# Patient Record
Sex: FEMALE | Race: WHITE | NOT HISPANIC OR LATINO | Employment: OTHER | URBAN - METROPOLITAN AREA
[De-identification: names, ages, dates, MRNs, and addresses within clinical notes are randomized per-mention and may not be internally consistent; named-entity substitution may affect disease eponyms.]

---

## 2017-02-17 ENCOUNTER — APPOINTMENT (EMERGENCY)
Dept: NUCLEAR MEDICINE | Facility: HOSPITAL | Age: 77
DRG: 280 | End: 2017-02-17
Payer: MEDICARE

## 2017-02-17 ENCOUNTER — HOSPITAL ENCOUNTER (INPATIENT)
Facility: HOSPITAL | Age: 77
LOS: 4 days | Discharge: HOME | DRG: 280 | End: 2017-02-21
Attending: EMERGENCY MEDICINE | Admitting: HOSPITALIST
Payer: MEDICARE

## 2017-02-17 ENCOUNTER — APPOINTMENT (EMERGENCY)
Dept: ULTRASOUND IMAGING | Facility: HOSPITAL | Age: 77
DRG: 280 | End: 2017-02-17
Payer: MEDICARE

## 2017-02-17 ENCOUNTER — GENERIC CONVERSION - ENCOUNTER (OUTPATIENT)
Dept: OTHER | Facility: OTHER | Age: 77
End: 2017-02-17

## 2017-02-17 ENCOUNTER — APPOINTMENT (EMERGENCY)
Dept: RADIOLOGY | Facility: HOSPITAL | Age: 77
DRG: 280 | End: 2017-02-17
Payer: MEDICARE

## 2017-02-17 DIAGNOSIS — I82.409 DVT (DEEP VENOUS THROMBOSIS) (HCC): ICD-10-CM

## 2017-02-17 DIAGNOSIS — I50.23 ACUTE ON CHRONIC SYSTOLIC CONGESTIVE HEART FAILURE (HCC): Chronic | ICD-10-CM

## 2017-02-17 DIAGNOSIS — I48.91 ATRIAL FIBRILLATION WITH RAPID VENTRICULAR RESPONSE (HCC): ICD-10-CM

## 2017-02-17 DIAGNOSIS — I26.99 ACUTE PULMONARY EMBOLISM (HCC): Primary | ICD-10-CM

## 2017-02-17 DIAGNOSIS — I82.409 ACUTE DVT (DEEP VENOUS THROMBOSIS) (HCC): ICD-10-CM

## 2017-02-17 DIAGNOSIS — I48.91 RAPID ATRIAL FIBRILLATION (HCC): ICD-10-CM

## 2017-02-17 PROBLEM — I45.10 INCOMPLETE RBBB: Status: ACTIVE | Noted: 2017-02-17

## 2017-02-17 PROBLEM — R53.83 FATIGUE: Status: ACTIVE | Noted: 2017-02-17

## 2017-02-17 PROBLEM — I21.4 NSTEMI (NON-ST ELEVATED MYOCARDIAL INFARCTION) (HCC): Status: ACTIVE | Noted: 2017-02-17

## 2017-02-17 PROBLEM — K21.9 GERD (GASTROESOPHAGEAL REFLUX DISEASE): Chronic | Status: ACTIVE | Noted: 2017-02-17

## 2017-02-17 PROBLEM — K21.9 GERD (GASTROESOPHAGEAL REFLUX DISEASE): Status: ACTIVE | Noted: 2017-02-17

## 2017-02-17 PROBLEM — R73.9 HYPERGLYCEMIA: Status: ACTIVE | Noted: 2017-02-17

## 2017-02-17 PROBLEM — N17.9 AKI (ACUTE KIDNEY INJURY) (HCC): Status: ACTIVE | Noted: 2017-02-17

## 2017-02-17 PROBLEM — R06.89 RESPIRATORY INSUFFICIENCY: Status: ACTIVE | Noted: 2017-02-17

## 2017-02-17 PROBLEM — D72.829 LEUKOCYTOSIS: Status: ACTIVE | Noted: 2017-02-17

## 2017-02-17 LAB
ALBUMIN SERPL BCP-MCNC: 2.7 G/DL (ref 3.5–5)
ALP SERPL-CCNC: 111 U/L (ref 46–116)
ALT SERPL W P-5'-P-CCNC: 41 U/L (ref 12–78)
ANION GAP SERPL CALCULATED.3IONS-SCNC: 10 MMOL/L (ref 4–13)
ANISOCYTOSIS BLD QL SMEAR: PRESENT
APTT PPP: 27 SECONDS (ref 24–36)
AST SERPL W P-5'-P-CCNC: 47 U/L (ref 5–45)
BACTERIA UR QL AUTO: ABNORMAL /HPF
BASOPHILS # BLD MANUAL: 0 THOUSAND/UL (ref 0–0.1)
BASOPHILS NFR MAR MANUAL: 0 % (ref 0–1)
BILIRUB SERPL-MCNC: 1.4 MG/DL (ref 0.2–1)
BILIRUB UR QL STRIP: ABNORMAL
BUN SERPL-MCNC: 20 MG/DL (ref 5–25)
CALCIUM SERPL-MCNC: 9.6 MG/DL (ref 8.3–10.1)
CHLORIDE SERPL-SCNC: 101 MMOL/L (ref 100–108)
CLARITY UR: ABNORMAL
CO2 SERPL-SCNC: 26 MMOL/L (ref 21–32)
COLOR UR: YELLOW
CREAT SERPL-MCNC: 1.47 MG/DL (ref 0.6–1.3)
DEPRECATED D DIMER PPP: ABNORMAL NG/ML (FEU) (ref 0–424)
EOSINOPHIL # BLD MANUAL: 0.38 THOUSAND/UL (ref 0–0.4)
EOSINOPHIL NFR BLD MANUAL: 0 % (ref 0–6)
ERYTHROCYTE [DISTWIDTH] IN BLOOD BY AUTOMATED COUNT: 15.6 % (ref 11.6–15.1)
EST. AVERAGE GLUCOSE BLD GHB EST-MCNC: 174 MG/DL
GFR SERPL CREATININE-BSD FRML MDRD: 34.7 ML/MIN/1.73SQ M
GLUCOSE SERPL-MCNC: 145 MG/DL (ref 65–140)
GLUCOSE SERPL-MCNC: 171 MG/DL (ref 65–140)
GLUCOSE SERPL-MCNC: 227 MG/DL (ref 65–140)
GLUCOSE UR STRIP-MCNC: NEGATIVE MG/DL
HBA1C MFR BLD: 7.7 % (ref 4.2–6.3)
HCT VFR BLD AUTO: 49.7 % (ref 34.8–46.1)
HGB BLD-MCNC: 16.3 G/DL (ref 11.5–15.4)
HGB UR QL STRIP.AUTO: ABNORMAL
HYALINE CASTS #/AREA URNS LPF: ABNORMAL /LPF
INR PPP: 1.18 (ref 0.86–1.16)
KETONES UR STRIP-MCNC: ABNORMAL MG/DL
LEUKOCYTE ESTERASE UR QL STRIP: ABNORMAL
LYMPHOCYTES # BLD AUTO: 1.5 THOUSAND/UL (ref 0.6–4.47)
LYMPHOCYTES # BLD AUTO: 10 % (ref 14–44)
MCH RBC QN AUTO: 30.3 PG (ref 26.8–34.3)
MCHC RBC AUTO-ENTMCNC: 32.8 G/DL (ref 31.4–37.4)
MCV RBC AUTO: 92 FL (ref 82–98)
MONOCYTES # BLD AUTO: 0.5 THOUSAND/UL (ref 0–1.22)
MONOCYTES NFR BLD: 15 % (ref 4–12)
NEUTROPHILS # BLD MANUAL: 10.14 THOUSAND/UL (ref 1.85–7.62)
NEUTS SEG NFR BLD AUTO: 75 % (ref 43–75)
NITRITE UR QL STRIP: NEGATIVE
NON-SQ EPI CELLS URNS QL MICRO: ABNORMAL /HPF
NT-PROBNP SERPL-MCNC: ABNORMAL PG/ML
NT-PROBNP SERPL-MCNC: ABNORMAL PG/ML
OTHER STN SPEC: ABNORMAL
PH UR STRIP.AUTO: 5.5 [PH] (ref 4.5–8)
PLATELET # BLD AUTO: 179 THOUSANDS/UL (ref 149–390)
PLATELET BLD QL SMEAR: ADEQUATE
PMV BLD AUTO: 11.4 FL (ref 8.9–12.7)
POTASSIUM SERPL-SCNC: 4.9 MMOL/L (ref 3.5–5.3)
PROT SERPL-MCNC: 7 G/DL (ref 6.4–8.2)
PROT UR STRIP-MCNC: ABNORMAL MG/DL
PROTHROMBIN TIME: 14.7 SECONDS (ref 12–14.3)
RBC # BLD AUTO: 5.38 MILLION/UL (ref 3.81–5.12)
RBC #/AREA URNS AUTO: ABNORMAL /HPF
SODIUM SERPL-SCNC: 137 MMOL/L (ref 136–145)
SP GR UR STRIP.AUTO: >=1.03 (ref 1–1.03)
TOTAL CELLS COUNTED SPEC: 100
TROPONIN I SERPL-MCNC: 0.5 NG/ML
TROPONIN I SERPL-MCNC: 0.57 NG/ML
TSH SERPL DL<=0.05 MIU/L-ACNC: 0.75 UIU/ML (ref 0.36–3.74)
UROBILINOGEN UR QL STRIP.AUTO: 1 E.U./DL
WBC # BLD AUTO: 12.52 THOUSAND/UL (ref 4.31–10.16)
WBC #/AREA URNS AUTO: ABNORMAL /HPF

## 2017-02-17 PROCEDURE — 93005 ELECTROCARDIOGRAM TRACING: CPT | Performed by: EMERGENCY MEDICINE

## 2017-02-17 PROCEDURE — 85007 BL SMEAR W/DIFF WBC COUNT: CPT | Performed by: EMERGENCY MEDICINE

## 2017-02-17 PROCEDURE — 96365 THER/PROPH/DIAG IV INF INIT: CPT

## 2017-02-17 PROCEDURE — 83880 ASSAY OF NATRIURETIC PEPTIDE: CPT | Performed by: NURSE PRACTITIONER

## 2017-02-17 PROCEDURE — A9558 XE133 XENON 10MCI: HCPCS

## 2017-02-17 PROCEDURE — 36415 COLL VENOUS BLD VENIPUNCTURE: CPT | Performed by: EMERGENCY MEDICINE

## 2017-02-17 PROCEDURE — 94664 DEMO&/EVAL PT USE INHALER: CPT

## 2017-02-17 PROCEDURE — A9540 TC99M MAA: HCPCS

## 2017-02-17 PROCEDURE — 71020 HB CHEST X-RAY 2VW FRONTAL&LATL: CPT

## 2017-02-17 PROCEDURE — 85379 FIBRIN DEGRADATION QUANT: CPT | Performed by: EMERGENCY MEDICINE

## 2017-02-17 PROCEDURE — 93005 ELECTROCARDIOGRAM TRACING: CPT

## 2017-02-17 PROCEDURE — 84443 ASSAY THYROID STIM HORMONE: CPT | Performed by: NURSE PRACTITIONER

## 2017-02-17 PROCEDURE — 83880 ASSAY OF NATRIURETIC PEPTIDE: CPT | Performed by: EMERGENCY MEDICINE

## 2017-02-17 PROCEDURE — 82948 REAGENT STRIP/BLOOD GLUCOSE: CPT

## 2017-02-17 PROCEDURE — 85730 THROMBOPLASTIN TIME PARTIAL: CPT | Performed by: EMERGENCY MEDICINE

## 2017-02-17 PROCEDURE — 84484 ASSAY OF TROPONIN QUANT: CPT | Performed by: EMERGENCY MEDICINE

## 2017-02-17 PROCEDURE — 87086 URINE CULTURE/COLONY COUNT: CPT | Performed by: EMERGENCY MEDICINE

## 2017-02-17 PROCEDURE — 85610 PROTHROMBIN TIME: CPT | Performed by: EMERGENCY MEDICINE

## 2017-02-17 PROCEDURE — 78582 LUNG VENTILAT&PERFUS IMAGING: CPT

## 2017-02-17 PROCEDURE — 85027 COMPLETE CBC AUTOMATED: CPT | Performed by: EMERGENCY MEDICINE

## 2017-02-17 PROCEDURE — 93970 EXTREMITY STUDY: CPT

## 2017-02-17 PROCEDURE — 84484 ASSAY OF TROPONIN QUANT: CPT | Performed by: NURSE PRACTITIONER

## 2017-02-17 PROCEDURE — 83036 HEMOGLOBIN GLYCOSYLATED A1C: CPT | Performed by: NURSE PRACTITIONER

## 2017-02-17 PROCEDURE — 80053 COMPREHEN METABOLIC PANEL: CPT | Performed by: EMERGENCY MEDICINE

## 2017-02-17 PROCEDURE — 81001 URINALYSIS AUTO W/SCOPE: CPT | Performed by: EMERGENCY MEDICINE

## 2017-02-17 PROCEDURE — 99285 EMERGENCY DEPT VISIT HI MDM: CPT

## 2017-02-17 PROCEDURE — 96361 HYDRATE IV INFUSION ADD-ON: CPT

## 2017-02-17 PROCEDURE — 96366 THER/PROPH/DIAG IV INF ADDON: CPT

## 2017-02-17 PROCEDURE — 87147 CULTURE TYPE IMMUNOLOGIC: CPT | Performed by: EMERGENCY MEDICINE

## 2017-02-17 RX ORDER — LIDOCAINE HYDROCHLORIDE 10 MG/ML
INJECTION, SOLUTION EPIDURAL; INFILTRATION; INTRACAUDAL; PERINEURAL
Status: DISPENSED
Start: 2017-02-17 | End: 2017-02-18

## 2017-02-17 RX ORDER — DILTIAZEM HYDROCHLORIDE 5 MG/ML
15 INJECTION INTRAVENOUS ONCE
Status: COMPLETED | OUTPATIENT
Start: 2017-02-17 | End: 2017-02-17

## 2017-02-17 RX ORDER — PANTOPRAZOLE SODIUM 40 MG/1
40 TABLET, DELAYED RELEASE ORAL
Status: DISCONTINUED | OUTPATIENT
Start: 2017-02-18 | End: 2017-02-21 | Stop reason: HOSPADM

## 2017-02-17 RX ORDER — ALBUTEROL SULFATE 90 UG/1
2 AEROSOL, METERED RESPIRATORY (INHALATION) EVERY 4 HOURS PRN
Status: DISCONTINUED | OUTPATIENT
Start: 2017-02-17 | End: 2017-02-21 | Stop reason: HOSPADM

## 2017-02-17 RX ORDER — HEPARIN SODIUM 1000 [USP'U]/ML
8800 INJECTION, SOLUTION INTRAVENOUS; SUBCUTANEOUS ONCE
Status: COMPLETED | OUTPATIENT
Start: 2017-02-17 | End: 2017-02-17

## 2017-02-17 RX ORDER — HEPARIN SODIUM 10000 [USP'U]/100ML
3-30 INJECTION, SOLUTION INTRAVENOUS
Status: DISCONTINUED | OUTPATIENT
Start: 2017-02-17 | End: 2017-02-19

## 2017-02-17 RX ADMIN — DILTIAZEM HYDROCHLORIDE 5 MG/HR: 5 INJECTION INTRAVENOUS at 12:04

## 2017-02-17 RX ADMIN — HEPARIN SODIUM 8800 UNITS: 1000 INJECTION, SOLUTION INTRAVENOUS; SUBCUTANEOUS at 15:11

## 2017-02-17 RX ADMIN — DILTIAZEM HYDROCHLORIDE 15 MG: 5 INJECTION INTRAVENOUS at 12:05

## 2017-02-17 RX ADMIN — HEPARIN SODIUM AND DEXTROSE 18 UNITS/KG/HR: 10000; 5 INJECTION INTRAVENOUS at 15:12

## 2017-02-17 RX ADMIN — SODIUM CHLORIDE 1000 ML: 0.9 INJECTION, SOLUTION INTRAVENOUS at 10:40

## 2017-02-18 ENCOUNTER — GENERIC CONVERSION - ENCOUNTER (OUTPATIENT)
Dept: OTHER | Facility: OTHER | Age: 77
End: 2017-02-18

## 2017-02-18 LAB
ALBUMIN SERPL BCP-MCNC: 2.3 G/DL (ref 3.5–5)
ALP SERPL-CCNC: 95 U/L (ref 46–116)
ALT SERPL W P-5'-P-CCNC: 33 U/L (ref 12–78)
ANION GAP SERPL CALCULATED.3IONS-SCNC: 8 MMOL/L (ref 4–13)
APTT PPP: 159 SECONDS (ref 24–36)
APTT PPP: 199 SECONDS (ref 24–36)
APTT PPP: 78 SECONDS (ref 24–36)
APTT PPP: 80 SECONDS (ref 24–36)
AST SERPL W P-5'-P-CCNC: 31 U/L (ref 5–45)
BILIRUB SERPL-MCNC: 1.2 MG/DL (ref 0.2–1)
BUN SERPL-MCNC: 19 MG/DL (ref 5–25)
CALCIUM SERPL-MCNC: 8.9 MG/DL (ref 8.3–10.1)
CHLORIDE SERPL-SCNC: 104 MMOL/L (ref 100–108)
CO2 SERPL-SCNC: 24 MMOL/L (ref 21–32)
CREAT SERPL-MCNC: 0.94 MG/DL (ref 0.6–1.3)
ERYTHROCYTE [DISTWIDTH] IN BLOOD BY AUTOMATED COUNT: 15.5 % (ref 11.6–15.1)
GFR SERPL CREATININE-BSD FRML MDRD: 58.2 ML/MIN/1.73SQ M
GLUCOSE SERPL-MCNC: 151 MG/DL (ref 65–140)
GLUCOSE SERPL-MCNC: 158 MG/DL (ref 65–140)
GLUCOSE SERPL-MCNC: 185 MG/DL (ref 65–140)
GLUCOSE SERPL-MCNC: 190 MG/DL (ref 65–140)
GLUCOSE SERPL-MCNC: 196 MG/DL (ref 65–140)
HCT VFR BLD AUTO: 44.5 % (ref 34.8–46.1)
HGB BLD-MCNC: 14.6 G/DL (ref 11.5–15.4)
MCH RBC QN AUTO: 29.8 PG (ref 26.8–34.3)
MCHC RBC AUTO-ENTMCNC: 32.8 G/DL (ref 31.4–37.4)
MCV RBC AUTO: 91 FL (ref 82–98)
PLATELET # BLD AUTO: 179 THOUSANDS/UL (ref 149–390)
PMV BLD AUTO: 11.2 FL (ref 8.9–12.7)
POTASSIUM SERPL-SCNC: 4.2 MMOL/L (ref 3.5–5.3)
PROT SERPL-MCNC: 5.9 G/DL (ref 6.4–8.2)
RBC # BLD AUTO: 4.9 MILLION/UL (ref 3.81–5.12)
SODIUM SERPL-SCNC: 136 MMOL/L (ref 136–145)
TROPONIN I SERPL-MCNC: 0.75 NG/ML
TROPONIN I SERPL-MCNC: 0.86 NG/ML
WBC # BLD AUTO: 13.06 THOUSAND/UL (ref 4.31–10.16)

## 2017-02-18 PROCEDURE — 80053 COMPREHEN METABOLIC PANEL: CPT | Performed by: NURSE PRACTITIONER

## 2017-02-18 PROCEDURE — 93005 ELECTROCARDIOGRAM TRACING: CPT | Performed by: NURSE PRACTITIONER

## 2017-02-18 PROCEDURE — 85730 THROMBOPLASTIN TIME PARTIAL: CPT | Performed by: HOSPITALIST

## 2017-02-18 PROCEDURE — 84484 ASSAY OF TROPONIN QUANT: CPT | Performed by: NURSE PRACTITIONER

## 2017-02-18 PROCEDURE — 03HB33Z INSERTION OF INFUSION DEVICE INTO RIGHT RADIAL ARTERY, PERCUTANEOUS APPROACH: ICD-10-PCS | Performed by: HOSPITALIST

## 2017-02-18 PROCEDURE — 82948 REAGENT STRIP/BLOOD GLUCOSE: CPT

## 2017-02-18 PROCEDURE — 84484 ASSAY OF TROPONIN QUANT: CPT | Performed by: PHYSICIAN ASSISTANT

## 2017-02-18 PROCEDURE — 85027 COMPLETE CBC AUTOMATED: CPT | Performed by: NURSE PRACTITIONER

## 2017-02-18 RX ORDER — NEBIVOLOL 5 MG/1
2.5 TABLET ORAL DAILY
Status: DISCONTINUED | OUTPATIENT
Start: 2017-02-18 | End: 2017-02-20

## 2017-02-18 RX ORDER — FUROSEMIDE 10 MG/ML
20 INJECTION INTRAMUSCULAR; INTRAVENOUS DAILY
Status: DISCONTINUED | OUTPATIENT
Start: 2017-02-18 | End: 2017-02-20

## 2017-02-18 RX ADMIN — INSULIN LISPRO 2 UNITS: 100 INJECTION, SOLUTION INTRAVENOUS; SUBCUTANEOUS at 08:44

## 2017-02-18 RX ADMIN — FUROSEMIDE 20 MG: 10 INJECTION, SOLUTION INTRAMUSCULAR; INTRAVENOUS at 10:05

## 2017-02-18 RX ADMIN — METOPROLOL TARTRATE 12.5 MG: 25 TABLET ORAL at 10:05

## 2017-02-18 RX ADMIN — HEPARIN SODIUM AND DEXTROSE 15 UNITS/KG/HR: 10000; 5 INJECTION INTRAVENOUS at 01:00

## 2017-02-18 RX ADMIN — INSULIN LISPRO 2 UNITS: 100 INJECTION, SOLUTION INTRAVENOUS; SUBCUTANEOUS at 16:53

## 2017-02-18 RX ADMIN — NEBIVOLOL HYDROCHLORIDE 2.5 MG: 5 TABLET ORAL at 11:15

## 2017-02-18 RX ADMIN — PANTOPRAZOLE SODIUM 40 MG: 40 TABLET, DELAYED RELEASE ORAL at 06:43

## 2017-02-18 RX ADMIN — INSULIN LISPRO 2 UNITS: 100 INJECTION, SOLUTION INTRAVENOUS; SUBCUTANEOUS at 21:21

## 2017-02-18 RX ADMIN — INSULIN LISPRO 2 UNITS: 100 INJECTION, SOLUTION INTRAVENOUS; SUBCUTANEOUS at 11:22

## 2017-02-19 ENCOUNTER — GENERIC CONVERSION - ENCOUNTER (OUTPATIENT)
Dept: OTHER | Facility: OTHER | Age: 77
End: 2017-02-19

## 2017-02-19 ENCOUNTER — APPOINTMENT (INPATIENT)
Dept: NON INVASIVE DIAGNOSTICS | Facility: HOSPITAL | Age: 77
DRG: 280 | End: 2017-02-19
Payer: MEDICARE

## 2017-02-19 LAB
ANION GAP SERPL CALCULATED.3IONS-SCNC: 9 MMOL/L (ref 4–13)
APTT PPP: 85 SECONDS (ref 24–36)
BACTERIA UR CULT: NORMAL
BACTERIA UR CULT: NORMAL
BASOPHILS # BLD AUTO: 0.06 THOUSANDS/ΜL (ref 0–0.1)
BASOPHILS NFR BLD AUTO: 1 % (ref 0–1)
BUN SERPL-MCNC: 19 MG/DL (ref 5–25)
CALCIUM SERPL-MCNC: 9.2 MG/DL (ref 8.3–10.1)
CHLORIDE SERPL-SCNC: 103 MMOL/L (ref 100–108)
CO2 SERPL-SCNC: 25 MMOL/L (ref 21–32)
CREAT SERPL-MCNC: 0.94 MG/DL (ref 0.6–1.3)
EOSINOPHIL # BLD AUTO: 0.25 THOUSAND/ΜL (ref 0–0.61)
EOSINOPHIL NFR BLD AUTO: 2 % (ref 0–6)
ERYTHROCYTE [DISTWIDTH] IN BLOOD BY AUTOMATED COUNT: 15.5 % (ref 11.6–15.1)
GFR SERPL CREATININE-BSD FRML MDRD: 58.2 ML/MIN/1.73SQ M
GLUCOSE SERPL-MCNC: 128 MG/DL (ref 65–140)
GLUCOSE SERPL-MCNC: 137 MG/DL (ref 65–140)
GLUCOSE SERPL-MCNC: 139 MG/DL (ref 65–140)
GLUCOSE SERPL-MCNC: 143 MG/DL (ref 65–140)
HCT VFR BLD AUTO: 45.3 % (ref 34.8–46.1)
HGB BLD-MCNC: 15 G/DL (ref 11.5–15.4)
LYMPHOCYTES # BLD AUTO: 3.36 THOUSANDS/ΜL (ref 0.6–4.47)
LYMPHOCYTES NFR BLD AUTO: 26 % (ref 14–44)
MAGNESIUM SERPL-MCNC: 1.5 MG/DL (ref 1.6–2.6)
MCH RBC QN AUTO: 30.2 PG (ref 26.8–34.3)
MCHC RBC AUTO-ENTMCNC: 33.1 G/DL (ref 31.4–37.4)
MCV RBC AUTO: 91 FL (ref 82–98)
MONOCYTES # BLD AUTO: 1.53 THOUSAND/ΜL (ref 0.17–1.22)
MONOCYTES NFR BLD AUTO: 12 % (ref 4–12)
NEUTROPHILS # BLD AUTO: 7.75 THOUSANDS/ΜL (ref 1.85–7.62)
NEUTS SEG NFR BLD AUTO: 59 % (ref 43–75)
PHOSPHATE SERPL-MCNC: 2.6 MG/DL (ref 2.3–4.1)
PLATELET # BLD AUTO: 198 THOUSANDS/UL (ref 149–390)
PMV BLD AUTO: 11 FL (ref 8.9–12.7)
POTASSIUM SERPL-SCNC: 4.1 MMOL/L (ref 3.5–5.3)
RBC # BLD AUTO: 4.97 MILLION/UL (ref 3.81–5.12)
SODIUM SERPL-SCNC: 137 MMOL/L (ref 136–145)
WBC # BLD AUTO: 12.95 THOUSAND/UL (ref 4.31–10.16)

## 2017-02-19 PROCEDURE — 82948 REAGENT STRIP/BLOOD GLUCOSE: CPT

## 2017-02-19 PROCEDURE — 85730 THROMBOPLASTIN TIME PARTIAL: CPT | Performed by: HOSPITALIST

## 2017-02-19 PROCEDURE — 85025 COMPLETE CBC W/AUTO DIFF WBC: CPT | Performed by: PHYSICIAN ASSISTANT

## 2017-02-19 PROCEDURE — 80048 BASIC METABOLIC PNL TOTAL CA: CPT | Performed by: PHYSICIAN ASSISTANT

## 2017-02-19 PROCEDURE — 93308 TTE F-UP OR LMTD: CPT

## 2017-02-19 PROCEDURE — 84100 ASSAY OF PHOSPHORUS: CPT | Performed by: PHYSICIAN ASSISTANT

## 2017-02-19 PROCEDURE — 83735 ASSAY OF MAGNESIUM: CPT | Performed by: PHYSICIAN ASSISTANT

## 2017-02-19 RX ORDER — MAGNESIUM SULFATE HEPTAHYDRATE 40 MG/ML
2 INJECTION, SOLUTION INTRAVENOUS ONCE
Status: COMPLETED | OUTPATIENT
Start: 2017-02-19 | End: 2017-02-20

## 2017-02-19 RX ADMIN — PANTOPRAZOLE SODIUM 40 MG: 40 TABLET, DELAYED RELEASE ORAL at 05:53

## 2017-02-19 RX ADMIN — FUROSEMIDE 20 MG: 10 INJECTION, SOLUTION INTRAMUSCULAR; INTRAVENOUS at 08:14

## 2017-02-19 RX ADMIN — APIXABAN 2.5 MG: 2.5 TABLET, FILM COATED ORAL at 18:29

## 2017-02-19 RX ADMIN — APIXABAN 2.5 MG: 2.5 TABLET, FILM COATED ORAL at 08:14

## 2017-02-19 RX ADMIN — HEPARIN SODIUM AND DEXTROSE 12 UNITS/KG/HR: 10000; 5 INJECTION INTRAVENOUS at 04:18

## 2017-02-19 RX ADMIN — MAGNESIUM SULFATE HEPTAHYDRATE 2 G: 40 INJECTION, SOLUTION INTRAVENOUS at 06:05

## 2017-02-19 RX ADMIN — NEBIVOLOL HYDROCHLORIDE 2.5 MG: 5 TABLET ORAL at 08:14

## 2017-02-20 LAB
ATRIAL RATE: 174 BPM
GLUCOSE SERPL-MCNC: 124 MG/DL (ref 65–140)
GLUCOSE SERPL-MCNC: 159 MG/DL (ref 65–140)
GLUCOSE SERPL-MCNC: 165 MG/DL (ref 65–140)
GLUCOSE SERPL-MCNC: 171 MG/DL (ref 65–140)
P AXIS: 0 DEGREES
QRS AXIS: 38 DEGREES
QRSD INTERVAL: 102 MS
QT INTERVAL: 302 MS
QTC INTERVAL: 438 MS
T WAVE AXIS: 27 DEGREES
VENTRICULAR RATE: 127 BPM

## 2017-02-20 PROCEDURE — 97162 PT EVAL MOD COMPLEX 30 MIN: CPT

## 2017-02-20 PROCEDURE — G8980 MOBILITY D/C STATUS: HCPCS

## 2017-02-20 PROCEDURE — 82948 REAGENT STRIP/BLOOD GLUCOSE: CPT

## 2017-02-20 PROCEDURE — G8979 MOBILITY GOAL STATUS: HCPCS

## 2017-02-20 PROCEDURE — G8978 MOBILITY CURRENT STATUS: HCPCS

## 2017-02-20 RX ORDER — ACETAMINOPHEN 325 MG/1
650 TABLET ORAL EVERY 6 HOURS PRN
Status: DISCONTINUED | OUTPATIENT
Start: 2017-02-20 | End: 2017-02-21 | Stop reason: HOSPADM

## 2017-02-20 RX ORDER — ACETAMINOPHEN 325 MG/1
325 TABLET ORAL ONCE
Status: COMPLETED | OUTPATIENT
Start: 2017-02-20 | End: 2017-02-20

## 2017-02-20 RX ORDER — FUROSEMIDE 20 MG/1
20 TABLET ORAL DAILY
Status: DISCONTINUED | OUTPATIENT
Start: 2017-02-21 | End: 2017-02-21 | Stop reason: HOSPADM

## 2017-02-20 RX ORDER — METOPROLOL SUCCINATE 25 MG/1
25 TABLET, EXTENDED RELEASE ORAL DAILY
Status: DISCONTINUED | OUTPATIENT
Start: 2017-02-21 | End: 2017-02-21 | Stop reason: HOSPADM

## 2017-02-20 RX ADMIN — INSULIN LISPRO 1 UNITS: 100 INJECTION, SOLUTION INTRAVENOUS; SUBCUTANEOUS at 22:08

## 2017-02-20 RX ADMIN — ACETAMINOPHEN 325 MG: 325 TABLET ORAL at 01:45

## 2017-02-20 RX ADMIN — PANTOPRAZOLE SODIUM 40 MG: 40 TABLET, DELAYED RELEASE ORAL at 06:44

## 2017-02-20 RX ADMIN — APIXABAN 2.5 MG: 2.5 TABLET, FILM COATED ORAL at 09:20

## 2017-02-20 RX ADMIN — INSULIN LISPRO 2 UNITS: 100 INJECTION, SOLUTION INTRAVENOUS; SUBCUTANEOUS at 12:57

## 2017-02-20 RX ADMIN — ACETAMINOPHEN 325 MG: 325 TABLET ORAL at 05:00

## 2017-02-20 RX ADMIN — NEBIVOLOL HYDROCHLORIDE 2.5 MG: 5 TABLET ORAL at 09:20

## 2017-02-20 RX ADMIN — FUROSEMIDE 20 MG: 10 INJECTION, SOLUTION INTRAMUSCULAR; INTRAVENOUS at 09:20

## 2017-02-20 RX ADMIN — INSULIN LISPRO 2 UNITS: 100 INJECTION, SOLUTION INTRAVENOUS; SUBCUTANEOUS at 18:11

## 2017-02-20 RX ADMIN — APIXABAN 2.5 MG: 2.5 TABLET, FILM COATED ORAL at 17:11

## 2017-02-21 VITALS
TEMPERATURE: 97.8 F | BODY MASS INDEX: 39.03 KG/M2 | SYSTOLIC BLOOD PRESSURE: 143 MMHG | DIASTOLIC BLOOD PRESSURE: 81 MMHG | HEART RATE: 75 BPM | WEIGHT: 248.68 LBS | OXYGEN SATURATION: 92 % | RESPIRATION RATE: 18 BRPM | HEIGHT: 67 IN

## 2017-02-21 PROBLEM — R06.89 RESPIRATORY INSUFFICIENCY: Status: RESOLVED | Noted: 2017-02-17 | Resolved: 2017-02-21

## 2017-02-21 PROBLEM — I21.4 NSTEMI (NON-ST ELEVATED MYOCARDIAL INFARCTION) (HCC): Status: RESOLVED | Noted: 2017-02-17 | Resolved: 2017-02-21

## 2017-02-21 PROBLEM — R53.83 FATIGUE: Status: RESOLVED | Noted: 2017-02-17 | Resolved: 2017-02-21

## 2017-02-21 PROBLEM — I48.91 RAPID ATRIAL FIBRILLATION (HCC): Status: RESOLVED | Noted: 2017-02-17 | Resolved: 2017-02-21

## 2017-02-21 PROBLEM — N17.9 AKI (ACUTE KIDNEY INJURY) (HCC): Status: RESOLVED | Noted: 2017-02-17 | Resolved: 2017-02-21

## 2017-02-21 PROBLEM — E11.9 TYPE 2 DIABETES MELLITUS (HCC): Status: ACTIVE | Noted: 2017-02-17

## 2017-02-21 PROBLEM — D72.829 LEUKOCYTOSIS: Status: RESOLVED | Noted: 2017-02-17 | Resolved: 2017-02-21

## 2017-02-21 LAB
ANION GAP SERPL CALCULATED.3IONS-SCNC: 8 MMOL/L (ref 4–13)
ATRIAL RATE: 108 BPM
BASOPHILS # BLD AUTO: 0.02 THOUSANDS/ΜL (ref 0–0.1)
BASOPHILS NFR BLD AUTO: 0 % (ref 0–1)
BUN SERPL-MCNC: 18 MG/DL (ref 5–25)
CALCIUM SERPL-MCNC: 9.4 MG/DL (ref 8.3–10.1)
CHLORIDE SERPL-SCNC: 102 MMOL/L (ref 100–108)
CO2 SERPL-SCNC: 27 MMOL/L (ref 21–32)
CREAT SERPL-MCNC: 0.91 MG/DL (ref 0.6–1.3)
EOSINOPHIL # BLD AUTO: 0.22 THOUSAND/ΜL (ref 0–0.61)
EOSINOPHIL NFR BLD AUTO: 2 % (ref 0–6)
ERYTHROCYTE [DISTWIDTH] IN BLOOD BY AUTOMATED COUNT: 15.3 % (ref 11.6–15.1)
GFR SERPL CREATININE-BSD FRML MDRD: >60 ML/MIN/1.73SQ M
GLUCOSE SERPL-MCNC: 127 MG/DL (ref 65–140)
GLUCOSE SERPL-MCNC: 132 MG/DL (ref 65–140)
GLUCOSE SERPL-MCNC: 166 MG/DL (ref 65–140)
HCT VFR BLD AUTO: 44.3 % (ref 34.8–46.1)
HGB BLD-MCNC: 14.4 G/DL (ref 11.5–15.4)
LYMPHOCYTES # BLD AUTO: 2.44 THOUSANDS/ΜL (ref 0.6–4.47)
LYMPHOCYTES NFR BLD AUTO: 27 % (ref 14–44)
MCH RBC QN AUTO: 29.8 PG (ref 26.8–34.3)
MCHC RBC AUTO-ENTMCNC: 32.5 G/DL (ref 31.4–37.4)
MCV RBC AUTO: 92 FL (ref 82–98)
MONOCYTES # BLD AUTO: 1 THOUSAND/ΜL (ref 0.17–1.22)
MONOCYTES NFR BLD AUTO: 11 % (ref 4–12)
NEUTROPHILS # BLD AUTO: 5.42 THOUSANDS/ΜL (ref 1.85–7.62)
NEUTS SEG NFR BLD AUTO: 60 % (ref 43–75)
PLATELET # BLD AUTO: 247 THOUSANDS/UL (ref 149–390)
PMV BLD AUTO: 11.5 FL (ref 8.9–12.7)
POTASSIUM SERPL-SCNC: 3.9 MMOL/L (ref 3.5–5.3)
QRS AXIS: -20 DEGREES
QRSD INTERVAL: 98 MS
QT INTERVAL: 370 MS
QTC INTERVAL: 447 MS
RBC # BLD AUTO: 4.84 MILLION/UL (ref 3.81–5.12)
SODIUM SERPL-SCNC: 137 MMOL/L (ref 136–145)
T WAVE AXIS: 10 DEGREES
VENTRICULAR RATE: 88 BPM
WBC # BLD AUTO: 9.1 THOUSAND/UL (ref 4.31–10.16)

## 2017-02-21 PROCEDURE — 80048 BASIC METABOLIC PNL TOTAL CA: CPT | Performed by: HOSPITALIST

## 2017-02-21 PROCEDURE — 85025 COMPLETE CBC W/AUTO DIFF WBC: CPT | Performed by: HOSPITALIST

## 2017-02-21 PROCEDURE — 82948 REAGENT STRIP/BLOOD GLUCOSE: CPT

## 2017-02-21 RX ORDER — METOPROLOL SUCCINATE 25 MG/1
25 TABLET, EXTENDED RELEASE ORAL DAILY
Qty: 30 TABLET | Refills: 0 | Status: SHIPPED | OUTPATIENT
Start: 2017-02-21 | End: 2017-02-21

## 2017-02-21 RX ORDER — METOPROLOL SUCCINATE 25 MG/1
25 TABLET, EXTENDED RELEASE ORAL DAILY
Qty: 30 TABLET | Refills: 0 | Status: SHIPPED | OUTPATIENT
Start: 2017-02-21 | End: 2018-03-28 | Stop reason: SDUPTHER

## 2017-02-21 RX ORDER — FUROSEMIDE 20 MG/1
20 TABLET ORAL DAILY
Qty: 30 TABLET | Refills: 0 | Status: SHIPPED | OUTPATIENT
Start: 2017-02-21 | End: 2019-02-13

## 2017-02-21 RX ORDER — FUROSEMIDE 20 MG/1
20 TABLET ORAL DAILY
Qty: 30 TABLET | Refills: 0 | Status: SHIPPED | OUTPATIENT
Start: 2017-02-21 | End: 2017-02-21

## 2017-02-21 RX ADMIN — PANTOPRAZOLE SODIUM 40 MG: 40 TABLET, DELAYED RELEASE ORAL at 06:14

## 2017-02-21 RX ADMIN — SACUBITRIL AND VALSARTAN 1 TABLET: 24; 26 TABLET, FILM COATED ORAL at 08:58

## 2017-02-21 RX ADMIN — METOPROLOL SUCCINATE 25 MG: 25 TABLET, FILM COATED, EXTENDED RELEASE ORAL at 08:58

## 2017-02-21 RX ADMIN — INSULIN LISPRO 2 UNITS: 100 INJECTION, SOLUTION INTRAVENOUS; SUBCUTANEOUS at 11:53

## 2017-02-21 RX ADMIN — FUROSEMIDE 20 MG: 20 TABLET ORAL at 08:58

## 2017-02-21 RX ADMIN — APIXABAN 2.5 MG: 2.5 TABLET, FILM COATED ORAL at 08:58

## 2017-03-13 ENCOUNTER — ALLSCRIPTS OFFICE VISIT (OUTPATIENT)
Dept: OTHER | Facility: OTHER | Age: 77
End: 2017-03-13

## 2017-03-30 ENCOUNTER — GENERIC CONVERSION - ENCOUNTER (OUTPATIENT)
Dept: OTHER | Facility: OTHER | Age: 77
End: 2017-03-30

## 2018-01-09 NOTE — PROCEDURES
Procedures by Yareli Darling PA-C at 2/18/2017 12:01 AM      Author:  Yareli Darling PA-C Service:  Internal Medicine Author Type:  Physician Assistant    Filed:  2/18/2017 12:03 AM Date of Service:  2/18/2017 12:01 AM Status:  Signed    :  Yareli Darling PA-C (Physician Assistant)  Cosigner:  Jessenia Grace MD at 2/18/2017 10:27 AM      Procedure Orders:       1  Insert arterial line [[id number]] ordered by Yareli Darling PA-C at 02/18/17 0001                 Post-procedure Diagnoses:       1  Acute pulmonary embolism [I26 99]       2  DVT (deep venous thrombosis) [I82 409]       3  Rapid atrial fibrillation [I48 91]                   Arterial Line Insertion  Performed by: Cameron Moran by: Abisai Naqvi     Procedure date/time:  2/18/2017 12:01 AM  Patient location:  Bedside  Other Assisting Provider:  No    Consent:     Consent obtained:  Verbal    Consent given by:  Patient    Risks discussed:  Bleeding, ischemia, repeat procedure, pain and infection  Universal protocol:     Procedure explained and questions answered to patient or proxy's satisfaction: yes      Relevant documents present and verified: yes      Test results available and properly labeled: yes       Imaging studies available: yes      Required blood products, implants, devices, and special equipment available: yes      Site/side marked: yes      Immediately prior to procedure a time out was called: yes      Patient identity confirmed:  Verbally with patient, hospital-assigned identification number and arm band  Indications:     Indications: frequent labs / infusion    Pre-procedure details:     Skin preparation:  Chlorhexidine    Preparation: Patient was prepped and draped in sterile fashion    Anesthesia (see MAR for exact dosages):      Anesthesia method:  Local infiltration    Local anesthetic:  Lidocaine 1% w/o epi  Procedure details:     Location / Tip of Catheter:  Radial    Laterality:  Right Marty's test performed: yes      Marty's test abnormal: no      Needle gauge:  20 G    Placement technique:  Percutaneous    Number of attempts:  1    Successful placement: yes      Transducer: waveform confirmed    Post-procedure details:     Post-procedure:  Biopatch applied, secured with tape, sterile dressing applied, sutured and wrist guard applied    CMS:  Normal    Patient tolerance of procedure:   Tolerated well, no immediate complications                     Received for:Provider  EPIC   Feb 18 2017 10:28AM [facility] Standard Time

## 2018-01-12 VITALS
HEART RATE: 84 BPM | BODY MASS INDEX: 36.89 KG/M2 | DIASTOLIC BLOOD PRESSURE: 80 MMHG | SYSTOLIC BLOOD PRESSURE: 118 MMHG | HEIGHT: 68 IN | WEIGHT: 243.44 LBS

## 2018-03-07 NOTE — PROGRESS NOTES
Dear Parul Gregorio,   My name is Stefany and I am a Registered  Nurse and Care Coordinator for [facility]  We recently spoke on the phone regarding your hospital stay and you opted out of continuing to receive follow-up phone calls from me  Because of the reason that you were  in the hospital, Medicare has placed you in a program called [address]  One of the benefits of the program is that you can have a nurse available to answer any questions or concerns you might have  Please feel free to call  me at the number listed below    Sincerely,      [address]  [phone]          Electronically signed by:Cynthia Green RN  Dec 13 2016  3:01PM EST

## 2018-03-28 DIAGNOSIS — R00.2 PALPITATIONS: Primary | ICD-10-CM

## 2018-03-28 RX ORDER — METOPROLOL SUCCINATE 25 MG/1
TABLET, EXTENDED RELEASE ORAL
Qty: 30 TABLET | Refills: 11 | Status: SHIPPED | OUTPATIENT
Start: 2018-03-28 | End: 2019-02-13

## 2019-02-13 ENCOUNTER — OFFICE VISIT (OUTPATIENT)
Dept: FAMILY MEDICINE CLINIC | Facility: OTHER | Age: 79
End: 2019-02-13
Payer: MEDICARE

## 2019-02-13 VITALS
HEIGHT: 66 IN | TEMPERATURE: 97.5 F | BODY MASS INDEX: 38.61 KG/M2 | OXYGEN SATURATION: 95 % | DIASTOLIC BLOOD PRESSURE: 80 MMHG | WEIGHT: 240.25 LBS | SYSTOLIC BLOOD PRESSURE: 132 MMHG | HEART RATE: 102 BPM

## 2019-02-13 DIAGNOSIS — I50.22 CHRONIC SYSTOLIC CONGESTIVE HEART FAILURE (HCC): ICD-10-CM

## 2019-02-13 DIAGNOSIS — Z23 ENCOUNTER FOR IMMUNIZATION: ICD-10-CM

## 2019-02-13 DIAGNOSIS — M89.9 DISORDER OF BONE: ICD-10-CM

## 2019-02-13 DIAGNOSIS — M17.12 PRIMARY OSTEOARTHRITIS OF LEFT KNEE: ICD-10-CM

## 2019-02-13 DIAGNOSIS — E11.8 TYPE 2 DIABETES MELLITUS WITH COMPLICATION, WITHOUT LONG-TERM CURRENT USE OF INSULIN (HCC): Primary | ICD-10-CM

## 2019-02-13 DIAGNOSIS — M51.37 DDD (DEGENERATIVE DISC DISEASE), LUMBOSACRAL: ICD-10-CM

## 2019-02-13 DIAGNOSIS — R00.2 PALPITATIONS: ICD-10-CM

## 2019-02-13 DIAGNOSIS — I48.0 PAROXYSMAL ATRIAL FIBRILLATION (HCC): ICD-10-CM

## 2019-02-13 DIAGNOSIS — I10 ESSENTIAL HYPERTENSION: ICD-10-CM

## 2019-02-13 PROBLEM — I26.99 PE (PULMONARY THROMBOEMBOLISM) (HCC): Status: ACTIVE | Noted: 2017-12-01

## 2019-02-13 PROBLEM — I82.409 DEEP VEIN THROMBOSIS (DVT) OF LOWER EXTREMITY (HCC): Status: ACTIVE | Noted: 2017-12-01

## 2019-02-13 LAB — SL AMB POCT HEMOGLOBIN AIC: 6.9 (ref ?–6.5)

## 2019-02-13 PROCEDURE — 99204 OFFICE O/P NEW MOD 45 MIN: CPT | Performed by: FAMILY MEDICINE

## 2019-02-13 PROCEDURE — 83036 HEMOGLOBIN GLYCOSYLATED A1C: CPT | Performed by: FAMILY MEDICINE

## 2019-02-13 RX ORDER — METOPROLOL SUCCINATE 25 MG/1
25 TABLET, EXTENDED RELEASE ORAL DAILY
Qty: 30 TABLET | Refills: 3 | Status: SHIPPED | OUTPATIENT
Start: 2019-02-13 | End: 2019-03-29

## 2019-02-13 RX ORDER — OMEPRAZOLE 20 MG/1
1 CAPSULE, DELAYED RELEASE ORAL DAILY
COMMUNITY
Start: 2007-11-19 | End: 2019-02-13

## 2019-02-13 RX ORDER — LISINOPRIL 10 MG/1
10 TABLET ORAL DAILY
Refills: 5 | COMMUNITY
Start: 2018-11-17

## 2019-02-13 NOTE — PROGRESS NOTES
Assessment/Plan:           Problem List Items Addressed This Visit        Endocrine    Type 2 diabetes mellitus ([facility] [address] ) - Primary    Relevant Orders    POCT hemoglobin A1c (Completed)    CBC and differential (Completed)    Comprehensive metabolic panel (Completed)    TSH, 3rd generation with Free T4 reflex (Completed)    Lipid panel (Completed)    HEMOGLOBIN A1C W/ EAG ESTIMATION (Completed)    Vitamin D 25 hydroxy (Completed)    UA w Reflex to Microscopic w Reflex to Culture -Lab Collect (Completed)    Microalbumin / creatinine urine ratio (Completed)    Urine Microscopic (Completed)       Cardiovascular and Mediastinum    Essential hypertension (Chronic)    Relevant Medications    lisinopril (ZESTRIL) 10 mg tablet    metoprolol succinate (TOPROL-XL) 25 mg 24 hr tablet    Other Relevant Orders    CBC and differential (Completed)    Comprehensive metabolic panel (Completed)    TSH, 3rd generation with Free T4 reflex (Completed)    Lipid panel (Completed)    HEMOGLOBIN A1C W/ EAG ESTIMATION (Completed)    Vitamin D 25 hydroxy (Completed)    UA w Reflex to Microscopic w Reflex to Culture -Lab Collect (Completed)    Microalbumin / creatinine urine ratio (Completed)    Urine Microscopic (Completed)    Chronic systolic congestive heart failure (HCC)    Relevant Medications    metoprolol succinate (TOPROL-XL) 25 mg 24 hr tablet    Other Relevant Orders    Ambulatory referral to Cardiology    CBC and differential (Completed)    Comprehensive metabolic panel (Completed)    TSH, 3rd generation with Free T4 reflex (Completed)    Lipid panel (Completed)    HEMOGLOBIN A1C W/ EAG ESTIMATION (Completed)    Vitamin D 25 hydroxy (Completed)    UA w Reflex to Microscopic w Reflex to Culture -Lab Collect (Completed)    Microalbumin / creatinine urine ratio (Completed)    Urine Microscopic (Completed)    Atrial fibrillation (HCC)    Relevant Medications    metoprolol succinate (TOPROL-XL) 25 mg 24 hr tablet    Other Relevant Orders Ambulatory referral to Cardiology    CBC and differential (Completed)    Comprehensive metabolic panel (Completed)    TSH, 3rd generation with Free T4 reflex (Completed)    Lipid panel (Completed)    HEMOGLOBIN A1C W/ EAG ESTIMATION (Completed)    Vitamin D 25 hydroxy (Completed)    UA w Reflex to Microscopic w Reflex to Culture -Lab Collect (Completed)    Microalbumin / creatinine urine ratio (Completed)    Urine Microscopic (Completed)       Musculoskeletal and Integument    DDD (degenerative disc disease), lumbosacral      Other Visit Diagnoses     Encounter for immunization        Relevant Orders    PNEUMOCOCCAL CONJUGATE VACCINE 13-VALENT GREATER THAN 6 MONTHS    PREFERRED: influenza vaccine, [id number], high-dose, PF 0 5 mL, for patients 65 yr+ (FLUZONE HIGH-DOSE)    Primary osteoarthritis of left knee        Relevant Orders    Ambulatory referral to Orthopedic Surgery    Palpitations        Relevant Medications    metoprolol succinate (TOPROL-XL) 25 mg 24 hr tablet    Other Relevant Orders    CBC and differential (Completed)    Comprehensive metabolic panel (Completed)    TSH, 3rd generation with Free T4 reflex (Completed)    Lipid panel (Completed)    HEMOGLOBIN A1C W/ EAG ESTIMATION (Completed)    Vitamin D 25 hydroxy (Completed)    UA w Reflex to Microscopic w Reflex to Culture -Lab Collect (Completed)    Microalbumin / creatinine urine ratio (Completed)    Urine Microscopic (Completed)    Disorder of bone         Relevant Orders    Vitamin D 25 hydroxy (Completed)            Subjective:      Patient ID: Rissa Pain is a 70 y o  female  Hypertension   Patient is a 70 y o  female who presents for follow-up of hypertension  Her high blood pressure was first noted many years ago  Home blood pressure readings: not doing  Salt intake and diet: salt not added to cooking  Usual weight: stable  Associated signs and symptoms: none  Patient denies: blurred vision, chest pain, dyspnea and headache   Use of agents associated with hypertension: none  Medication compliance: taking as prescribed  Apparently she has atrial fibrillation and takes blood thinner for this reason but appears her heart rate is in the low 100's  She used to be on metoprolol 25 mg daily but since she hasn't followed with her cardiologist she wasn't able to get refills on this medication and she has stopped taking it  She has no palpitations or CP but pulse is high today  She feels no dizziness and BP is stable  Will send the refill for this to her pharmacy  Her other issue is CHF with lower extremity edema that gets worse as the day goes  She states she doesn't take any water pill for this condition and has been doing well  She is on lisinopril daily and tolerates it well  She will need referral to cardiology as well  She has Osteoarthritis of the knees as well as the lower back  Her right knee is s/p arthroplasty and left knee is bothering her much  She used to see [facility] but her doctor  and she didn't follow up with them  She needs to get back with orthopedics and discuss her options  She states has had 2 MRI studies already very recently  She is asking to get referral to [address] [place] [address]'s orthopedic today  I don't have her full records today  Referral is made to orthopedics today  She takes Tylenol prn for pain and aches as needed which really is not helping much anymore  She is also diabetic who is diet controlled  Her A1C today at 6 9  She has no weight changes or polydipsia or polyphagia or polyuria symptoms  Denies any chest pain or HA Or dizziness or vision changes  We discussed that she needs full blood work with A1C and will discuss possibly adding metformin to her regimen to improve the blood sugars  Currently she tries to eat low carb diet        The following portions of the patient's history were reviewed and updated as appropriate: allergies, current medications, past family history, past medical history, past social history, past surgical history and problem list     Review of Systems   Constitutional: Negative for appetite change, chills, fatigue, fever and unexpected weight change  HENT: Negative for congestion, ear pain, rhinorrhea and sore throat  Eyes: Negative for visual disturbance  Respiratory: Negative for cough, chest tightness and shortness of breath  Cardiovascular: Negative for chest pain, palpitations and leg swelling  Gastrointestinal: Negative for abdominal pain, blood in stool, constipation and diarrhea  Endocrine: Negative for cold intolerance, heat intolerance, polydipsia, polyphagia and polyuria  Genitourinary: Negative for dysuria, flank pain, menstrual problem and vaginal discharge  Musculoskeletal: Negative for arthralgias and back pain  Skin: Negative for rash  Allergic/Immunologic: Negative for environmental allergies  Neurological: Negative for dizziness, weakness and headaches  Hematological: Negative for adenopathy  Psychiatric/Behavioral: Negative for behavioral problems, decreased concentration and sleep disturbance  The patient is not nervous/anxious and is not hyperactive  Objective:      /80 (BP Location: Right arm, Patient Position: Sitting, Cuff Size: Large)   Pulse 102   Temp 97 5 °F (36 4 °C) (Tympanic)   Ht 5' 5 5" (1 664 m)   Wt 109 kg (240 lb 4 oz)   SpO2 95%   BMI 39 37 kg/m²          Physical Exam   Constitutional: She is oriented to person, place, and time  She appears well-developed and well-nourished  No distress  HENT:   Head: Normocephalic and atraumatic  Nose: Nose normal    Mouth/Throat: Oropharynx is clear and moist    Eyes: Pupils are equal, round, and reactive to light  Conjunctivae are normal  Right eye exhibits no discharge  Left eye exhibits no discharge  Neck: Normal range of motion  Neck supple  No thyromegaly present  Cardiovascular: Normal rate, regular rhythm and normal heart sounds     No murmur heard  Pulmonary/Chest: Effort normal and breath sounds normal  No respiratory distress  She has no wheezes  Abdominal: Soft  Bowel sounds are normal  She exhibits no distension  There is no tenderness  Musculoskeletal: Normal range of motion  She exhibits no edema or tenderness  Lymphadenopathy:     She has no cervical adenopathy  Neurological: She is alert and oriented to person, place, and time  She has normal reflexes  No cranial nerve deficit  Skin: Skin is warm and dry  No rash noted  She is not diaphoretic  No pallor  Nursing note and vitals reviewed        Lab Results   Component Value Date    WBC 6 89 02/15/2019    HGB 15 7 (H) 02/15/2019    HCT 50 7 (H) 02/15/2019     02/15/2019    TRIG 122 02/15/2019    HDL 49 02/15/2019    ALT 21 02/15/2019    AST 22 02/15/2019    K 4 1 02/15/2019     02/15/2019    CREATININE 0 85 02/15/2019    BUN 11 02/15/2019    CO2 27 02/15/2019    INR 1 18 (H) 02/17/2017    GLUF 119 (H) 02/15/2019    HGBA1C 7 2 (H) 02/15/2019     Lab Results   Component Value Date    DUO4PZIXKDLY 2 090 02/15/2019

## 2019-02-15 ENCOUNTER — APPOINTMENT (OUTPATIENT)
Dept: LAB | Facility: CLINIC | Age: 79
End: 2019-02-15
Payer: MEDICARE

## 2019-02-15 DIAGNOSIS — E11.8 TYPE 2 DIABETES MELLITUS WITH COMPLICATION, WITHOUT LONG-TERM CURRENT USE OF INSULIN (HCC): ICD-10-CM

## 2019-02-15 DIAGNOSIS — R00.2 PALPITATIONS: ICD-10-CM

## 2019-02-15 DIAGNOSIS — I10 ESSENTIAL HYPERTENSION: Chronic | ICD-10-CM

## 2019-02-15 DIAGNOSIS — M89.9 DISORDER OF BONE: ICD-10-CM

## 2019-02-15 DIAGNOSIS — I50.22 CHRONIC SYSTOLIC CONGESTIVE HEART FAILURE (HCC): ICD-10-CM

## 2019-02-15 DIAGNOSIS — I48.0 PAROXYSMAL ATRIAL FIBRILLATION (HCC): ICD-10-CM

## 2019-02-15 LAB
25(OH)D3 SERPL-MCNC: 31.4 NG/ML (ref 30–100)
ALBUMIN SERPL BCP-MCNC: 3.3 G/DL (ref 3.5–5)
ALP SERPL-CCNC: 104 U/L (ref 46–116)
ALT SERPL W P-5'-P-CCNC: 21 U/L (ref 12–78)
AMORPH PHOS CRY URNS QL MICRO: ABNORMAL /HPF
ANION GAP SERPL CALCULATED.3IONS-SCNC: 8 MMOL/L (ref 4–13)
AST SERPL W P-5'-P-CCNC: 22 U/L (ref 5–45)
BACTERIA UR QL AUTO: ABNORMAL /HPF
BASOPHILS # BLD AUTO: 0.06 THOUSANDS/ΜL (ref 0–0.1)
BASOPHILS NFR BLD AUTO: 1 % (ref 0–1)
BILIRUB SERPL-MCNC: 1.48 MG/DL (ref 0.2–1)
BILIRUB UR QL STRIP: NEGATIVE
BUN SERPL-MCNC: 11 MG/DL (ref 5–25)
CALCIUM SERPL-MCNC: 10 MG/DL (ref 8.3–10.1)
CHLORIDE SERPL-SCNC: 103 MMOL/L (ref 100–108)
CHOLEST SERPL-MCNC: 243 MG/DL (ref 50–200)
CLARITY UR: CLEAR
CO2 SERPL-SCNC: 27 MMOL/L (ref 21–32)
COLOR UR: YELLOW
CREAT SERPL-MCNC: 0.85 MG/DL (ref 0.6–1.3)
CREAT UR-MCNC: 177 MG/DL
EOSINOPHIL # BLD AUTO: 0.17 THOUSAND/ΜL (ref 0–0.61)
EOSINOPHIL NFR BLD AUTO: 3 % (ref 0–6)
ERYTHROCYTE [DISTWIDTH] IN BLOOD BY AUTOMATED COUNT: 14 % (ref 11.6–15.1)
EST. AVERAGE GLUCOSE BLD GHB EST-MCNC: 160 MG/DL
GFR SERPL CREATININE-BSD FRML MDRD: 67 ML/MIN/1.73SQ M
GLUCOSE P FAST SERPL-MCNC: 119 MG/DL (ref 65–99)
GLUCOSE UR STRIP-MCNC: NEGATIVE MG/DL
HBA1C MFR BLD: 7.2 % (ref 4.2–6.3)
HCT VFR BLD AUTO: 50.7 % (ref 34.8–46.1)
HDLC SERPL-MCNC: 49 MG/DL (ref 40–60)
HGB BLD-MCNC: 15.7 G/DL (ref 11.5–15.4)
HGB UR QL STRIP.AUTO: NEGATIVE
IMM GRANULOCYTES # BLD AUTO: 0.01 THOUSAND/UL (ref 0–0.2)
IMM GRANULOCYTES NFR BLD AUTO: 0 % (ref 0–2)
KETONES UR STRIP-MCNC: ABNORMAL MG/DL
LDLC SERPL CALC-MCNC: 170 MG/DL (ref 0–100)
LEUKOCYTE ESTERASE UR QL STRIP: ABNORMAL
LYMPHOCYTES # BLD AUTO: 2.45 THOUSANDS/ΜL (ref 0.6–4.47)
LYMPHOCYTES NFR BLD AUTO: 36 % (ref 14–44)
MCH RBC QN AUTO: 29.8 PG (ref 26.8–34.3)
MCHC RBC AUTO-ENTMCNC: 31 G/DL (ref 31.4–37.4)
MCV RBC AUTO: 96 FL (ref 82–98)
MICROALBUMIN UR-MCNC: 101 MG/L (ref 0–20)
MICROALBUMIN/CREAT 24H UR: 57 MG/G CREATININE (ref 0–30)
MONOCYTES # BLD AUTO: 0.76 THOUSAND/ΜL (ref 0.17–1.22)
MONOCYTES NFR BLD AUTO: 11 % (ref 4–12)
NEUTROPHILS # BLD AUTO: 3.44 THOUSANDS/ΜL (ref 1.85–7.62)
NEUTS SEG NFR BLD AUTO: 49 % (ref 43–75)
NITRITE UR QL STRIP: NEGATIVE
NON-SQ EPI CELLS URNS QL MICRO: ABNORMAL /HPF
NONHDLC SERPL-MCNC: 194 MG/DL
NRBC BLD AUTO-RTO: 0 /100 WBCS
PH UR STRIP.AUTO: 8 [PH] (ref 4.5–8)
PLATELET # BLD AUTO: 339 THOUSANDS/UL (ref 149–390)
PMV BLD AUTO: 11.3 FL (ref 8.9–12.7)
POTASSIUM SERPL-SCNC: 4.1 MMOL/L (ref 3.5–5.3)
PROT SERPL-MCNC: 7.9 G/DL (ref 6.4–8.2)
PROT UR STRIP-MCNC: ABNORMAL MG/DL
RBC # BLD AUTO: 5.27 MILLION/UL (ref 3.81–5.12)
RBC #/AREA URNS AUTO: ABNORMAL /HPF
SODIUM SERPL-SCNC: 138 MMOL/L (ref 136–145)
SP GR UR STRIP.AUTO: 1.01 (ref 1–1.03)
TRI-PHOS CRY URNS QL MICRO: ABNORMAL /HPF
TRIGL SERPL-MCNC: 122 MG/DL
TSH SERPL DL<=0.05 MIU/L-ACNC: 2.09 UIU/ML (ref 0.36–3.74)
UROBILINOGEN UR QL STRIP.AUTO: 0.2 E.U./DL
WBC # BLD AUTO: 6.89 THOUSAND/UL (ref 4.31–10.16)
WBC #/AREA URNS AUTO: ABNORMAL /HPF

## 2019-02-15 PROCEDURE — 82043 UR ALBUMIN QUANTITATIVE: CPT | Performed by: FAMILY MEDICINE

## 2019-02-15 PROCEDURE — 83036 HEMOGLOBIN GLYCOSYLATED A1C: CPT

## 2019-02-15 PROCEDURE — 84443 ASSAY THYROID STIM HORMONE: CPT

## 2019-02-15 PROCEDURE — 81001 URINALYSIS AUTO W/SCOPE: CPT | Performed by: FAMILY MEDICINE

## 2019-02-15 PROCEDURE — 80061 LIPID PANEL: CPT

## 2019-02-15 PROCEDURE — 82570 ASSAY OF URINE CREATININE: CPT | Performed by: FAMILY MEDICINE

## 2019-02-15 PROCEDURE — 85025 COMPLETE CBC W/AUTO DIFF WBC: CPT

## 2019-02-15 PROCEDURE — 80053 COMPREHEN METABOLIC PANEL: CPT

## 2019-02-15 PROCEDURE — 36415 COLL VENOUS BLD VENIPUNCTURE: CPT

## 2019-02-15 PROCEDURE — 82306 VITAMIN D 25 HYDROXY: CPT

## 2019-02-19 ENCOUNTER — TELEPHONE (OUTPATIENT)
Dept: FAMILY MEDICINE CLINIC | Facility: OTHER | Age: 79
End: 2019-02-19

## 2019-02-19 NOTE — TELEPHONE ENCOUNTER
Left message for patient to return call    ----- Message from Dorothea Castillo MD sent at 2/19/2019 10:52 AM EST -----  Please have patient return to office to discuss lab results  Shows high cholesterol at 243 and the liver enzyme is slightly high  Will discuss in detail next visit  Thanks

## 2019-03-20 DIAGNOSIS — M25.562 LEFT KNEE PAIN, UNSPECIFIED CHRONICITY: Primary | ICD-10-CM

## 2019-03-26 ENCOUNTER — HOSPITAL ENCOUNTER (OUTPATIENT)
Dept: RADIOLOGY | Facility: HOSPITAL | Age: 79
Discharge: HOME | End: 2019-03-26
Attending: ORTHOPAEDIC SURGERY
Payer: MEDICARE

## 2019-03-26 ENCOUNTER — PREP FOR PROCEDURE (OUTPATIENT)
Dept: OBGYN CLINIC | Facility: HOSPITAL | Age: 79
End: 2019-03-26

## 2019-03-26 ENCOUNTER — OFFICE VISIT (OUTPATIENT)
Dept: OBGYN CLINIC | Facility: HOSPITAL | Age: 79
End: 2019-03-26
Attending: FAMILY MEDICINE
Payer: MEDICARE

## 2019-03-26 ENCOUNTER — TELEPHONE (OUTPATIENT)
Dept: PREADMISSION TESTING | Facility: HOSPITAL | Age: 79
End: 2019-03-26

## 2019-03-26 VITALS
SYSTOLIC BLOOD PRESSURE: 146 MMHG | WEIGHT: 240 LBS | DIASTOLIC BLOOD PRESSURE: 98 MMHG | BODY MASS INDEX: 38.57 KG/M2 | HEIGHT: 66 IN | HEART RATE: 110 BPM

## 2019-03-26 DIAGNOSIS — M25.561 PAIN IN BOTH KNEES, UNSPECIFIED CHRONICITY: ICD-10-CM

## 2019-03-26 DIAGNOSIS — M17.12 PRIMARY OSTEOARTHRITIS OF LEFT KNEE: ICD-10-CM

## 2019-03-26 DIAGNOSIS — M25.562 PAIN IN BOTH KNEES, UNSPECIFIED CHRONICITY: ICD-10-CM

## 2019-03-26 DIAGNOSIS — M25.561 PAIN IN BOTH KNEES, UNSPECIFIED CHRONICITY: Primary | ICD-10-CM

## 2019-03-26 DIAGNOSIS — M25.562 PAIN IN BOTH KNEES, UNSPECIFIED CHRONICITY: Primary | ICD-10-CM

## 2019-03-26 DIAGNOSIS — M25.562 LEFT KNEE PAIN, UNSPECIFIED CHRONICITY: ICD-10-CM

## 2019-03-26 PROCEDURE — 73562 X-RAY EXAM OF KNEE 3: CPT

## 2019-03-26 PROCEDURE — 99203 OFFICE O/P NEW LOW 30 MIN: CPT | Performed by: ORTHOPAEDIC SURGERY

## 2019-03-26 RX ORDER — ACETAMINOPHEN 325 MG/1
975 TABLET ORAL ONCE
Status: CANCELLED | OUTPATIENT
Start: 2019-03-26 | End: 2019-03-26

## 2019-03-26 RX ORDER — ASCORBIC ACID 500 MG
500 TABLET ORAL 2 TIMES DAILY
Qty: 60 TABLET | Refills: 0 | Status: SHIPPED | OUTPATIENT
Start: 2019-03-26 | End: 2019-04-01

## 2019-03-26 RX ORDER — FERROUS SULFATE TAB EC 324 MG (65 MG FE EQUIVALENT) 324 (65 FE) MG
324 TABLET DELAYED RESPONSE ORAL
Qty: 60 TABLET | Refills: 0 | Status: SHIPPED | OUTPATIENT
Start: 2019-03-26 | End: 2019-04-01

## 2019-03-26 RX ORDER — FOLIC ACID 1 MG/1
1 TABLET ORAL DAILY
Qty: 30 TABLET | Refills: 0 | Status: SHIPPED | OUTPATIENT
Start: 2019-03-26 | End: 2019-04-01

## 2019-03-26 RX ORDER — CEFAZOLIN SODIUM 2 G/50ML
2000 SOLUTION INTRAVENOUS ONCE
Status: CANCELLED | OUTPATIENT
Start: 2019-03-26 | End: 2019-03-26

## 2019-03-26 RX ORDER — SODIUM CHLORIDE, SODIUM LACTATE, POTASSIUM CHLORIDE, CALCIUM CHLORIDE 600; 310; 30; 20 MG/100ML; MG/100ML; MG/100ML; MG/100ML
125 INJECTION, SOLUTION INTRAVENOUS CONTINUOUS
Status: CANCELLED | OUTPATIENT
Start: 2019-03-26

## 2019-03-26 RX ORDER — CHLORHEXIDINE GLUCONATE 4 G/100ML
SOLUTION TOPICAL DAILY PRN
Status: CANCELLED | OUTPATIENT
Start: 2019-03-26

## 2019-03-26 RX ORDER — CHLORHEXIDINE GLUCONATE 0.12 MG/ML
15 RINSE ORAL ONCE
Status: CANCELLED | OUTPATIENT
Start: 2019-03-26 | End: 2019-03-26

## 2019-03-26 RX ORDER — GABAPENTIN 300 MG/1
300 CAPSULE ORAL ONCE
Status: CANCELLED | OUTPATIENT
Start: 2019-03-26 | End: 2019-03-26

## 2019-03-26 NOTE — PROGRESS NOTES
Patient Name:  Brody Mulligan  MRN:  [id number]    Assessment & Plan     Left knee osteoarthritis   1  Due to failed CSI's and visco supplement injections in the past, it is not recommend today  2  Discussed left total knee replacement at length today  Risks, benefits and complications discussed, due to previous left knee surgery tracy is at a greater risk of an infection  3  Tracy's previous left knee incision will be used for her left total knee surgery   4  Total knee pamphlet was provided today   5  Cardiology clearance will be required prior to surgical intervention   6  Left total knee surgical consent signed in the office today   7  Follow up 10-14 days after surgery       Chief Complaint     Left knee pain       History of the Present Illness     Brody Mulligan is a 70 y o  female who presents to the office today for right knee pain  Ancelmo Thomas states that she began experiencing left knee pain last spring after a fall  Ancelmo Thomas states that she has tried the 3 series visco injections as well as CSI's aprox  1 year ago, without relief of her symptoms  Ancelmo Thomas states that her left knee pain is constant  She notes at its best her pain is 8/10 and at its worst her pain is 10/10  She notes her pain is to the whole left knee  Ancelmo Thomas notes that walking up or down steps causes increased knee pain  She states rest makes her knee pain better  Ancelmo Thomas also states that she has to walk with her cane, which she normally does not use for ambulation  She is taking Tylenol as needed for pain control  The patient states her left knee pain wakes her up from sleep at night  Ancelmo Thomas has a history of meniscus surgery to her left knee may years ago  Ancelmo Thomas has a right total knee replacement that was done aprox  3 years ago, with a revision to a long stem, due to loosening  This was done at [address] [place] [address]  is on Eliquis 5mg  Ancelmo Thomas has 1 floor living          Physical Exam     /98   Pulse (!) 110   Ht 5' 5 5" (1 664 m)   Wt 109 kg (240 lb)   BMI 39 33 kg/m²     Left knee:  Ambulates with the use of a cane  Well healed vertical scar  No effusion   ROM 0-110 degree's   Stable medial and lateral collateral ligaments to testing   Skin intact        Eyes:  Anicteric sclerae  Neck:  Supple  Lungs:  Unlabored breathing  Cardiovascular:  Capillary refill is less than 2 seconds  Skin:  Intact without erythema  Neurologic:  Sensation intact to light touch  Psychiatric:  Mood and affect are appropriate  Data Review     I have personally reviewed pertinent films in PACS, and my interpretation follows:    Left knee x-ray: Severe osteoarthritis       Past Medical History:   Diagnosis Date    Atrial fibrillation ([facility] )     Breast cancer ([facility] )     CHF (congestive heart failure) ([facility] )     Diabetes mellitus ([facility] )        Past Surgical History:   Procedure Laterality Date    CHOLECYSTECTOMY      HYSTERECTOMY      JOINT REPLACEMENT      right knee    SPLENECTOMY         Allergies   Allergen Reactions    Celecoxib     Demerol [Meperidine]     Influenza Vaccines     Levaquin [Levofloxacin]     Morphine     Penicillins     Percocet [Oxycodone-Acetaminophen]     Pneumococcal Vaccines        Current Outpatient Medications on File Prior to Visit   Medication Sig Dispense Refill    Acetaminophen 500 MG Take 325 mg by mouth every 6 (six) hours as needed      apixaban (ELIQUIS) 5 mg Take 5 mg by mouth      lisinopril (ZESTRIL) 10 mg tablet Take 10 mg by mouth daily Blood presuure  5    metoprolol succinate (TOPROL-XL) 25 mg 24 hr tablet Take 1 tablet (25 mg total) by mouth daily 30 tablet 3    omeprazole (PriLOSEC) 20 mg delayed release capsule Take 20 mg by mouth daily       No current facility-administered medications on file prior to visit          Social History     Tobacco Use    Smoking status: Never Smoker    Smokeless tobacco: Never Used   Substance Use Topics    Alcohol use: No    Drug use: No       Family History   Problem Relation Age of Onset    Kidney failure Brother     Heart disease Brother        Review of Systems     As stated in the HPI  All other systems were reviewed and are negative        Scribe Attestation    I,:   Rosas Hoang am acting as a scribe while in the presence of the attending physician :        I,:   Abilio Barfield MD personally performed the services described in this documentation    as scribed in my presence :

## 2019-03-29 ENCOUNTER — TELEPHONE (OUTPATIENT)
Dept: OBGYN CLINIC | Facility: HOSPITAL | Age: 79
End: 2019-03-29

## 2019-03-29 ENCOUNTER — HOSPITAL ENCOUNTER (OUTPATIENT)
Dept: RADIOLOGY | Facility: HOSPITAL | Age: 79
Discharge: HOME | End: 2019-03-29
Payer: MEDICARE

## 2019-03-29 ENCOUNTER — APPOINTMENT (OUTPATIENT)
Dept: LAB | Facility: HOSPITAL | Age: 79
End: 2019-03-29
Payer: MEDICARE

## 2019-03-29 DIAGNOSIS — M17.12 PRIMARY OSTEOARTHRITIS OF LEFT KNEE: ICD-10-CM

## 2019-03-29 LAB
ABO GROUP BLD: NORMAL
ALBUMIN SERPL BCP-MCNC: 3.1 G/DL (ref 3.5–5)
ALP SERPL-CCNC: 101 U/L (ref 46–116)
ALT SERPL W P-5'-P-CCNC: 15 U/L (ref 12–78)
ANION GAP SERPL CALCULATED.3IONS-SCNC: 3 MMOL/L (ref 4–13)
AST SERPL W P-5'-P-CCNC: 18 U/L (ref 5–45)
ATRIAL RATE: 100 BPM
BACTERIA UR QL AUTO: ABNORMAL /HPF
BASOPHILS # BLD AUTO: 0.07 THOUSANDS/ΜL (ref 0–0.1)
BASOPHILS NFR BLD AUTO: 1 % (ref 0–1)
BILIRUB SERPL-MCNC: 1.2 MG/DL (ref 0.2–1)
BILIRUB UR QL STRIP: NEGATIVE
BLD GP AB SCN SERPL QL: NEGATIVE
BUN SERPL-MCNC: 11 MG/DL (ref 5–25)
CALCIUM SERPL-MCNC: 9.9 MG/DL (ref 8.3–10.1)
CHLORIDE SERPL-SCNC: 106 MMOL/L (ref 100–108)
CLARITY UR: CLEAR
CO2 SERPL-SCNC: 28 MMOL/L (ref 21–32)
COLOR UR: YELLOW
CREAT SERPL-MCNC: 0.81 MG/DL (ref 0.6–1.3)
CRP SERPL QL: 3.8 MG/L
EOSINOPHIL # BLD AUTO: 0.24 THOUSAND/ΜL (ref 0–0.61)
EOSINOPHIL NFR BLD AUTO: 4 % (ref 0–6)
ERYTHROCYTE [DISTWIDTH] IN BLOOD BY AUTOMATED COUNT: 14.2 % (ref 11.6–15.1)
EST. AVERAGE GLUCOSE BLD GHB EST-MCNC: 154 MG/DL
FERRITIN SERPL-MCNC: 14 NG/ML (ref 8–388)
GFR SERPL CREATININE-BSD FRML MDRD: 71 ML/MIN/1.73SQ M
GLUCOSE P FAST SERPL-MCNC: 124 MG/DL (ref 65–99)
GLUCOSE UR STRIP-MCNC: NEGATIVE MG/DL
HBA1C MFR BLD: 7 % (ref 4.2–6.3)
HCT VFR BLD AUTO: 50.2 % (ref 34.8–46.1)
HGB BLD-MCNC: 15.9 G/DL (ref 11.5–15.4)
HGB UR QL STRIP.AUTO: NEGATIVE
HYALINE CASTS #/AREA URNS LPF: ABNORMAL /LPF
IMM GRANULOCYTES # BLD AUTO: 0.02 THOUSAND/UL (ref 0–0.2)
IMM GRANULOCYTES NFR BLD AUTO: 0 % (ref 0–2)
INR PPP: 1.11 (ref 0.86–1.17)
IRON SATN MFR SERPL: 19 %
IRON SERPL-MCNC: 85 UG/DL (ref 50–170)
KETONES UR STRIP-MCNC: NEGATIVE MG/DL
LEUKOCYTE ESTERASE UR QL STRIP: ABNORMAL
LYMPHOCYTES # BLD AUTO: 2.22 THOUSANDS/ΜL (ref 0.6–4.47)
LYMPHOCYTES NFR BLD AUTO: 32 % (ref 14–44)
MCH RBC QN AUTO: 29 PG (ref 26.8–34.3)
MCHC RBC AUTO-ENTMCNC: 31.7 G/DL (ref 31.4–37.4)
MCV RBC AUTO: 91 FL (ref 82–98)
MONOCYTES # BLD AUTO: 0.77 THOUSAND/ΜL (ref 0.17–1.22)
MONOCYTES NFR BLD AUTO: 11 % (ref 4–12)
NEUTROPHILS # BLD AUTO: 3.57 THOUSANDS/ΜL (ref 1.85–7.62)
NEUTS SEG NFR BLD AUTO: 52 % (ref 43–75)
NITRITE UR QL STRIP: NEGATIVE
NON-SQ EPI CELLS URNS QL MICRO: ABNORMAL /HPF
NRBC BLD AUTO-RTO: 0 /100 WBCS
PH UR STRIP.AUTO: 6 [PH]
PLATELET # BLD AUTO: 285 THOUSANDS/UL (ref 149–390)
PMV BLD AUTO: 10.9 FL (ref 8.9–12.7)
POTASSIUM SERPL-SCNC: 4 MMOL/L (ref 3.5–5.3)
PROT SERPL-MCNC: 7.7 G/DL (ref 6.4–8.2)
PROT UR STRIP-MCNC: NEGATIVE MG/DL
PROTHROMBIN TIME: 14.4 SECONDS (ref 11.8–14.2)
QRS AXIS: 20 DEGREES
QRSD INTERVAL: 98 MS
QT INTERVAL: 382 MS
QTC INTERVAL: 457 MS
RBC # BLD AUTO: 5.49 MILLION/UL (ref 3.81–5.12)
RBC #/AREA URNS AUTO: ABNORMAL /HPF
RH BLD: POSITIVE
SODIUM SERPL-SCNC: 137 MMOL/L (ref 136–145)
SP GR UR STRIP.AUTO: 1.02 (ref 1–1.03)
SPECIMEN EXPIRATION DATE: NORMAL
T WAVE AXIS: 52 DEGREES
TIBC SERPL-MCNC: 446 UG/DL (ref 250–450)
UROBILINOGEN UR QL STRIP.AUTO: 0.2 E.U./DL
VENTRICULAR RATE: 86 BPM
WBC # BLD AUTO: 6.89 THOUSAND/UL (ref 4.31–10.16)
WBC #/AREA URNS AUTO: ABNORMAL /HPF

## 2019-03-29 PROCEDURE — 86901 BLOOD TYPING SEROLOGIC RH(D): CPT

## 2019-03-29 PROCEDURE — 83036 HEMOGLOBIN GLYCOSYLATED A1C: CPT

## 2019-03-29 PROCEDURE — 85025 COMPLETE CBC W/AUTO DIFF WBC: CPT

## 2019-03-29 PROCEDURE — 83550 IRON BINDING TEST: CPT

## 2019-03-29 PROCEDURE — 86850 RBC ANTIBODY SCREEN: CPT

## 2019-03-29 PROCEDURE — 85610 PROTHROMBIN TIME: CPT

## 2019-03-29 PROCEDURE — 86900 BLOOD TYPING SEROLOGIC ABO: CPT

## 2019-03-29 PROCEDURE — 80053 COMPREHEN METABOLIC PANEL: CPT

## 2019-03-29 PROCEDURE — 93010 ELECTROCARDIOGRAM REPORT: CPT | Performed by: INTERNAL MEDICINE

## 2019-03-29 PROCEDURE — 82728 ASSAY OF FERRITIN: CPT

## 2019-03-29 PROCEDURE — 71046 X-RAY EXAM CHEST 2 VIEWS: CPT

## 2019-03-29 PROCEDURE — 83540 ASSAY OF IRON: CPT

## 2019-03-29 PROCEDURE — 36415 COLL VENOUS BLD VENIPUNCTURE: CPT

## 2019-03-29 PROCEDURE — 81001 URINALYSIS AUTO W/SCOPE: CPT | Performed by: ORTHOPAEDIC SURGERY

## 2019-03-29 PROCEDURE — 86140 C-REACTIVE PROTEIN: CPT

## 2019-03-29 PROCEDURE — 93005 ELECTROCARDIOGRAM TRACING: CPT

## 2019-03-30 ENCOUNTER — TRANSCRIBE ORDERS (OUTPATIENT)
Dept: LAB | Facility: HOSPITAL | Age: 79
End: 2019-03-30

## 2019-03-30 DIAGNOSIS — M17.12 PRIMARY OSTEOARTHRITIS OF LEFT KNEE: Primary | ICD-10-CM

## 2019-04-01 ENCOUNTER — OFFICE VISIT (OUTPATIENT)
Dept: FAMILY MEDICINE CLINIC | Facility: OTHER | Age: 79
End: 2019-04-01
Payer: MEDICARE

## 2019-04-01 VITALS
SYSTOLIC BLOOD PRESSURE: 124 MMHG | OXYGEN SATURATION: 96 % | HEART RATE: 100 BPM | DIASTOLIC BLOOD PRESSURE: 86 MMHG | BODY MASS INDEX: 37.93 KG/M2 | TEMPERATURE: 97.4 F | WEIGHT: 236 LBS | HEIGHT: 66 IN

## 2019-04-01 DIAGNOSIS — R17 ELEVATED BILIRUBIN: ICD-10-CM

## 2019-04-01 DIAGNOSIS — Z28.21 VACCINATION REFUSED BY PATIENT: ICD-10-CM

## 2019-04-01 DIAGNOSIS — I10 ESSENTIAL HYPERTENSION: Chronic | ICD-10-CM

## 2019-04-01 DIAGNOSIS — Z11.59 ENCOUNTER FOR SCREENING FOR OTHER VIRAL DISEASES: ICD-10-CM

## 2019-04-01 DIAGNOSIS — E11.8 TYPE 2 DIABETES MELLITUS WITH COMPLICATION, WITHOUT LONG-TERM CURRENT USE OF INSULIN (HCC): Primary | ICD-10-CM

## 2019-04-01 DIAGNOSIS — K21.9 GASTROESOPHAGEAL REFLUX DISEASE WITHOUT ESOPHAGITIS: ICD-10-CM

## 2019-04-01 DIAGNOSIS — E66.9 OBESITY (BMI 35.0-39.9 WITHOUT COMORBIDITY): ICD-10-CM

## 2019-04-01 PROCEDURE — 99214 OFFICE O/P EST MOD 30 MIN: CPT | Performed by: FAMILY MEDICINE

## 2019-04-17 ENCOUNTER — TELEPHONE (OUTPATIENT)
Dept: CARDIOLOGY CLINIC | Facility: CLINIC | Age: 79
End: 2019-04-17

## 2019-04-24 ENCOUNTER — OFFICE VISIT (OUTPATIENT)
Dept: CARDIOLOGY CLINIC | Facility: CLINIC | Age: 79
End: 2019-04-24
Payer: MEDICARE

## 2019-04-24 VITALS
HEIGHT: 66 IN | HEART RATE: 96 BPM | WEIGHT: 237.8 LBS | BODY MASS INDEX: 38.22 KG/M2 | DIASTOLIC BLOOD PRESSURE: 82 MMHG | SYSTOLIC BLOOD PRESSURE: 132 MMHG

## 2019-04-24 DIAGNOSIS — I10 ESSENTIAL HYPERTENSION: Chronic | ICD-10-CM

## 2019-04-24 DIAGNOSIS — I48.21 PERMANENT ATRIAL FIBRILLATION (HCC): Primary | ICD-10-CM

## 2019-04-24 DIAGNOSIS — I42.8 NICM (NONISCHEMIC CARDIOMYOPATHY) (HCC): Chronic | ICD-10-CM

## 2019-04-24 PROCEDURE — 1124F ACP DISCUSS-NO DSCNMKR DOCD: CPT | Performed by: PHYSICIAN ASSISTANT

## 2019-04-24 PROCEDURE — 99214 OFFICE O/P EST MOD 30 MIN: CPT | Performed by: PHYSICIAN ASSISTANT

## 2019-04-24 PROCEDURE — 93000 ELECTROCARDIOGRAM COMPLETE: CPT | Performed by: PHYSICIAN ASSISTANT

## 2019-04-24 RX ORDER — METOPROLOL SUCCINATE 50 MG/1
50 TABLET, EXTENDED RELEASE ORAL DAILY
Qty: 30 TABLET | Refills: 3 | Status: SHIPPED | OUTPATIENT
Start: 2019-04-24

## 2019-04-29 ENCOUNTER — TELEPHONE (OUTPATIENT)
Dept: CARDIOLOGY CLINIC | Facility: CLINIC | Age: 79
End: 2019-04-29

## 2021-11-04 ENCOUNTER — OFFICE VISIT (OUTPATIENT)
Dept: GASTROENTEROLOGY | Facility: CLINIC | Age: 81
End: 2021-11-04
Payer: MEDICARE

## 2021-11-04 ENCOUNTER — TELEPHONE (OUTPATIENT)
Dept: GASTROENTEROLOGY | Facility: CLINIC | Age: 81
End: 2021-11-04

## 2021-11-04 VITALS
HEART RATE: 94 BPM | SYSTOLIC BLOOD PRESSURE: 155 MMHG | WEIGHT: 223 LBS | DIASTOLIC BLOOD PRESSURE: 86 MMHG | BODY MASS INDEX: 35.84 KG/M2 | HEIGHT: 66 IN

## 2021-11-04 DIAGNOSIS — R10.13 EPIGASTRIC PAIN: ICD-10-CM

## 2021-11-04 DIAGNOSIS — E66.01 OBESITY, MORBID (HCC): ICD-10-CM

## 2021-11-04 DIAGNOSIS — K21.9 GASTROESOPHAGEAL REFLUX DISEASE WITHOUT ESOPHAGITIS: Primary | Chronic | ICD-10-CM

## 2021-11-04 DIAGNOSIS — K44.9 HIATAL HERNIA: ICD-10-CM

## 2021-11-04 DIAGNOSIS — K29.70 GASTRITIS WITHOUT BLEEDING, UNSPECIFIED CHRONICITY, UNSPECIFIED GASTRITIS TYPE: ICD-10-CM

## 2021-11-04 PROCEDURE — 99214 OFFICE O/P EST MOD 30 MIN: CPT | Performed by: INTERNAL MEDICINE

## 2021-11-04 RX ORDER — OMEPRAZOLE 20 MG/1
20 CAPSULE, DELAYED RELEASE ORAL DAILY
Qty: 30 CAPSULE | Refills: 5 | Status: SHIPPED | OUTPATIENT
Start: 2021-11-04

## 2021-11-15 ENCOUNTER — TELEPHONE (OUTPATIENT)
Dept: GASTROENTEROLOGY | Facility: HOSPITAL | Age: 81
End: 2021-11-15

## 2021-11-19 ENCOUNTER — ANESTHESIA EVENT (OUTPATIENT)
Dept: GASTROENTEROLOGY | Facility: HOSPITAL | Age: 81
End: 2021-11-19

## 2021-11-19 ENCOUNTER — ANESTHESIA (OUTPATIENT)
Dept: GASTROENTEROLOGY | Facility: HOSPITAL | Age: 81
End: 2021-11-19

## 2021-11-19 ENCOUNTER — HOSPITAL ENCOUNTER (OUTPATIENT)
Dept: GASTROENTEROLOGY | Facility: HOSPITAL | Age: 81
Setting detail: OUTPATIENT SURGERY
Discharge: HOME | End: 2021-11-19
Attending: INTERNAL MEDICINE
Payer: MEDICARE

## 2021-11-19 VITALS
TEMPERATURE: 98.5 F | BODY MASS INDEX: 35.16 KG/M2 | HEIGHT: 67 IN | DIASTOLIC BLOOD PRESSURE: 70 MMHG | OXYGEN SATURATION: 94 % | RESPIRATION RATE: 18 BRPM | HEART RATE: 79 BPM | SYSTOLIC BLOOD PRESSURE: 126 MMHG | WEIGHT: 224 LBS

## 2021-11-19 DIAGNOSIS — K44.9 HIATAL HERNIA: ICD-10-CM

## 2021-11-19 DIAGNOSIS — R10.13 EPIGASTRIC PAIN: ICD-10-CM

## 2021-11-19 DIAGNOSIS — K21.9 GASTROESOPHAGEAL REFLUX DISEASE WITHOUT ESOPHAGITIS: ICD-10-CM

## 2021-11-19 LAB — GLUCOSE SERPL-MCNC: 117 MG/DL (ref 65–140)

## 2021-11-19 PROCEDURE — 43251 EGD REMOVE LESION SNARE: CPT | Performed by: INTERNAL MEDICINE

## 2021-11-19 PROCEDURE — 82948 REAGENT STRIP/BLOOD GLUCOSE: CPT

## 2021-11-19 PROCEDURE — 43239 EGD BIOPSY SINGLE/MULTIPLE: CPT | Performed by: INTERNAL MEDICINE

## 2021-11-19 PROCEDURE — 88305 TISSUE EXAM BY PATHOLOGIST: CPT | Performed by: PATHOLOGY

## 2021-11-19 RX ORDER — PROPOFOL 10 MG/ML
INJECTION, EMULSION INTRAVENOUS AS NEEDED
Status: DISCONTINUED | OUTPATIENT
Start: 2021-11-19 | End: 2021-11-19

## 2021-11-19 RX ORDER — SODIUM CHLORIDE, SODIUM LACTATE, POTASSIUM CHLORIDE, CALCIUM CHLORIDE 600; 310; 30; 20 MG/100ML; MG/100ML; MG/100ML; MG/100ML
INJECTION, SOLUTION INTRAVENOUS CONTINUOUS PRN
Status: DISCONTINUED | OUTPATIENT
Start: 2021-11-19 | End: 2021-11-19

## 2021-11-19 RX ADMIN — PROPOFOL 100 MG: 10 INJECTION, EMULSION INTRAVENOUS at 09:37

## 2021-11-19 RX ADMIN — PROPOFOL 20 MG: 10 INJECTION, EMULSION INTRAVENOUS at 09:41

## 2021-11-19 RX ADMIN — SODIUM CHLORIDE, SODIUM LACTATE, POTASSIUM CHLORIDE, AND CALCIUM CHLORIDE: .6; .31; .03; .02 INJECTION, SOLUTION INTRAVENOUS at 09:30

## 2022-03-31 ENCOUNTER — HOSPITAL ENCOUNTER (EMERGENCY)
Facility: HOSPITAL | Age: 82
Discharge: NURSING, REHAB OR LONG-TERM CARE | End: 2022-03-31
Attending: EMERGENCY MEDICINE | Admitting: EMERGENCY MEDICINE
Payer: MEDICARE

## 2022-03-31 VITALS
DIASTOLIC BLOOD PRESSURE: 89 MMHG | TEMPERATURE: 98 F | OXYGEN SATURATION: 91 % | HEART RATE: 80 BPM | RESPIRATION RATE: 17 BRPM | SYSTOLIC BLOOD PRESSURE: 142 MMHG

## 2022-03-31 DIAGNOSIS — T69.9XXA COLD EXPOSURE: Primary | ICD-10-CM

## 2022-03-31 PROCEDURE — 99285 EMERGENCY DEPT VISIT HI MDM: CPT

## 2022-03-31 PROCEDURE — 99284 EMERGENCY DEPT VISIT MOD MDM: CPT | Performed by: EMERGENCY MEDICINE

## 2022-03-31 NOTE — CASE MANAGEMENT
Case Management ED Assessment    Patient name Mackenzie Portillo  Location ED 07/ED  MRN [id number]  : 1940 Date 3/31/2022        OBJECTIVE:  Predictive Model Details         14% Factor Value    Risk of Hospital Admission or ED Visit Model Is in Relationship Yes     Number of ED Visits 1     Has Atrial Fibrillation Yes     Has Medicare Yes     Has CVD Yes     Has Diabetes Yes     Has CHF Yes     Has PCP Yes            Chief Complaint: Tendency to wander, Cold exposure   Patient Class: Emergency  Preferred Pharmacy:   [address] [place], [place] [address] [address]  Phone: [phone] Fax: [phone]    Primary Care Provider: Yin Mejía MD    Primary Insurance: MEDICARE  Secondary Insurance: [address] [place] [address]    ED ASSESSMENT:  Readmission Root Cause  30 Day Readmission: No    Outpatient Care Information  Have you seen a doctor in the last year?: Yes  Specify which physician group(s) you have seen in the past year : Other (PCP is Dr Salvatore Collazo with [facility])  Specialist(s) seen outside of [facility] or [facility]: PCP,Other, please specify in comment box  (Follows with Psychiatry @ [address] ([facility]))    Prescribed Medications Prior to Admission/ED Visit  Has patient been prescribed medications (prior to this ED visit/admission)?: No    Patient Information  Admitted from[de-identified] Facility ([facility])  Mental Status: Confused (DEMENTED AT [facility] )  During Assessment patient was accompanied by: Not accompanied during assessment  Assessment information provided by[de-identified] [facility]  Support Systems: Son  [place] of Residence: [address] [place] [address] do you live in?: [employer] entry access options   Select all that apply : No steps to enter home  Type of Current Residence: Facility  Upon entering residence, is there a bedroom on the main floor (no further steps)?: Yes  Upon entering residence, is there a bathroom on the main floor (no further steps)?: Yes  In the last 12 months, was there a time when you were not able to pay the mortgage or rent on time?: No  In the last 12 months, how many places have you lived?: 1  In the last 12 months, was there a time when you did not have a steady place to sleep or slept in a shelter (including now)?: No  Homeless/housing insecurity resource given?: No  Living Arrangements: Other (Comment) ([address] )  Is patient a ?: No    Patient Information Continued  Income Source: SSI/SSD  Does patient have prescription coverage?: Yes  Within the past 12 months, you worried that your food would run out before you got the money to buy more : Never true  Within the past 12 months, the food you bought just didnt last and you didnt have money to get more : Never true  Food insecurity resource given?: No  Does patient receive dialysis treatments?: No  Does patient have a history of substance abuse?: No  Does patient have a history of Mental Health Diagnosis?: No    Food and Transportation  What is patient's usual means of transportation?: Family Transport

## 2022-03-31 NOTE — ED NOTES
Beth Almaraz from [facility] called to give brief information on situation for patient  Per Beth Almaraz patient was found wondering outside in the cold, patient has been incontinent of BM and has been having increased altered mental status  Rn informed patient that this RN is not primary nurse but patient has been seen and is up for discharge and awaiting transport home  Per Beth Almaraz, patient cannot go back to same location as before d/t patient elopement  Per av she will find another location for patient and call us back  RN advised av scheduled  is for 10 am  Beth Almaraz states will call back before then  Charge RN made aware        Gregor Jernigan RN  03/31/22 1011
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
## 2022-03-31 NOTE — ED NOTES
This RN spoke with Hospital  Andres Jones in regards to [facility] refusal to take patient back until they can find another place for her  Andres Doctor will call back with an update, he was made aware that  time is 1000       April Joo Dumont RN  03/31/22 0532

## 2022-03-31 NOTE — ED NOTES
Upon case management calling to advise that patient can return to unit, [facility] called to let us know they will not accept this patient as there are no more rooms for her there  Made case management aware       April Joo Dumont RN  03/31/22 2096

## 2022-03-31 NOTE — CASE MANAGEMENT
Case Management ED Discharge Planning Note    Patient name Dru Cruz  Location ED 07/ED 07 MRN [id number]  : 1940 Date 3/31/2022        OBJECTIVE:  Predictive Model Details         14% Factor Value    Risk of Hospital Admission or ED Visit Model Is in Relationship Yes     Number of ED Visits 1     Has Atrial Fibrillation Yes     Has Medicare Yes     Has CVD Yes     Has Diabetes Yes     Has CHF Yes     Has PCP Yes            Chief Complaint: Tendency to wander, Cold exposure     Patient Class: Emergency  Preferred Pharmacy:   [facility] [address] [place] [address]  [id number] [place] [address]  Phone: [phone] Fax: [phone]    Primary Care Provider: Valerie Richardson MD    Primary Insurance: MEDICARE  Secondary Insurance: [address] [place] [address]    ED Discharge Details:    Discharge planning discussed with[de-identified] PATIENT'S SON  Freedom of Choice: Yes  Comments - Freedom of Choice: CHOICE IS TO RETURN TO [facility]  CM contacted family/caregiver?: Yes  Were Treatment Team discharge recommendations reviewed with patient/caregiver?: Yes  Did patient/caregiver verbalize understanding of patient care needs?: N/A- going to facility  Were patient/caregiver advised of the risks associated with not following Treatment Team discharge recommendations?: Yes    Contacts  Patient Contacts: Diane Bliss  Relationship to Patient[de-identified] Family  Contact Method: Phone  Phone Number: [phone]  Reason/Outcome: Continuity of [address]          Is the patient interested in [facility] at discharge?: No    DME Referral Provided  Referral made for DME?: No    Other Referral/Resources/Interventions Provided:  Interventions: Facility Return,SNF    Would you like to participate in our [address] [place] [address] program?  : No - Declined    Treatment Team Recommendation: Facility Return  Discharge Destination Plan[de-identified] Facility Return     Transport at Discharge : hospitals Ambulance  Dispatcher Contacted: Yes ([facility])  Number/Name of Dispatcher: ASHUTOSH  ETA of Transport (Date): 03/31/22  ETA of Transport (Time): 1000     Additional Comments: CM made aware by Charge RN that [facility] is refusing to take patient back from ED with a 1000 AM pick-up  CM aware that patient is medically stable for D/C back to facility this AM  CM called patient's son by phone to confirm that patient is LTC at facility  Son informed this CM that psychiatry was following patient at [facility] for potential change in psych medication however son complained that he was never given any update on if patient's psych meds were changed to help manage patient's dementia  Son has requested that psych @ [facility] reach out to patient's PCP (Dr Joie Hernandez) to discuss change in medications prior to implementing change in medications - son reports that he was never given any updates and not aware of any change in meds at this time  Son stating that he plans to visit patient back at facility today to discuss his concerns with lack of follow-up with him regarding patient and any change in medications  CM attempted to call [facility] back at [phone] - CM was directed to contact Shereen Lana @ [phone]  CM s/w Renetta to review that patient is not on OBS or INPT stay and does not meet criteria to be admitted to hospital at this time and will be returning as previously arranged with a [address]86 pick-up  Shereen Schwartz explained that she was attempting to have patient return to a more secured section at facility, however no bed is available at this time  CM re-educated as per ED work-up patient does not meet criteria for admission and cannot be held in ED while facility attempts to work on a different bed at facility for patient  CM informed Renetta of patient's son's concerns and stating that she will follow-up with patient's son regarding his concerns       [facility] ED Charge RN made aware that facility is aware that patient is returning to 1000 pick-up today and that bed change will happen pending bed availability at patient's return

## 2022-03-31 NOTE — ED NOTES
ASHUTOSH called for transport      [facility], [address] [facility]  03/31/22 0096

## 2022-03-31 NOTE — ED NOTES
Patient denies complaints at this time  Comfort and safety measures provided  Aware awaiting transport        Kiana Kapoor RN  03/31/22 3110

## 2022-03-31 NOTE — ED NOTES
Ralph Schmidt from [facility] called back stating she is still unable to find placement for patient  She asked if we are able to delay  time until she is able to  RN states that unfortunately we are unable to delay transport due to placement  Ralph Schmidt states that she is going to keep trying to contact admissions there and will call us back before 10  Call back number for Ralph Schmidt is   Charge RN made aware        Toshia Phillips RN  03/31/22 9922

## 2022-06-21 ENCOUNTER — TELEPHONE (OUTPATIENT)
Dept: OTHER | Facility: OTHER | Age: 82
End: 2022-06-21

## 2022-06-22 NOTE — TELEPHONE ENCOUNTER
Beverly Zarate from [facility] [employer] [facility] would like a call back from the On Call Provider  Juventino Levy today a couple of times and is now complaining her left upper leg and hip are bothering her  She's crying that it hurts  The nurse wants to know what you want to do

## 2022-07-28 ENCOUNTER — APPOINTMENT (EMERGENCY)
Dept: CT IMAGING | Facility: HOSPITAL | Age: 82
End: 2022-07-28
Payer: MEDICARE

## 2022-07-28 ENCOUNTER — HOSPITAL ENCOUNTER (EMERGENCY)
Facility: HOSPITAL | Age: 82
Discharge: NURSING, REHAB OR LONG-TERM CARE | End: 2022-07-28
Attending: SURGERY
Payer: MEDICARE

## 2022-07-28 ENCOUNTER — APPOINTMENT (EMERGENCY)
Dept: RADIOLOGY | Facility: HOSPITAL | Age: 82
End: 2022-07-28
Payer: MEDICARE

## 2022-07-28 VITALS
TEMPERATURE: 97.8 F | DIASTOLIC BLOOD PRESSURE: 88 MMHG | HEART RATE: 72 BPM | RESPIRATION RATE: 18 BRPM | WEIGHT: 257.94 LBS | SYSTOLIC BLOOD PRESSURE: 192 MMHG | OXYGEN SATURATION: 94 %
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
## 2022-07-28 LAB
ALBUMIN SERPL BCP-MCNC: 3.1 G/DL (ref 3.5–5)
ALP SERPL-CCNC: 110 U/L (ref 34–104)
ALT SERPL W P-5'-P-CCNC: 12 U/L (ref 7–52)
ANION GAP SERPL CALCULATED.3IONS-SCNC: 5 MMOL/L (ref 4–13)
APTT PPP: 30 SECONDS (ref 23–37)
AST SERPL W P-5'-P-CCNC: 27 U/L (ref 13–39)
BASE EXCESS BLDA CALC-SCNC: 7 MMOL/L (ref -2–3)
BASOPHILS # BLD AUTO: 0.09 THOUSANDS/ΜL (ref 0–0.1)
BASOPHILS NFR BLD AUTO: 1 % (ref 0–1)
BILIRUB SERPL-MCNC: 1.06 MG/DL (ref 0.2–1)
BUN SERPL-MCNC: 17 MG/DL (ref 5–25)
CA-I BLD-SCNC: 1.18 MMOL/L (ref 1.12–1.32)
CALCIUM ALBUM COR SERPL-MCNC: 10 MG/DL (ref 8.3–10.1)
CALCIUM SERPL-MCNC: 9.3 MG/DL (ref 8.4–10.2)
CFFMA (FUNCTIONAL FIBRINOGEN MAX AMPLITUDE): 25.8 MM (ref 15–32)
CHLORIDE SERPL-SCNC: 106 MMOL/L (ref 96–108)
CKR(REACTION TIME): 6.2 MIN (ref 4.6–9.1)
CO2 SERPL-SCNC: 29 MMOL/L (ref 21–32)
CREAT SERPL-MCNC: 0.73 MG/DL (ref 0.6–1.3)
CRTMA(RAPIDTEG MAX AMPLITUDE): 65.9 MM (ref 52–70)
EOSINOPHIL # BLD AUTO: 0.3 THOUSAND/ΜL (ref 0–0.61)
EOSINOPHIL NFR BLD AUTO: 4 % (ref 0–6)
ERYTHROCYTE [DISTWIDTH] IN BLOOD BY AUTOMATED COUNT: 16.2 % (ref 11.6–15.1)
GFR SERPL CREATININE-BSD FRML MDRD: 78 ML/MIN/1.73SQ M
GLUCOSE SERPL-MCNC: 159 MG/DL (ref 65–140)
GLUCOSE SERPL-MCNC: 161 MG/DL (ref 65–140)
HCO3 BLDA-SCNC: 31.1 MMOL/L (ref 24–30)
HCT VFR BLD AUTO: 45.6 % (ref 34.8–46.1)
HCT VFR BLD CALC: 44 % (ref 34.8–46.1)
HGB BLD-MCNC: 14.3 G/DL (ref 11.5–15.4)
HGB BLDA-MCNC: 15 G/DL (ref 11.5–15.4)
IMM GRANULOCYTES # BLD AUTO: 0.09 THOUSAND/UL (ref 0–0.2)
IMM GRANULOCYTES NFR BLD AUTO: 1 % (ref 0–2)
INR PPP: 1.11 (ref 0.84–1.19)
LYMPHOCYTES # BLD AUTO: 2.51 THOUSANDS/ΜL (ref 0.6–4.47)
LYMPHOCYTES NFR BLD AUTO: 34 % (ref 14–44)
MCH RBC QN AUTO: 30 PG (ref 26.8–34.3)
MCHC RBC AUTO-ENTMCNC: 31.4 G/DL (ref 31.4–37.4)
MCV RBC AUTO: 96 FL (ref 82–98)
MONOCYTES # BLD AUTO: 0.75 THOUSAND/ΜL (ref 0.17–1.22)
MONOCYTES NFR BLD AUTO: 10 % (ref 4–12)
NEUTROPHILS # BLD AUTO: 3.6 THOUSANDS/ΜL (ref 1.85–7.62)
NEUTS SEG NFR BLD AUTO: 50 % (ref 43–75)
NRBC BLD AUTO-RTO: 0 /100 WBCS
PCO2 BLD: 32 MMOL/L (ref 21–32)
PCO2 BLD: 42.9 MM HG (ref 42–50)
PH BLD: 7.47 [PH] (ref 7.3–7.4)
PLATELET # BLD AUTO: 247 THOUSANDS/UL (ref 149–390)
PMV BLD AUTO: 10.9 FL (ref 8.9–12.7)
PO2 BLD: 30 MM HG (ref 35–45)
POTASSIUM BLD-SCNC: 4.6 MMOL/L (ref 3.5–5.3)
POTASSIUM SERPL-SCNC: 4.8 MMOL/L (ref 3.5–5.3)
PROT SERPL-MCNC: 6.5 G/DL (ref 6.4–8.4)
PROTHROMBIN TIME: 14.3 SECONDS (ref 11.6–14.5)
RBC # BLD AUTO: 4.76 MILLION/UL (ref 3.81–5.12)
SAO2 % BLD FROM PO2: 61 % (ref 60–85)
SODIUM BLD-SCNC: 141 MMOL/L (ref 136–145)
SODIUM SERPL-SCNC: 140 MMOL/L (ref 135–147)
SPECIMEN SOURCE: ABNORMAL
WBC # BLD AUTO: 7.34 THOUSAND/UL (ref 4.31–10.16)

## 2022-07-28 PROCEDURE — 82330 ASSAY OF CALCIUM: CPT

## 2022-07-28 PROCEDURE — 85730 THROMBOPLASTIN TIME PARTIAL: CPT | Performed by: SURGERY

## 2022-07-28 PROCEDURE — 85576 BLOOD PLATELET AGGREGATION: CPT | Performed by: SURGERY

## 2022-07-28 PROCEDURE — 84132 ASSAY OF SERUM POTASSIUM: CPT

## 2022-07-28 PROCEDURE — NC001 PR NO CHARGE: Performed by: EMERGENCY MEDICINE

## 2022-07-28 PROCEDURE — 71045 X-RAY EXAM CHEST 1 VIEW: CPT

## 2022-07-28 PROCEDURE — 85397 CLOTTING FUNCT ACTIVITY: CPT | Performed by: SURGERY

## 2022-07-28 PROCEDURE — 82803 BLOOD GASES ANY COMBINATION: CPT

## 2022-07-28 PROCEDURE — 72170 X-RAY EXAM OF PELVIS: CPT

## 2022-07-28 PROCEDURE — 84295 ASSAY OF SERUM SODIUM: CPT

## 2022-07-28 PROCEDURE — 70450 CT HEAD/BRAIN W/O DYE: CPT

## 2022-07-28 PROCEDURE — 85384 FIBRINOGEN ACTIVITY: CPT | Performed by: SURGERY

## 2022-07-28 PROCEDURE — 99285 EMERGENCY DEPT VISIT HI MDM: CPT

## 2022-07-28 PROCEDURE — 85025 COMPLETE CBC W/AUTO DIFF WBC: CPT | Performed by: SURGERY

## 2022-07-28 PROCEDURE — 85014 HEMATOCRIT: CPT

## 2022-07-28 PROCEDURE — 85347 COAGULATION TIME ACTIVATED: CPT | Performed by: SURGERY

## 2022-07-28 PROCEDURE — 71260 CT THORAX DX C+: CPT

## 2022-07-28 PROCEDURE — 72125 CT NECK SPINE W/O DYE: CPT

## 2022-07-28 PROCEDURE — 99284 EMERGENCY DEPT VISIT MOD MDM: CPT | Performed by: SURGERY

## 2022-07-28 PROCEDURE — 73564 X-RAY EXAM KNEE 4 OR MORE: CPT

## 2022-07-28 PROCEDURE — 96372 THER/PROPH/DIAG INJ SC/IM: CPT

## 2022-07-28 PROCEDURE — 80053 COMPREHEN METABOLIC PANEL: CPT | Performed by: SURGERY

## 2022-07-28 PROCEDURE — 36415 COLL VENOUS BLD VENIPUNCTURE: CPT | Performed by: SURGERY

## 2022-07-28 PROCEDURE — 85610 PROTHROMBIN TIME: CPT | Performed by: SURGERY

## 2022-07-28 PROCEDURE — 82947 ASSAY GLUCOSE BLOOD QUANT: CPT

## 2022-07-28 PROCEDURE — 74177 CT ABD & PELVIS W/CONTRAST: CPT

## 2022-07-28 PROCEDURE — 73610 X-RAY EXAM OF ANKLE: CPT

## 2022-07-28 RX ORDER — OLANZAPINE 10 MG/1
5 INJECTION, POWDER, LYOPHILIZED, FOR SOLUTION INTRAMUSCULAR ONCE
Status: COMPLETED | OUTPATIENT
Start: 2022-07-28 | End: 2022-07-28

## 2022-07-28 RX ORDER — LISINOPRIL 10 MG/1
10 TABLET ORAL DAILY
COMMUNITY

## 2022-07-28 RX ORDER — METOPROLOL TARTRATE 50 MG/1
50 TABLET, FILM COATED ORAL DAILY
COMMUNITY

## 2022-07-28 RX ORDER — MELATONIN
1000 DAILY
COMMUNITY

## 2022-07-28 RX ORDER — OMEPRAZOLE 20 MG/1
20 CAPSULE, DELAYED RELEASE ORAL DAILY
COMMUNITY

## 2022-07-28 RX ORDER — WATER 1000 ML/1000ML
INJECTION, SOLUTION INTRAVENOUS
Status: COMPLETED
Start: 2022-07-28 | End: 2022-07-28

## 2022-07-28 RX ADMIN — IOHEXOL 70 ML: 350 INJECTION, SOLUTION INTRAVENOUS at 15:45

## 2022-07-28 RX ADMIN — OLANZAPINE 5 MG: 10 INJECTION, POWDER, FOR SOLUTION INTRAMUSCULAR at 17:54

## 2022-07-28 RX ADMIN — WATER 2.1 ML: 1 INJECTION INTRAMUSCULAR; INTRAVENOUS; SUBCUTANEOUS at 17:54

## 2022-07-28 NOTE — H&P
H&P - Trauma   Nadya Martinez 80 y o  female MRN: [id number]  Unit/Bed#: [facility] Encounter: [id number]    Trauma Alert: Level B   Model of Arrival: Ambulance    Trauma Team: Attending To and  [address]  Consultants:     None     Assessment/Plan   Active Problems / Assessment:   Fall- CT scans negative  Awaiting L knee/ankle XR     Plan:   If Leg films negative then assess ambulation and discharge if pain is adequately controlled and gait is stable  History of Present Illness     Chief Complaint: Fall   Mechanism:Fall     HPI:    Nadya Martinez is a 80 y o  female who presents with mechanical Fall while ambulating at nursing home after tripping on a sofa while walking  +Headstrike, -LOC  Takes DOAC  Review of Systems   Constitutional: Negative for chills and fever  Respiratory: Negative for chest tightness and shortness of breath  Cardiovascular: Negative for chest pain  Gastrointestinal: Negative for abdominal distention and abdominal pain  Genitourinary: Negative for difficulty urinating  Musculoskeletal: Positive for arthralgias, joint swelling and myalgias  Neurological: Negative for dizziness, weakness and headaches  Psychiatric/Behavioral: Positive for confusion  12-point, complete review of systems was reviewed and negative except as stated above  Historical Information     No past medical history on file  No past surgical history on file  There is no immunization history on file for this patient  Last Tetanus: Unknown  Family History: Non-contributory         Meds/Allergies   current meds:   No current facility-administered medications for this encounter  and PTA meds:   Prior to Admission Medications   Prescriptions Last Dose Informant Patient Reported? Taking?    apixaban (ELIQUIS) 5 mg 7/28/2022 at Unknown time  Yes Yes   Sig: Take 5 mg by mouth 2 (two) times a day   cholecalciferol (VITAMIN D3) 1,000 units tablet 7/28/2022 at Unknown time  Yes Yes   Sig: Take 1,000 Units by mouth daily   lisinopril (ZESTRIL) 10 mg tablet 2022 at Unknown time  Yes Yes   Sig: Take 10 mg by mouth daily   metoprolol tartrate (LOPRESSOR) 50 mg tablet 2022 at Unknown time  Yes Yes   Sig: Take 50 mg by mouth in the morning   omeprazole (PriLOSEC) 20 mg delayed release capsule 2022 at Unknown time  Yes Yes   Sig: Take 20 mg by mouth daily      Facility-Administered Medications: None     Allergies have not been reviewed; Not on File    Objective   Initial Vitals:   Temperature: (!) 97 2 °F (36 2 °C) (22 1520)  Pulse: 65 (22 1520)  Respirations: 18 (22 1520)  Blood Pressure: (!) 177/70 (22 1520)    Primary Survey:   Airway:        Status: patent;        Pre-hospital Interventions: none        Hospital Interventions: none  Breathing:        Pre-hospital Interventions: none       Effort: normal       Right breath sounds: normal       Left breath sounds: normal  Circulation:        Rhythm: regular       Rate: regular   Right Pulses Left Pulses    R radial: 2+    R pedal: 2+     L radial: 2+    L pedal: 2+       Disability:        GCS:        Right Pupil:       Left Pupil:     R Motor Strength L Motor Strength               Exposure:           Secondary Survey:  Physical Exam  Constitutional:       Appearance: She is not ill-appearing  HENT:      Head: Normocephalic and atraumatic  Cardiovascular:      Rate and Rhythm: Normal rate and regular rhythm  Pulses:           Radial pulses are 2+ on the right side and 2+ on the left side  Dorsalis pedis pulses are 2+ on the right side and 2+ on the left side  Heart sounds: Normal heart sounds  Pulmonary:      Effort: Pulmonary effort is normal       Breath sounds: Normal breath sounds  Abdominal:      General: Abdomen is flat  There is no distension  Palpations: Abdomen is soft  Musculoskeletal:      Cervical back: Full passive range of motion without pain        Right lower le+ Edema present  Left lower le+ Edema present  Skin:     General: Skin is warm and dry  Neurological:      Mental Status: She is alert  Psychiatric:         Behavior: Behavior is cooperative  Invasive Devices  Report    None               Lab Results:   BMP/CMP:   Lab Results   Component Value Date    SODIUM 140 2022    K 4 8 2022     2022    CO2 29 2022    CO2 32 2022    BUN 17 2022    CREATININE 0 73 2022    GLUCOSE 159 (H) 2022    CALCIUM 9 3 2022    AST 27 2022    ALT 12 2022    ALKPHOS 110 (H) 2022    EGFR 78 2022   , CBC:   Lab Results   Component Value Date    WBC 7 34 2022    HGB 14 3 2022    HCT 45 6 2022    MCV 96 2022     2022    MCH 30 0 2022    MCHC 31 4 2022    RDW 16 2 (H) 2022    MPV 10 9 2022    NRBC 0 2022   , Coagulation:   Lab Results   Component Value Date    INR 1 11 2022    and ISTAT: No components found for: VBG    Imaging Results: I have personally reviewed pertinent reports  Chest Xray(s): negative for acute findings   FAST exam(s): negative for acute findings   CT Scan(s): negative for acute findings   Additional Xray(s): negative for acute findings     Other Studies: n/a    Code Status: No Order  Advance Directive and Living Will:      Power of :    POLST:    I have spent 30 minutes with Patient  today in which greater than 50% of this time was spent in counseling/coordination of care regarding Diagnostic results, Risks and benefits of tx options and Impressions

## 2022-07-28 NOTE — ED PROVIDER NOTES
Emergency Department Airway Evaluation and Management Form    History  Obtained from: patient, EMS  Patient has no allergy information on record  No chief complaint on file  HPI     Patient with suspected mechanical fall, head strike on Eliquis, rice as pre-hospital trauma level B activation due to fall, head strike, Eliquis use  Reportedly at her baseline per EMS  Patient describes pain in her left arm and lower extremity  No past medical history on file  No past surgical history on file  No family history on file  I have reviewed and agree with the history as documented  Review of Systems     Per trauma    Physical Exam  /76   Pulse 61   Temp (!) 97 2 °F (36 2 °C) (Oral)   Resp 18   Wt 117 kg (257 lb 15 oz)   SpO2 96%     Physical Exam    Per trauma    ED Medications  Medications - No data to display    Intubation  Procedures    Notes  Airway intact, equal, bilateral breath sounds  Trauma team at bedside for trauma level B activation    Additional workup, documentation, disposition per trauma team     Final Diagnosis  Final diagnoses:   None       ED Provider  Electronically Signed by     Lucille Alfred MD  07/28/22 5405

## 2022-07-28 NOTE — CASE MANAGEMENT
CM responded to trauma alert  Patient transported to trauma bay by [facility]  Patient reportedly from [facility] and has dementia  Patient responsive to medical team  Cm attempted to contact patient's son Jonathan Platt, [phone], [phone] and [phone]  Cm was able to leave voice message for Jonathan Lc, but spoke with Gilles Schroeder to inform him  He stated that [facility] told him but that they were going to try and call Jonathanmichael Platt  Cm informed Gilles Schroeder that she left Jonathan Lc a message also  Cm informed Trauma Ap about contact information  No current identified CM needs  CM will follow and update screening, assessment, and discharge planning as appropriate

## 2022-07-29 NOTE — ED NOTES
Transport information:  [facility]  at 2300      Primary nurse updated     Alirio Clifford RN  07/28/22 9821

## 2022-08-09 ENCOUNTER — IN HOME VISIT (OUTPATIENT)
Dept: GERIATRICS | Facility: OTHER | Age: 82
End: 2022-08-09
Payer: MEDICARE

## 2022-08-09 VITALS
SYSTOLIC BLOOD PRESSURE: 110 MMHG | DIASTOLIC BLOOD PRESSURE: 68 MMHG | TEMPERATURE: 97.2 F | HEART RATE: 88 BPM | OXYGEN SATURATION: 94 % | RESPIRATION RATE: 18 BRPM

## 2022-08-09 DIAGNOSIS — G30.1 LATE ONSET ALZHEIMER'S DEMENTIA WITH BEHAVIORAL DISTURBANCE (HCC): ICD-10-CM

## 2022-08-09 DIAGNOSIS — I48.19 PERSISTENT ATRIAL FIBRILLATION (HCC): ICD-10-CM

## 2022-08-09 DIAGNOSIS — I10 ESSENTIAL HYPERTENSION: Chronic | ICD-10-CM

## 2022-08-09 DIAGNOSIS — K21.9 GASTROESOPHAGEAL REFLUX DISEASE WITHOUT ESOPHAGITIS: Chronic | ICD-10-CM

## 2022-08-09 DIAGNOSIS — I50.22 CHRONIC SYSTOLIC CONGESTIVE HEART FAILURE (HCC): ICD-10-CM

## 2022-08-09 DIAGNOSIS — E11.69 TYPE 2 DIABETES MELLITUS WITH OTHER SPECIFIED COMPLICATION, WITHOUT LONG-TERM CURRENT USE OF INSULIN (HCC): ICD-10-CM

## 2022-08-09 DIAGNOSIS — M54.50 ACUTE BILATERAL LOW BACK PAIN WITHOUT SCIATICA: Primary | ICD-10-CM

## 2022-08-09 DIAGNOSIS — F02.818 LATE ONSET ALZHEIMER'S DEMENTIA WITH BEHAVIORAL DISTURBANCE (HCC): ICD-10-CM

## 2022-08-09 PROCEDURE — 99326 PR DOMICIL/REST HOME NEW PT HI-MOD SEVER 45 MINUTES: CPT | Performed by: NURSE PRACTITIONER
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
## 2022-08-16 PROBLEM — F02.818 LATE ONSET ALZHEIMER'S DEMENTIA WITH BEHAVIORAL DISTURBANCE (HCC): Status: ACTIVE | Noted: 2022-08-09

## 2022-08-16 PROBLEM — F02.81 LATE ONSET ALZHEIMER'S DEMENTIA WITH BEHAVIORAL DISTURBANCE (HCC): Status: ACTIVE | Noted: 2022-08-09

## 2022-08-16 PROBLEM — G30.1 LATE ONSET ALZHEIMER'S DEMENTIA WITH BEHAVIORAL DISTURBANCE (HCC): Status: ACTIVE | Noted: 2022-08-09

## 2022-08-16 PROBLEM — M54.50 LOW BACK PAIN: Status: ACTIVE | Noted: 2022-08-09

## 2022-08-16 RX ORDER — LORAZEPAM 0.5 MG/1
0.5 TABLET ORAL 2 TIMES DAILY
COMMUNITY

## 2022-08-16 RX ORDER — FAMOTIDINE 20 MG/1
20 TABLET, FILM COATED ORAL DAILY
COMMUNITY

## 2022-08-16 RX ORDER — NYSTATIN 100000 [USP'U]/G
POWDER TOPICAL 2 TIMES DAILY
COMMUNITY

## 2022-08-16 RX ORDER — ESCITALOPRAM OXALATE 10 MG/1
10 TABLET ORAL DAILY
COMMUNITY

## 2022-08-16 RX ORDER — ATORVASTATIN CALCIUM 10 MG/1
10 TABLET, FILM COATED ORAL DAILY
COMMUNITY

## 2022-08-16 RX ORDER — LIDOCAINE 4 G/G
1 PATCH TOPICAL DAILY
COMMUNITY

## 2022-08-16 RX ORDER — OLANZAPINE 2.5 MG/1
3.75 TABLET ORAL
COMMUNITY

## 2022-08-16 RX ORDER — FUROSEMIDE 20 MG/1
20 TABLET ORAL DAILY
COMMUNITY

## 2022-08-17 NOTE — ASSESSMENT & PLAN NOTE
· Rate controlled, goal HR < 100  · Continue Metoprolol 50 mg daily  · Continue Eliquis 5 mg 2 times daily

## 2022-08-17 NOTE — ASSESSMENT & PLAN NOTE
· Patient euvolemic on examination  · Weight loss noted over the past 2 months  · No sob or increased edema present on examination  · Continue Furosemide 20 mg daily  · Continue metoprolol 50 mg daily  · Will continue to monitor

## 2022-08-17 NOTE — ASSESSMENT & PLAN NOTE
Lab Results   Component Value Date    HGBA1C 7 7 (H) 02/19/2022   ·  on 08/04/2022  · Patient managed off medications  · Continue lifestyle management  · Will monitor A1C

## 2022-08-17 NOTE — ASSESSMENT & PLAN NOTE
· Will order scheduled Tylenol 975 mg TID for pain  · Aspercreme patch ordered for lower back  · PT ordered to evaluate and treat  · Continue NPI as tolerated

## 2022-08-17 NOTE — ASSESSMENT & PLAN NOTE
· Currently stable, occasional behaviors reported  · Continue Lorazepam and Zyprexa at current doses  · Redirect, reorient and reassure  · Continue current level of care on the [facility]  memory unit  · Follow-up with geriatric psychiatry, Dr Ajith Metcalf

## 2022-08-17 NOTE — ASSESSMENT & PLAN NOTE
· BP stable and controlled  · Continue Lisinopril and Metoprolol  · Will monitor electrolytes and renal function

## 2022-08-21 ENCOUNTER — HOSPITAL ENCOUNTER (EMERGENCY)
Facility: HOSPITAL | Age: 82
Discharge: HOME | End: 2022-08-21
Attending: EMERGENCY MEDICINE
Payer: MEDICARE

## 2022-08-21 ENCOUNTER — APPOINTMENT (OUTPATIENT)
Dept: RADIOLOGY | Facility: HOSPITAL | Age: 82
End: 2022-08-21
Payer: MEDICARE

## 2022-08-21 ENCOUNTER — APPOINTMENT (EMERGENCY)
Dept: CT IMAGING | Facility: HOSPITAL | Age: 82
End: 2022-08-21
Payer: MEDICARE

## 2022-08-21 ENCOUNTER — TELEPHONE (OUTPATIENT)
Dept: OTHER | Facility: OTHER | Age: 82
End: 2022-08-21

## 2022-08-21 VITALS
TEMPERATURE: 98.2 F | BODY MASS INDEX: 35.6 KG/M2 | SYSTOLIC BLOOD PRESSURE: 173 MMHG | DIASTOLIC BLOOD PRESSURE: 98 MMHG | RESPIRATION RATE: 18 BRPM | HEART RATE: 65 BPM | WEIGHT: 227.29 LBS | OXYGEN SATURATION: 95 %
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
## 2022-08-21 LAB
ANION GAP SERPL CALCULATED.3IONS-SCNC: 4 MMOL/L (ref 4–13)
BASE EXCESS BLDA CALC-SCNC: 6 MMOL/L (ref -2–3)
BASOPHILS # BLD AUTO: 0.06 THOUSANDS/ΜL (ref 0–0.1)
BASOPHILS NFR BLD AUTO: 1 % (ref 0–1)
BUN SERPL-MCNC: 15 MG/DL (ref 5–25)
CA-I BLD-SCNC: 1.22 MMOL/L (ref 1.12–1.32)
CALCIUM SERPL-MCNC: 9.6 MG/DL (ref 8.4–10.2)
CHLORIDE SERPL-SCNC: 106 MMOL/L (ref 96–108)
CO2 SERPL-SCNC: 29 MMOL/L (ref 21–32)
CREAT SERPL-MCNC: 0.72 MG/DL (ref 0.6–1.3)
EOSINOPHIL # BLD AUTO: 0.41 THOUSAND/ΜL (ref 0–0.61)
EOSINOPHIL NFR BLD AUTO: 6 % (ref 0–6)
ERYTHROCYTE [DISTWIDTH] IN BLOOD BY AUTOMATED COUNT: 16.7 % (ref 11.6–15.1)
GFR SERPL CREATININE-BSD FRML MDRD: 79 ML/MIN/1.73SQ M
GLUCOSE SERPL-MCNC: 113 MG/DL (ref 65–140)
GLUCOSE SERPL-MCNC: 130 MG/DL (ref 65–140)
HCO3 BLDA-SCNC: 32 MMOL/L (ref 24–30)
HCT VFR BLD AUTO: 46.5 % (ref 34.8–46.1)
HCT VFR BLD CALC: 46 % (ref 34.8–46.1)
HGB BLD-MCNC: 14.5 G/DL (ref 11.5–15.4)
HGB BLDA-MCNC: 15.6 G/DL (ref 11.5–15.4)
IMM GRANULOCYTES # BLD AUTO: 0.02 THOUSAND/UL (ref 0–0.2)
IMM GRANULOCYTES NFR BLD AUTO: 0 % (ref 0–2)
LYMPHOCYTES # BLD AUTO: 2.13 THOUSANDS/ΜL (ref 0.6–4.47)
LYMPHOCYTES NFR BLD AUTO: 33 % (ref 14–44)
MCH RBC QN AUTO: 30.1 PG (ref 26.8–34.3)
MCHC RBC AUTO-ENTMCNC: 31.2 G/DL (ref 31.4–37.4)
MCV RBC AUTO: 97 FL (ref 82–98)
MONOCYTES # BLD AUTO: 0.82 THOUSAND/ΜL (ref 0.17–1.22)
MONOCYTES NFR BLD AUTO: 13 % (ref 4–12)
NEUTROPHILS # BLD AUTO: 3.02 THOUSANDS/ΜL (ref 1.85–7.62)
NEUTS SEG NFR BLD AUTO: 47 % (ref 43–75)
NRBC BLD AUTO-RTO: 0 /100 WBCS
PCO2 BLD: 33 MMOL/L (ref 21–32)
PCO2 BLD: 49.3 MM HG (ref 42–50)
PH BLD: 7.42 [PH] (ref 7.3–7.4)
PLATELET # BLD AUTO: 300 THOUSANDS/UL (ref 149–390)
PMV BLD AUTO: 11.4 FL (ref 8.9–12.7)
PO2 BLD: 29 MM HG (ref 35–45)
POTASSIUM BLD-SCNC: 6.6 MMOL/L (ref 3.5–5.3)
POTASSIUM SERPL-SCNC: 4 MMOL/L (ref 3.5–5.3)
RBC # BLD AUTO: 4.81 MILLION/UL (ref 3.81–5.12)
SAO2 % BLD FROM PO2: 54 % (ref 60–85)
SODIUM BLD-SCNC: 139 MMOL/L (ref 136–145)
SODIUM SERPL-SCNC: 139 MMOL/L (ref 135–147)
SPECIMEN SOURCE: ABNORMAL
WBC # BLD AUTO: 6.46 THOUSAND/UL (ref 4.31–10.16)

## 2022-08-21 PROCEDURE — 71250 CT THORAX DX C-: CPT

## 2022-08-21 PROCEDURE — 82947 ASSAY GLUCOSE BLOOD QUANT: CPT

## 2022-08-21 PROCEDURE — NC001 PR NO CHARGE: Performed by: SURGERY

## 2022-08-21 PROCEDURE — 99285 EMERGENCY DEPT VISIT HI MDM: CPT

## 2022-08-21 PROCEDURE — 74176 CT ABD & PELVIS W/O CONTRAST: CPT

## 2022-08-21 PROCEDURE — 84132 ASSAY OF SERUM POTASSIUM: CPT

## 2022-08-21 PROCEDURE — 99284 EMERGENCY DEPT VISIT MOD MDM: CPT | Performed by: SURGERY

## 2022-08-21 PROCEDURE — 84295 ASSAY OF SERUM SODIUM: CPT

## 2022-08-21 PROCEDURE — 96372 THER/PROPH/DIAG INJ SC/IM: CPT

## 2022-08-21 PROCEDURE — 36415 COLL VENOUS BLD VENIPUNCTURE: CPT | Performed by: NURSE PRACTITIONER

## 2022-08-21 PROCEDURE — 80048 BASIC METABOLIC PNL TOTAL CA: CPT | Performed by: NURSE PRACTITIONER

## 2022-08-21 PROCEDURE — 72125 CT NECK SPINE W/O DYE: CPT

## 2022-08-21 PROCEDURE — 96374 THER/PROPH/DIAG INJ IV PUSH: CPT

## 2022-08-21 PROCEDURE — 85025 COMPLETE CBC W/AUTO DIFF WBC: CPT | Performed by: NURSE PRACTITIONER

## 2022-08-21 PROCEDURE — 82330 ASSAY OF CALCIUM: CPT

## 2022-08-21 PROCEDURE — 71045 X-RAY EXAM CHEST 1 VIEW: CPT

## 2022-08-21 PROCEDURE — 70450 CT HEAD/BRAIN W/O DYE: CPT

## 2022-08-21 PROCEDURE — 73560 X-RAY EXAM OF KNEE 1 OR 2: CPT

## 2022-08-21 PROCEDURE — 82803 BLOOD GASES ANY COMBINATION: CPT

## 2022-08-21 PROCEDURE — 85014 HEMATOCRIT: CPT

## 2022-08-21 RX ORDER — LORAZEPAM 2 MG/ML
0.5 INJECTION INTRAMUSCULAR ONCE
Status: COMPLETED | OUTPATIENT
Start: 2022-08-21 | End: 2022-08-21

## 2022-08-21 RX ORDER — WATER 1000 ML/1000ML
INJECTION, SOLUTION INTRAVENOUS
Status: COMPLETED
Start: 2022-08-21 | End: 2022-08-21

## 2022-08-21 RX ORDER — OLANZAPINE 10 MG/1
5 INJECTION, POWDER, LYOPHILIZED, FOR SOLUTION INTRAMUSCULAR ONCE
Status: COMPLETED | OUTPATIENT
Start: 2022-08-21 | End: 2022-08-21

## 2022-08-21 RX ADMIN — WATER 1 ML: 1 INJECTION INTRAMUSCULAR; INTRAVENOUS; SUBCUTANEOUS at 11:57

## 2022-08-21 RX ADMIN — OLANZAPINE 5 MG: 10 INJECTION, POWDER, FOR SOLUTION INTRAMUSCULAR at 11:57

## 2022-08-21 RX ADMIN — LORAZEPAM 0.5 MG: 2 INJECTION INTRAMUSCULAR; INTRAVENOUS at 15:00

## 2022-08-21 NOTE — ED NOTES
Patient assisted to bedpan on arrival  Provider at bedside for evaluation  Patient upgraded to trauma B  Transported to [place]       Merna Manning RN  08/21/22 [id number]

## 2022-08-21 NOTE — PROCEDURES
POC FAST US    Date/Time: 8/21/2022 9:06 AM  Performed by: MOISES Tyler  Authorized by: Ed Tyler     Patient location:  Trauma  Procedure details:     Exam Type:  Diagnostic    Indications: blunt abdominal trauma and blunt chest trauma      Assess for:  Intra-abdominal fluid and pericardial effusion    Technique: FAST      Views obtained:  Heart - Pericardial sac, LUQ - Splenorenal space, Suprapubic - Pouch of Neptali and RUQ - Pak's Pouch    Image quality: diagnostic      Image availability:  Images available in PACS and video obtained  FAST Findings:     RUQ (Hepatorenal) free fluid: absent      LUQ (Splenorenal) free fluid: absent      Suprapubic free fluid: absent      Cardiac wall motion: identified      Pericardial effusion: absent    Interpretation:     Impressions: negative
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
## 2022-08-21 NOTE — ASSESSMENT & PLAN NOTE
· Injuries listed below  · CT imaging was negative for acute traumatic injury   · Labs unremarkable  · Passed PO and ambulatory trial    · DC back to facility
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
## 2022-08-21 NOTE — TELEPHONE ENCOUNTER
Laura First from [facility] called to report a resident fell and she has severe back pain they can't get her off the floor  Called the On call provider

## 2022-08-21 NOTE — ED NOTES
Patient remains agitated even after medication  Provider notified of same       Marcin Benavides RN  08/21/22 9601

## 2022-08-21 NOTE — ED NOTES
[facility] called for a pt update, if pt gets discharged please make sure to call them with information regarding her test findings, etc         [facility]  08/21/22 9622

## 2022-08-21 NOTE — ED NOTES
Pt ambulated into [address] with walker, steady gait, pt has no complaints while ambulating     [facility]  08/21/22 4709
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
## 2022-08-22 NOTE — ED NOTES
bloodwork requested by the facility was sent via fax to [phone]       Chelsea Quick RN  08/21/22 2126

## 2022-08-28 ENCOUNTER — TELEPHONE (OUTPATIENT)
Dept: OTHER | Facility: OTHER | Age: 82
End: 2022-08-28

## 2022-08-28 NOTE — TELEPHONE ENCOUNTER
[address]  Family of patient is requesting mobile Xray   Please call with orders    Paged to on call provider via [facility]

## 2022-09-05 ENCOUNTER — TELEPHONE (OUTPATIENT)
Dept: OTHER | Facility: OTHER | Age: 82
End: 2022-09-05

## 2022-09-13 ENCOUNTER — TELEPHONE (OUTPATIENT)
Dept: OTHER | Facility: OTHER | Age: 82
End: 2022-09-13

## 2022-09-13 PROCEDURE — 99205 OFFICE O/P NEW HI 60 MIN: CPT | Performed by: SURGERY

## 2022-09-13 PROCEDURE — 99215 OFFICE O/P EST HI 40 MIN: CPT | Performed by: SURGERY

## 2022-09-14 ENCOUNTER — APPOINTMENT (OUTPATIENT)
Dept: RADIOLOGY | Facility: HOSPITAL | Age: 82
DRG: 552 | End: 2022-09-14
Payer: MEDICARE

## 2022-09-14 ENCOUNTER — HOSPITAL ENCOUNTER (INPATIENT)
Facility: HOSPITAL | Age: 82
LOS: 2 days | Discharge: HOME | DRG: 552 | End: 2022-09-16
Attending: SURGERY | Admitting: SURGERY
Payer: MEDICARE

## 2022-09-14 ENCOUNTER — APPOINTMENT (EMERGENCY)
Dept: CT IMAGING | Facility: HOSPITAL | Age: 82
DRG: 552 | End: 2022-09-14
Payer: MEDICARE

## 2022-09-14 DIAGNOSIS — S32.020A CLOSED COMPRESSION FRACTURE OF L2 LUMBAR VERTEBRA, INITIAL ENCOUNTER (HCC): ICD-10-CM
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
## 2022-09-14 DIAGNOSIS — S32.028A OTHER CLOSED FRACTURE OF SECOND LUMBAR VERTEBRA, INITIAL ENCOUNTER (HCC): ICD-10-CM
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
## 2022-09-14 LAB
ANION GAP SERPL CALCULATED.3IONS-SCNC: 5 MMOL/L (ref 4–13)
ANION GAP SERPL CALCULATED.3IONS-SCNC: 5 MMOL/L (ref 4–13)
ANION GAP SERPL CALCULATED.3IONS-SCNC: 6 MMOL/L (ref 4–13)
ANION GAP SERPL CALCULATED.3IONS-SCNC: 6 MMOL/L (ref 4–13)
ATRIAL RATE: 166 BPM
ATRIAL RATE: 166 BPM
BASE EXCESS BLDA CALC-SCNC: 4 MMOL/L (ref -2–3)
BASE EXCESS BLDA CALC-SCNC: 4 MMOL/L (ref -2–3)
BASOPHILS # BLD AUTO: 0.05 THOUSANDS/ΜL (ref 0–0.1)
BASOPHILS # BLD AUTO: 0.05 THOUSANDS/ΜL (ref 0–0.1)
BASOPHILS # BLD AUTO: 0.06 THOUSANDS/ΜL (ref 0–0.1)
BASOPHILS # BLD AUTO: 0.06 THOUSANDS/ΜL (ref 0–0.1)
BASOPHILS NFR BLD AUTO: 1 % (ref 0–1)
BUN SERPL-MCNC: 12 MG/DL (ref 5–25)
BUN SERPL-MCNC: 12 MG/DL (ref 5–25)
BUN SERPL-MCNC: 17 MG/DL (ref 5–25)
BUN SERPL-MCNC: 17 MG/DL (ref 5–25)
CA-I BLD-SCNC: 1.1 MMOL/L (ref 1.12–1.32)
CA-I BLD-SCNC: 1.1 MMOL/L (ref 1.12–1.32)
CALCIUM SERPL-MCNC: 10 MG/DL (ref 8.4–10.2)
CALCIUM SERPL-MCNC: 10 MG/DL (ref 8.4–10.2)
CALCIUM SERPL-MCNC: 9.7 MG/DL (ref 8.4–10.2)
CALCIUM SERPL-MCNC: 9.7 MG/DL (ref 8.4–10.2)
CHLORIDE SERPL-SCNC: 105 MMOL/L (ref 96–108)
CO2 SERPL-SCNC: 26 MMOL/L (ref 21–32)
CO2 SERPL-SCNC: 26 MMOL/L (ref 21–32)
CO2 SERPL-SCNC: 27 MMOL/L (ref 21–32)
CO2 SERPL-SCNC: 27 MMOL/L (ref 21–32)
CREAT SERPL-MCNC: 0.61 MG/DL (ref 0.6–1.3)
CREAT SERPL-MCNC: 0.61 MG/DL (ref 0.6–1.3)
CREAT SERPL-MCNC: 0.67 MG/DL (ref 0.6–1.3)
CREAT SERPL-MCNC: 0.67 MG/DL (ref 0.6–1.3)
EOSINOPHIL # BLD AUTO: 0.18 THOUSAND/ΜL (ref 0–0.61)
EOSINOPHIL # BLD AUTO: 0.18 THOUSAND/ΜL (ref 0–0.61)
EOSINOPHIL # BLD AUTO: 0.22 THOUSAND/ΜL (ref 0–0.61)
EOSINOPHIL # BLD AUTO: 0.22 THOUSAND/ΜL (ref 0–0.61)
EOSINOPHIL NFR BLD AUTO: 3 % (ref 0–6)
ERYTHROCYTE [DISTWIDTH] IN BLOOD BY AUTOMATED COUNT: 15.9 % (ref 11.6–15.1)
ERYTHROCYTE [DISTWIDTH] IN BLOOD BY AUTOMATED COUNT: 15.9 % (ref 11.6–15.1)
ERYTHROCYTE [DISTWIDTH] IN BLOOD BY AUTOMATED COUNT: 16.1 % (ref 11.6–15.1)
ERYTHROCYTE [DISTWIDTH] IN BLOOD BY AUTOMATED COUNT: 16.1 % (ref 11.6–15.1)
GFR SERPL CREATININE-BSD FRML MDRD: 83 ML/MIN/1.73SQ M
GFR SERPL CREATININE-BSD FRML MDRD: 83 ML/MIN/1.73SQ M
GFR SERPL CREATININE-BSD FRML MDRD: 85 ML/MIN/1.73SQ M
GFR SERPL CREATININE-BSD FRML MDRD: 85 ML/MIN/1.73SQ M
GLUCOSE SERPL-MCNC: 131 MG/DL (ref 65–140)
GLUCOSE SERPL-MCNC: 131 MG/DL (ref 65–140)
GLUCOSE SERPL-MCNC: 140 MG/DL (ref 65–140)
GLUCOSE SERPL-MCNC: 140 MG/DL (ref 65–140)
GLUCOSE SERPL-MCNC: 145 MG/DL (ref 65–140)
GLUCOSE SERPL-MCNC: 145 MG/DL (ref 65–140)
HCO3 BLDA-SCNC: 24.1 MMOL/L (ref 24–30)
HCO3 BLDA-SCNC: 24.1 MMOL/L (ref 24–30)
HCT VFR BLD AUTO: 44.7 % (ref 34.8–46.1)
HCT VFR BLD AUTO: 44.7 % (ref 34.8–46.1)
HCT VFR BLD AUTO: 45 % (ref 34.8–46.1)
HCT VFR BLD AUTO: 45 % (ref 34.8–46.1)
HCT VFR BLD CALC: 44 % (ref 34.8–46.1)
HCT VFR BLD CALC: 44 % (ref 34.8–46.1)
HGB BLD-MCNC: 14.3 G/DL (ref 11.5–15.4)
HGB BLDA-MCNC: 15 G/DL (ref 11.5–15.4)
HGB BLDA-MCNC: 15 G/DL (ref 11.5–15.4)
IMM GRANULOCYTES # BLD AUTO: 0.03 THOUSAND/UL (ref 0–0.2)
IMM GRANULOCYTES # BLD AUTO: 0.03 THOUSAND/UL (ref 0–0.2)
IMM GRANULOCYTES # BLD AUTO: 0.04 THOUSAND/UL (ref 0–0.2)
IMM GRANULOCYTES # BLD AUTO: 0.04 THOUSAND/UL (ref 0–0.2)
IMM GRANULOCYTES NFR BLD AUTO: 0 % (ref 0–2)
IMM GRANULOCYTES NFR BLD AUTO: 0 % (ref 0–2)
IMM GRANULOCYTES NFR BLD AUTO: 1 % (ref 0–2)
IMM GRANULOCYTES NFR BLD AUTO: 1 % (ref 0–2)
INR PPP: 1.16 (ref 0.84–1.19)
INR PPP: 1.16 (ref 0.84–1.19)
LYMPHOCYTES # BLD AUTO: 1.95 THOUSANDS/ΜL (ref 0.6–4.47)
LYMPHOCYTES # BLD AUTO: 1.95 THOUSANDS/ΜL (ref 0.6–4.47)
LYMPHOCYTES # BLD AUTO: 1.99 THOUSANDS/ΜL (ref 0.6–4.47)
LYMPHOCYTES # BLD AUTO: 1.99 THOUSANDS/ΜL (ref 0.6–4.47)
LYMPHOCYTES NFR BLD AUTO: 23 % (ref 14–44)
LYMPHOCYTES NFR BLD AUTO: 23 % (ref 14–44)
LYMPHOCYTES NFR BLD AUTO: 28 % (ref 14–44)
LYMPHOCYTES NFR BLD AUTO: 28 % (ref 14–44)
MCH RBC QN AUTO: 30.2 PG (ref 26.8–34.3)
MCH RBC QN AUTO: 30.2 PG (ref 26.8–34.3)
MCH RBC QN AUTO: 30.5 PG (ref 26.8–34.3)
MCH RBC QN AUTO: 30.5 PG (ref 26.8–34.3)
MCHC RBC AUTO-ENTMCNC: 31.8 G/DL (ref 31.4–37.4)
MCHC RBC AUTO-ENTMCNC: 31.8 G/DL (ref 31.4–37.4)
MCHC RBC AUTO-ENTMCNC: 32 G/DL (ref 31.4–37.4)
MCHC RBC AUTO-ENTMCNC: 32 G/DL (ref 31.4–37.4)
MCV RBC AUTO: 95 FL (ref 82–98)
MONOCYTES # BLD AUTO: 0.88 THOUSAND/ΜL (ref 0.17–1.22)
MONOCYTES # BLD AUTO: 0.88 THOUSAND/ΜL (ref 0.17–1.22)
MONOCYTES # BLD AUTO: 1 THOUSAND/ΜL (ref 0.17–1.22)
MONOCYTES # BLD AUTO: 1 THOUSAND/ΜL (ref 0.17–1.22)
MONOCYTES NFR BLD AUTO: 12 % (ref 4–12)
NEUTROPHILS # BLD AUTO: 3.93 THOUSANDS/ΜL (ref 1.85–7.62)
NEUTROPHILS # BLD AUTO: 3.93 THOUSANDS/ΜL (ref 1.85–7.62)
NEUTROPHILS # BLD AUTO: 5.16 THOUSANDS/ΜL (ref 1.85–7.62)
NEUTROPHILS # BLD AUTO: 5.16 THOUSANDS/ΜL (ref 1.85–7.62)
NEUTS SEG NFR BLD AUTO: 56 % (ref 43–75)
NEUTS SEG NFR BLD AUTO: 56 % (ref 43–75)
NEUTS SEG NFR BLD AUTO: 60 % (ref 43–75)
NEUTS SEG NFR BLD AUTO: 60 % (ref 43–75)
NRBC BLD AUTO-RTO: 0 /100 WBCS
PCO2 BLD: 24.7 MM HG (ref 42–50)
PCO2 BLD: 24.7 MM HG (ref 42–50)
PCO2 BLD: 25 MMOL/L (ref 21–32)
PCO2 BLD: 25 MMOL/L (ref 21–32)
PH BLD: 7.6 [PH] (ref 7.3–7.4)
PH BLD: 7.6 [PH] (ref 7.3–7.4)
PLATELET # BLD AUTO: 301 THOUSANDS/UL (ref 149–390)
PLATELET # BLD AUTO: 301 THOUSANDS/UL (ref 149–390)
PLATELET # BLD AUTO: 310 THOUSANDS/UL (ref 149–390)
PLATELET # BLD AUTO: 310 THOUSANDS/UL (ref 149–390)
PMV BLD AUTO: 10.2 FL (ref 8.9–12.7)
PMV BLD AUTO: 10.2 FL (ref 8.9–12.7)
PMV BLD AUTO: 10.7 FL (ref 8.9–12.7)
PMV BLD AUTO: 10.7 FL (ref 8.9–12.7)
PO2 BLD: 54 MM HG (ref 35–45)
PO2 BLD: 54 MM HG (ref 35–45)
POTASSIUM BLD-SCNC: 4.1 MMOL/L (ref 3.5–5.3)
POTASSIUM BLD-SCNC: 4.1 MMOL/L (ref 3.5–5.3)
POTASSIUM SERPL-SCNC: 3.5 MMOL/L (ref 3.5–5.3)
POTASSIUM SERPL-SCNC: 3.5 MMOL/L (ref 3.5–5.3)
POTASSIUM SERPL-SCNC: 4 MMOL/L (ref 3.5–5.3)
POTASSIUM SERPL-SCNC: 4 MMOL/L (ref 3.5–5.3)
PROTHROMBIN TIME: 15 SECONDS (ref 11.6–14.5)
PROTHROMBIN TIME: 15 SECONDS (ref 11.6–14.5)
QRS AXIS: 20 DEGREES
QRS AXIS: 20 DEGREES
QRSD INTERVAL: 100 MS
QRSD INTERVAL: 100 MS
QT INTERVAL: 342 MS
QT INTERVAL: 342 MS
QTC INTERVAL: 456 MS
QTC INTERVAL: 456 MS
RBC # BLD AUTO: 4.69 MILLION/UL (ref 3.81–5.12)
RBC # BLD AUTO: 4.69 MILLION/UL (ref 3.81–5.12)
RBC # BLD AUTO: 4.74 MILLION/UL (ref 3.81–5.12)
RBC # BLD AUTO: 4.74 MILLION/UL (ref 3.81–5.12)
SAO2 % BLD FROM PO2: 93 % (ref 60–85)
SAO2 % BLD FROM PO2: 93 % (ref 60–85)
SODIUM BLD-SCNC: 138 MMOL/L (ref 136–145)
SODIUM BLD-SCNC: 138 MMOL/L (ref 136–145)
SODIUM SERPL-SCNC: 137 MMOL/L (ref 135–147)
SPECIMEN SOURCE: ABNORMAL
SPECIMEN SOURCE: ABNORMAL
T WAVE AXIS: 88 DEGREES
T WAVE AXIS: 88 DEGREES
VENTRICULAR RATE: 107 BPM
VENTRICULAR RATE: 107 BPM
WBC # BLD AUTO: 7.07 THOUSAND/UL (ref 4.31–10.16)
WBC # BLD AUTO: 7.07 THOUSAND/UL (ref 4.31–10.16)
WBC # BLD AUTO: 8.42 THOUSAND/UL (ref 4.31–10.16)
WBC # BLD AUTO: 8.42 THOUSAND/UL (ref 4.31–10.16)

## 2022-09-14 PROCEDURE — 80048 BASIC METABOLIC PNL TOTAL CA: CPT | Performed by: SURGERY

## 2022-09-14 PROCEDURE — 74176 CT ABD & PELVIS W/O CONTRAST: CPT

## 2022-09-14 PROCEDURE — 82947 ASSAY GLUCOSE BLOOD QUANT: CPT

## 2022-09-14 PROCEDURE — 84295 ASSAY OF SERUM SODIUM: CPT

## 2022-09-14 PROCEDURE — 36415 COLL VENOUS BLD VENIPUNCTURE: CPT | Performed by: SURGERY

## 2022-09-14 PROCEDURE — 85025 COMPLETE CBC W/AUTO DIFF WBC: CPT | Performed by: SURGERY

## 2022-09-14 PROCEDURE — 71045 X-RAY EXAM CHEST 1 VIEW: CPT

## 2022-09-14 PROCEDURE — NC001 PR NO CHARGE: Performed by: PHYSICIAN ASSISTANT

## 2022-09-14 PROCEDURE — 84132 ASSAY OF SERUM POTASSIUM: CPT

## 2022-09-14 PROCEDURE — NC001 PR NO CHARGE: Performed by: NEUROLOGICAL SURGERY

## 2022-09-14 PROCEDURE — 93005 ELECTROCARDIOGRAM TRACING: CPT

## 2022-09-14 PROCEDURE — 85610 PROTHROMBIN TIME: CPT | Performed by: SURGERY

## 2022-09-14 PROCEDURE — 71250 CT THORAX DX C-: CPT

## 2022-09-14 PROCEDURE — 85014 HEMATOCRIT: CPT

## 2022-09-14 PROCEDURE — 99284 EMERGENCY DEPT VISIT MOD MDM: CPT | Performed by: INTERNAL MEDICINE

## 2022-09-14 PROCEDURE — 85025 COMPLETE CBC W/AUTO DIFF WBC: CPT | Performed by: PHYSICIAN ASSISTANT

## 2022-09-14 PROCEDURE — 97760 ORTHOTIC MGMT&TRAING 1ST ENC: CPT

## 2022-09-14 PROCEDURE — 72125 CT NECK SPINE W/O DYE: CPT

## 2022-09-14 PROCEDURE — 99285 EMERGENCY DEPT VISIT HI MDM: CPT

## 2022-09-14 PROCEDURE — 97167 OT EVAL HIGH COMPLEX 60 MIN: CPT

## 2022-09-14 PROCEDURE — 80048 BASIC METABOLIC PNL TOTAL CA: CPT | Performed by: PHYSICIAN ASSISTANT

## 2022-09-14 PROCEDURE — 82803 BLOOD GASES ANY COMBINATION: CPT

## 2022-09-14 PROCEDURE — 99232 SBSQ HOSP IP/OBS MODERATE 35: CPT | Performed by: PHYSICIAN ASSISTANT

## 2022-09-14 PROCEDURE — 70450 CT HEAD/BRAIN W/O DYE: CPT

## 2022-09-14 PROCEDURE — 93005 ELECTROCARDIOGRAM TRACING: CPT | Performed by: PHYSICIAN ASSISTANT

## 2022-09-14 PROCEDURE — 97163 PT EVAL HIGH COMPLEX 45 MIN: CPT

## 2022-09-14 PROCEDURE — 93010 ELECTROCARDIOGRAM REPORT: CPT | Performed by: INTERNAL MEDICINE

## 2022-09-14 PROCEDURE — 82330 ASSAY OF CALCIUM: CPT

## 2022-09-14 RX ORDER — LIDOCAINE 4 G/G
1 PATCH TOPICAL AS NEEDED
COMMUNITY

## 2022-09-14 RX ORDER — ENOXAPARIN SODIUM 100 MG/ML
30 INJECTION SUBCUTANEOUS EVERY 12 HOURS SCHEDULED
Status: DISCONTINUED | OUTPATIENT
Start: 2022-09-14 | End: 2022-09-15

## 2022-09-14 RX ORDER — HYDROMORPHONE HCL IN WATER/PF 6 MG/30 ML
0.2 PATIENT CONTROLLED ANALGESIA SYRINGE INTRAVENOUS EVERY 4 HOURS PRN
Status: DISCONTINUED | OUTPATIENT
Start: 2022-09-14 | End: 2022-09-15

## 2022-09-14 RX ORDER — LORAZEPAM 0.5 MG/1
TABLET ORAL EVERY 8 HOURS PRN
COMMUNITY
End: 2022-09-16

## 2022-09-14 RX ORDER — OXYCODONE HYDROCHLORIDE 5 MG/1
2.5 TABLET ORAL EVERY 4 HOURS PRN
Status: DISCONTINUED | OUTPATIENT
Start: 2022-09-14 | End: 2022-09-16 | Stop reason: HOSPADM

## 2022-09-14 RX ORDER — ONDANSETRON 2 MG/ML
4 INJECTION INTRAMUSCULAR; INTRAVENOUS EVERY 4 HOURS PRN
Status: DISCONTINUED | OUTPATIENT
Start: 2022-09-14 | End: 2022-09-16 | Stop reason: HOSPADM

## 2022-09-14 RX ORDER — WATER 1000 ML/1000ML
INJECTION, SOLUTION INTRAVENOUS
Status: COMPLETED
Start: 2022-09-14 | End: 2022-09-14

## 2022-09-14 RX ORDER — ESCITALOPRAM OXALATE 10 MG/1
10 TABLET ORAL DAILY
COMMUNITY

## 2022-09-14 RX ORDER — FUROSEMIDE 20 MG/1
20 TABLET ORAL DAILY
COMMUNITY

## 2022-09-14 RX ORDER — OLANZAPINE 10 MG/1
2.5 INJECTION, POWDER, LYOPHILIZED, FOR SOLUTION INTRAMUSCULAR
Status: DISCONTINUED | OUTPATIENT
Start: 2022-09-14 | End: 2022-09-15

## 2022-09-14 RX ORDER — MELATONIN
1000 DAILY
COMMUNITY

## 2022-09-14 RX ORDER — ENOXAPARIN SODIUM 100 MG/ML
30 INJECTION SUBCUTANEOUS DAILY
Status: DISCONTINUED | OUTPATIENT
Start: 2022-09-14 | End: 2022-09-14

## 2022-09-14 RX ORDER — OLANZAPINE 10 MG/1
5 INJECTION, POWDER, LYOPHILIZED, FOR SOLUTION INTRAMUSCULAR ONCE
Status: COMPLETED | OUTPATIENT
Start: 2022-09-14 | End: 2022-09-14

## 2022-09-14 RX ORDER — ACETAMINOPHEN 325 MG/1
975 TABLET ORAL EVERY 8 HOURS SCHEDULED
Status: DISCONTINUED | OUTPATIENT
Start: 2022-09-14 | End: 2022-09-16 | Stop reason: HOSPADM

## 2022-09-14 RX ORDER — METOPROLOL SUCCINATE 50 MG/1
50 TABLET, EXTENDED RELEASE ORAL DAILY
COMMUNITY

## 2022-09-14 RX ORDER — POLYETHYLENE GLYCOL 3350 17 G/17G
17 POWDER, FOR SOLUTION ORAL DAILY
Status: DISCONTINUED | OUTPATIENT
Start: 2022-09-14 | End: 2022-09-16 | Stop reason: HOSPADM

## 2022-09-14 RX ORDER — LORAZEPAM 0.5 MG/1
TABLET ORAL 2 TIMES DAILY
COMMUNITY

## 2022-09-14 RX ORDER — LISINOPRIL 10 MG/1
10 TABLET ORAL DAILY
COMMUNITY

## 2022-09-14 RX ORDER — QUETIAPINE FUMARATE 25 MG/1
25 TABLET, FILM COATED ORAL
Status: DISCONTINUED | OUTPATIENT
Start: 2022-09-14 | End: 2022-09-14

## 2022-09-14 RX ORDER — FAMOTIDINE 20 MG/1
20 TABLET, FILM COATED ORAL DAILY
COMMUNITY

## 2022-09-14 RX ORDER — OLANZAPINE 5 MG/1
2.5 TABLET ORAL DAILY
COMMUNITY

## 2022-09-14 RX ORDER — ATORVASTATIN CALCIUM 10 MG/1
10 TABLET, FILM COATED ORAL DAILY
COMMUNITY

## 2022-09-14 RX ORDER — OXYCODONE HYDROCHLORIDE 5 MG/1
5 TABLET ORAL EVERY 4 HOURS PRN
Status: DISCONTINUED | OUTPATIENT
Start: 2022-09-14 | End: 2022-09-16 | Stop reason: HOSPADM

## 2022-09-14 RX ADMIN — OLANZAPINE 5 MG: 10 INJECTION, POWDER, FOR SOLUTION INTRAMUSCULAR at 01:13

## 2022-09-14 RX ADMIN — WATER 10 ML: 1 INJECTION INTRAMUSCULAR; INTRAVENOUS; SUBCUTANEOUS at 01:14

## 2022-09-14 RX ADMIN — OXYCODONE HYDROCHLORIDE 5 MG: 5 TABLET ORAL at 22:48

## 2022-09-14 RX ADMIN — ACETAMINOPHEN 975 MG: 325 TABLET, FILM COATED ORAL at 21:17

## 2022-09-14 RX ADMIN — ENOXAPARIN SODIUM 30 MG: 30 INJECTION SUBCUTANEOUS at 21:17

## 2022-09-14 RX ADMIN — OXYCODONE HYDROCHLORIDE 5 MG: 5 TABLET ORAL at 05:44

## 2022-09-14 RX ADMIN — OLANZAPINE 5 MG: 10 INJECTION, POWDER, FOR SOLUTION INTRAMUSCULAR at 02:16

## 2022-09-14 RX ADMIN — WATER: 1 INJECTION INTRAMUSCULAR; INTRAVENOUS; SUBCUTANEOUS at 02:30
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
## 2022-09-14 NOTE — PHYSICAL THERAPY NOTE
PHYSICAL THERAPY EVALUATION  NAME: Dona Solo  AGE:   [de-identified] y o  MRN:  [id number]  ADMIT DX: Trauma Roselia Clarke  Fall from standing, initial encounter [W19  XXXA]  Other closed fracture of second lumbar vertebra, initial encounter ([facility] [address] ) [place]    PMH: No past medical history on file  LENGTH OF STAY: 0       09/14/22 1359   PT Last Visit   PT Visit Date 09/14/22   Note Type   Note type Evaluation;Orthotic Management/Training   Type of Brace   Brace Applied Aspen Waskish LSO   Additional Brace Ordered No   Patient Position When Brace Applied Seated  (on EOB)   Bracing Recommendations   (Extension panels bilateral sides)   Education   Education Provided Yes;Family or social support of family present for education by provider; Other (comment)  (handout provided)   End of Consult   Patient Position at End of Consult Bedside chair; All needs within reach;Bed/Chair alarm activated  ((+) posey lap belt while in chair)   Nurse Communication Nurse aware of consult, application of brace   Pain Assessment   Pain Assessment Tool 0-10   Pain Score 8   Pain Location/Orientation Location: Back   Pain Rating: FLACC (Rest) - Face 0   Pain Rating: FLACC (Rest) - Legs 0   Pain Rating: FLACC (Rest) - Activity 0   Pain Rating: FLACC (Rest) - Cry 0   Pain Rating: FLACC (Rest) - Consolability 0   Score: FLACC (Rest) 0   Pain Rating: FLACC (Activity) - Face 1   Pain Rating: FLACC (Activity) - Legs 0   Pain Rating: FLACC (Activity) - Activity 0   Pain Rating: FLACC (Activity) - Cry 1   Pain Rating: FLACC (Activity) - Consolability 0   Score: FLACC (Activity) 2   Restrictions/Precautions   Weight Bearing Precautions Per Order No   Braces or Orthoses (S)  LSO  (confirmed with Jesus Medina order is correct for LSO)   Other Precautions Cognitive; Chair Alarm; Bed Alarm; Restraints; Fall Risk;Pain;Spinal precautions   Home Living   Type of Home Assisted living  [facility])   Home Layout One level   Home Equipment Walker  (rollator)   Additional Comments Ambulates with mod I and rollator at baseline  Prior Function   Lives With Facility staff   Receives Help From Personal care attendant   ADL Assistance Needs assistance   IADLs Needs assistance   Falls in the last 6 months 1 to 4  (3)   General   Family/Caregiver Present Yes   Cognition   Overall Cognitive Status Impaired   Arousal/Participation Cooperative   Attention Attends with cues to redirect   Orientation Level Disoriented to place; Disoriented to time;Disoriented to situation   Memory Decreased short term memory;Decreased recall of recent events   Following Commands Follows one step commands with increased time or repetition   Comments pleasantly confused, information obtained from son   Fatou Saldivar to PT evaluation, "I don't know, I have bandages all over "   RLE Assessment   RLE Assessment X   Strength RLE   RLE Overall Strength 4-/5  (functionally)   LLE Assessment   LLE Assessment X   Strength LLE   LLE Overall Strength 4-/5  (functionally)   Bed Mobility   Supine to Sit 4  Minimal assistance   Additional items Assist x 1;HOB elevated; Bedrails; Increased time required;Verbal cues;LE management   Sit to Supine Unable to assess  (left OOB in chair post session with alarm and posey belt intact)   Transfers   Sit to Stand 4  Minimal assistance   Additional items Assist x 1; Increased time required;Verbal cues   Stand to Sit 4  Minimal assistance   Additional items Assist x 1; Increased time required;Verbal cues   Ambulation/Elevation   Gait pattern Decreased foot clearance; Forward Flexion; Short stride; Excessively slow   Gait Assistance 4  Minimal assist   Additional items Assist x 1;Verbal cues   Assistive Device Rolling walker   Distance 4` bed to chair  (declined further ambulation at this time)   Balance   Static Sitting Good   Dynamic Sitting Fair +   Static Standing Fair -   Dynamic Standing Poor +   Ambulatory Poor +   Endurance Deficit Endurance Deficit Yes   Endurance Deficit Description limited ambulation distance, fatigue   Activity Tolerance   Activity Tolerance Patient limited by fatigue   Nurse Made Aware Per RN, pt appropriate to evaluate   Assessment   Prognosis Fair   Problem List Decreased strength;Decreased endurance; Impaired balance;Decreased mobility; Decreased cognition;Decreased safety awareness; Obesity;Pain   Assessment Pt is a [de-identified] y o  female seen for PT evaluation s/p admit to [facility] on 8/18/2022 w/ UTI (urinary tract infection)  Order placed for PT  Comorbidities affecting pt's physical performance at time of assessment include: obesity and limited cognition  Personal factors affecting pt at time of IE include: advanced age, inability to ambulate household distances and recent fall(s)  Prior to admission, pt was was independent w/ all functional mobility w/ rollator and [facility] in [facility]  Upon evaluation: Pt requires min A for bed mobility, min A for sit to stand, and min A for ambulation with RW  (Please find full objective findings from PT assessment regarding body systems outlined above)  Impairments and limitations also listed above, especially due to  weakness, impaired balance, decreased endurance, gait deviations, pain, decreased activity tolerance, decreased safety awareness, impaired judgement, fall risk and decreased cognition  Pt's clinical presentation is currently unstable/unpredictable seen in pt's presentation of fall risk, current need for posey lap belt, new need for LSO, new spinal precautions, and decreased safety awareness  Pt to benefit from continued skilled PT tx while in hospital and upon DC to address deficits as defined above and maximize level of functional mobility  From PT/mobility standpoint, recommendation at time of d/c would be return to facility with increased assist for mobility vs  inpatient rehab     Recommend progression of ambulation and initiation of HEP as appropriate  Goals   Patient Goals to go home   STG Expiration Date 09/24/22   Short Term Goal #1 Pt will be able to: (1) perform bed mobility with mod I to decrease caregiver burden (2) perform sit to stand with supervision to increase level of independence and promote safe toiletting and transfers (3) ambulate at least 200` with supervision and least restrictive AD to increase activity tolerance and allow for safe community mobilization (4) increase standing balance by 1 grade to decrease risk of falls   PT Treatment Day 0   Plan   Treatment/Interventions Functional transfer training;LE strengthening/ROM; Therapeutic exercise; Endurance training;Cognitive reorientation;Patient/family training;Equipment eval/education; Bed mobility;Gait training   PT Frequency 3-5x/wk   Recommendation   PT Discharge Recommendation Return to facility with rehabilitation services  (vs  rehab pending level of assist available)   Equipment Recommended [address] 435   Turning in Bed Without Bedrails 3   Lying on Back to Sitting on Edge of Flat Bed 3   Moving Bed to Chair 3   Standing Up From Chair 3   Walk in Room 3   Climb 3-5 Stairs 1   Basic Mobility Inpatient Raw Score 16   Basic Mobility Standardized Score 38 32   Highest Level Of Mobility   -[facility] Goal 5: Stand one or more mins   -[facility] Achieved 4: Move to chair/commode   End of Consult   Patient Position at End of Consult Bedside chair;Bed/Chair alarm activated; All needs within reach  ((+) posey belt and LSO)     The patient's AM-PAC Basic Mobility Inpatient Short Form Raw Score is 16, Standardized Score is 38 32  A Raw Score of greater than or equal to 16 suggests the patient may benefit from discharge to home, which MAY or MAY NOT coincide with CURRENT above PT recommendations   However please refer to therapist recommendation for discharge planning given other factors that may influence destination  Adapted from [facility] of -PAC 6-Clicks Basic Mobility and Daily Activity Scores With Discharge Destination  Physical Therapy, 2021;101:1-9   DOI: [email or web address]      Christiano Lynne PT,DPT
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
## 2022-09-14 NOTE — PLAN OF CARE
Problem: PHYSICAL THERAPY ADULT  Goal: Performs mobility at highest level of function for planned discharge setting  See evaluation for individualized goals  Description: Treatment/Interventions: Functional transfer training, LE strengthening/ROM, Therapeutic exercise, Endurance training, Cognitive reorientation, Patient/family training, Equipment eval/education, Bed mobility, Gait training  Equipment Recommended: Rodgers Cranker       See flowsheet documentation for full assessment, interventions and recommendations  Note: Prognosis: Fair  Problem List: Decreased strength, Decreased endurance, Impaired balance, Decreased mobility, Decreased cognition, Decreased safety awareness, Obesity, Pain  Assessment: Pt is a [de-identified] y o  female seen for PT evaluation s/p admit to [address] [place] [address] on 8/18/2022 w/ UTI (urinary tract infection)  Order placed for PT  Comorbidities affecting pt's physical performance at time of assessment include: obesity and limited cognition  Personal factors affecting pt at time of IE include: advanced age, inability to ambulate household distances and recent fall(s)  Prior to admission, pt was was independent w/ all functional mobility w/ rollator and lived at [facility] in [employer] dementia unit  Upon evaluation: Pt requires min A for bed mobility, min A for sit to stand, and min A for ambulation with RW  (Please find full objective findings from PT assessment regarding body systems outlined above)  Impairments and limitations also listed above, especially due to  weakness, impaired balance, decreased endurance, gait deviations, pain, decreased activity tolerance, decreased safety awareness, impaired judgement, fall risk and decreased cognition  Pt's clinical presentation is currently unstable/unpredictable seen in pt's presentation of fall risk, current need for posey lap belt, new need for LSO, new spinal precautions, and decreased safety awareness    Pt to benefit from continued skilled PT tx while in hospital and upon DC to address deficits as defined above and maximize level of functional mobility  From PT/mobility standpoint, recommendation at time of d/c would be return to facility with increased assist for mobility vs  inpatient rehab   Recommend progression of ambulation and initiation of HEP as appropriate  PT Discharge Recommendation: Return to facility with rehabilitation services (vs  rehab pending level of assist available)    See flowsheet documentation for full assessment

## 2022-09-14 NOTE — PROGRESS NOTES
Fall today  Confused and agitated per nursing notes    CT C/A/P images and report reviewed:  T12 fx, likely old, L2 fx ? Age    Full consult to follow    Consider MRI (if able) to age fxs  Follow up as outpatient with Neurosurgery APs in 2 weeks with new T/L spine X-rays prior to appt    Consider medical treatment for osteoporosis as outpatient, if not already taking

## 2022-09-14 NOTE — PLAN OF CARE
Problem: Potential for Falls  Goal: Patient will remain free of falls  Description: INTERVENTIONS:  - Educate patient/family on patient safety including physical limitations  - Instruct patient to call for assistance with activity   - Consult OT/PT to assist with strengthening/mobility   - Keep Call bell within reach  - Keep bed low and locked with side rails adjusted as appropriate  - Keep care items and personal belongings within reach  - Initiate and maintain comfort rounds  - Make Fall Risk Sign visible to staff  - Offer Toileting every *** Hours, in advance of need  - Initiate/Maintain ***alarm  - Obtain necessary fall risk management equipment: ***  - Apply yellow socks and bracelet for high fall risk patients  - Consider moving patient to room near nurses station  Outcome: Progressing     Problem: MOBILITY - ADULT  Goal: Maintain or return to baseline ADL function  Description: INTERVENTIONS:  -  Assess patient's ability to carry out ADLs; assess patient's baseline for ADL function and identify physical deficits which impact ability to perform ADLs (bathing, care of mouth/teeth, toileting, grooming, dressing, etc )  - Assess/evaluate cause of self-care deficits   - Assess range of motion  - Assess patient's mobility; develop plan if impaired  - Assess patient's need for assistive devices and provide as appropriate  - Encourage maximum independence but intervene and supervise when necessary  - Involve family in performance of ADLs  - Assess for home care needs following discharge   - Consider OT consult to assist with ADL evaluation and planning for discharge  - Provide patient education as appropriate  Outcome: Progressing  Goal: Maintains/Returns to pre admission functional level  Description: INTERVENTIONS:  - Perform BMAT or MOVE assessment daily    - Set and communicate daily mobility goal to care team and patient/family/caregiver     - Collaborate with rehabilitation services on mobility goals if consulted  - Perform Range of Motion *** times a day  - Reposition patient every *** hours    - Dangle patient *** times a day  - Stand patient *** times a day  - Ambulate patient *** times a day  - Out of bed to chair *** times a day   - Out of bed for meals *** times a day  - Out of bed for toileting  - Record patient progress and toleration of activity level   Outcome: Progressing     Problem: PAIN - ADULT  Goal: Verbalizes/displays adequate comfort level or baseline comfort level  Description: Interventions:  - Encourage patient to monitor pain and request assistance  - Assess pain using appropriate pain scale  - Administer analgesics based on type and severity of pain and evaluate response  - Implement non-pharmacological measures as appropriate and evaluate response  - Consider cultural and social influences on pain and pain management  - Notify physician/advanced practitioner if interventions unsuccessful or patient reports new pain  Outcome: Progressing     Problem: SAFETY ADULT  Goal: Patient will remain free of falls  Description: INTERVENTIONS:  - Educate patient/family on patient safety including physical limitations  - Instruct patient to call for assistance with activity   - Consult OT/PT to assist with strengthening/mobility   - Keep Call bell within reach  - Keep bed low and locked with side rails adjusted as appropriate  - Keep care items and personal belongings within reach  - Initiate and maintain comfort rounds  - Make Fall Risk Sign visible to staff  - Offer Toileting every *** Hours, in advance of need  - Initiate/Maintain ***alarm  - Obtain necessary fall risk management equipment: ***  - Apply yellow socks and bracelet for high fall risk patients  - Consider moving patient to room near nurses station  Outcome: Progressing  Goal: Maintain or return to baseline ADL function  Description: INTERVENTIONS:  -  Assess patient's ability to carry out ADLs; assess patient's baseline for ADL function and identify physical deficits which impact ability to perform ADLs (bathing, care of mouth/teeth, toileting, grooming, dressing, etc )  - Assess/evaluate cause of self-care deficits   - Assess range of motion  - Assess patient's mobility; develop plan if impaired  - Assess patient's need for assistive devices and provide as appropriate  - Encourage maximum independence but intervene and supervise when necessary  - Involve family in performance of ADLs  - Assess for home care needs following discharge   - Consider OT consult to assist with ADL evaluation and planning for discharge  - Provide patient education as appropriate  Outcome: Progressing  Goal: Maintains/Returns to pre admission functional level  Description: INTERVENTIONS:  - Perform BMAT or MOVE assessment daily    - Set and communicate daily mobility goal to care team and patient/family/caregiver  - Collaborate with rehabilitation services on mobility goals if consulted  - Perform Range of Motion *** times a day  - Reposition patient every *** hours    - Dangle patient *** times a day  - Stand patient *** times a day  - Ambulate patient *** times a day  - Out of bed to chair *** times a day   - Out of bed for meals *** times a day  - Out of bed for toileting  - Record patient progress and toleration of activity level   Outcome: Progressing     Problem: Prexisting or High Potential for Compromised Skin Integrity  Goal: Skin integrity is maintained or improved  Description: INTERVENTIONS:  - Identify patients at risk for skin breakdown  - Assess and monitor skin integrity  - Assess and monitor nutrition and hydration status  - Monitor labs   - Assess for incontinence   - Turn and reposition patient  - Assist with mobility/ambulation  - Relieve pressure over bony prominences  - Avoid friction and shearing  - Provide appropriate hygiene as needed including keeping skin clean and dry  - Evaluate need for skin moisturizer/barrier cream  - Collaborate with interdisciplinary team   - Patient/family teaching  - Consider wound care consult   Outcome: Progressing
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
## 2022-09-14 NOTE — PLAN OF CARE
Problem: MOBILITY - ADULT  Goal: Maintain or return to baseline ADL function  Description: INTERVENTIONS:  -  Assess patient's ability to carry out ADLs; assess patient's baseline for ADL function and identify physical deficits which impact ability to perform ADLs (bathing, care of mouth/teeth, toileting, grooming, dressing, etc )  - Assess/evaluate cause of self-care deficits   - Assess range of motion  - Assess patient's mobility; develop plan if impaired  - Assess patient's need for assistive devices and provide as appropriate  - Encourage maximum independence but intervene and supervise when necessary  - Involve family in performance of ADLs  - Assess for home care needs following discharge   - Consider OT consult to assist with ADL evaluation and planning for discharge  - Provide patient education as appropriate  Outcome: Not Progressing     Problem: PAIN - ADULT  Goal: Verbalizes/displays adequate comfort level or baseline comfort level  Description: Interventions:  - Encourage patient to monitor pain and request assistance  - Assess pain using appropriate pain scale  - Administer analgesics based on type and severity of pain and evaluate response  - Implement non-pharmacological measures as appropriate and evaluate response  - Consider cultural and social influences on pain and pain management  - Notify physician/advanced practitioner if interventions unsuccessful or patient reports new pain  Outcome: Not Progressing
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
## 2022-09-14 NOTE — PROCEDURES
ECG 12 lead    Date/Time: 9/14/2022 1:19 AM  Performed by: Francesco Mahmood PA-C  Authorized by: Unknown Provider     Indications / Diagnosis:  Ground level fall; mechanical- check qtc  ECG reviewed by me, the ED Provider: yes    Patient location:  ED  Previous ECG:     Comparison to cardiac monitor: Yes    Interpretation:     Interpretation: normal    Rate:     ECG rate:  107    ECG rate assessment: tachycardic    Rhythm:     Rhythm: sinus tachycardia    Ectopy:     Ectopy: none    QRS:     QRS axis:  Normal    QRS intervals:  Normal  Conduction:     Conduction: normal    ST segments:     ST segments:  Normal  T waves:     T waves: normal    Other findings:     Other findings comment:  QT/QTc- 342/456
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
## 2022-09-15 ENCOUNTER — APPOINTMENT (OUTPATIENT)
Dept: RADIOLOGY | Facility: HOSPITAL | Age: 82
DRG: 552 | End: 2022-09-15
Payer: MEDICARE

## 2022-09-15 PROBLEM — G89.11 ACUTE PAIN DUE TO TRAUMA: Status: ACTIVE | Noted: 2022-09-15

## 2022-09-15 PROBLEM — I48.0 PAROXYSMAL ATRIAL FIBRILLATION (HCC): Status: ACTIVE | Noted: 2022-09-15

## 2022-09-15 PROBLEM — Z86.79 HISTORY OF HYPERTENSION: Status: ACTIVE | Noted: 2022-09-15

## 2022-09-15 LAB
ANION GAP SERPL CALCULATED.3IONS-SCNC: 7 MMOL/L (ref 4–13)
ANION GAP SERPL CALCULATED.3IONS-SCNC: 7 MMOL/L (ref 4–13)
BUN SERPL-MCNC: 10 MG/DL (ref 5–25)
BUN SERPL-MCNC: 10 MG/DL (ref 5–25)
CALCIUM SERPL-MCNC: 9.8 MG/DL (ref 8.4–10.2)
CALCIUM SERPL-MCNC: 9.8 MG/DL (ref 8.4–10.2)
CHLORIDE SERPL-SCNC: 105 MMOL/L (ref 96–108)
CHLORIDE SERPL-SCNC: 105 MMOL/L (ref 96–108)
CO2 SERPL-SCNC: 25 MMOL/L (ref 21–32)
CO2 SERPL-SCNC: 25 MMOL/L (ref 21–32)
CREAT SERPL-MCNC: 0.6 MG/DL (ref 0.6–1.3)
CREAT SERPL-MCNC: 0.6 MG/DL (ref 0.6–1.3)
ERYTHROCYTE [DISTWIDTH] IN BLOOD BY AUTOMATED COUNT: 16.3 % (ref 11.6–15.1)
ERYTHROCYTE [DISTWIDTH] IN BLOOD BY AUTOMATED COUNT: 16.3 % (ref 11.6–15.1)
GFR SERPL CREATININE-BSD FRML MDRD: 86 ML/MIN/1.73SQ M
GFR SERPL CREATININE-BSD FRML MDRD: 86 ML/MIN/1.73SQ M
GLUCOSE SERPL-MCNC: 164 MG/DL (ref 65–140)
GLUCOSE SERPL-MCNC: 164 MG/DL (ref 65–140)
HCT VFR BLD AUTO: 46.5 % (ref 34.8–46.1)
HCT VFR BLD AUTO: 46.5 % (ref 34.8–46.1)
HGB BLD-MCNC: 15.5 G/DL (ref 11.5–15.4)
HGB BLD-MCNC: 15.5 G/DL (ref 11.5–15.4)
MCH RBC QN AUTO: 31.6 PG (ref 26.8–34.3)
MCH RBC QN AUTO: 31.6 PG (ref 26.8–34.3)
MCHC RBC AUTO-ENTMCNC: 33.3 G/DL (ref 31.4–37.4)
MCHC RBC AUTO-ENTMCNC: 33.3 G/DL (ref 31.4–37.4)
MCV RBC AUTO: 95 FL (ref 82–98)
MCV RBC AUTO: 95 FL (ref 82–98)
PLATELET # BLD AUTO: 292 THOUSANDS/UL (ref 149–390)
PLATELET # BLD AUTO: 292 THOUSANDS/UL (ref 149–390)
PMV BLD AUTO: 10.5 FL (ref 8.9–12.7)
PMV BLD AUTO: 10.5 FL (ref 8.9–12.7)
POTASSIUM SERPL-SCNC: 3.8 MMOL/L (ref 3.5–5.3)
POTASSIUM SERPL-SCNC: 3.8 MMOL/L (ref 3.5–5.3)
RBC # BLD AUTO: 4.9 MILLION/UL (ref 3.81–5.12)
RBC # BLD AUTO: 4.9 MILLION/UL (ref 3.81–5.12)
SODIUM SERPL-SCNC: 137 MMOL/L (ref 135–147)
SODIUM SERPL-SCNC: 137 MMOL/L (ref 135–147)
WBC # BLD AUTO: 7.01 THOUSAND/UL (ref 4.31–10.16)
WBC # BLD AUTO: 7.01 THOUSAND/UL (ref 4.31–10.16)

## 2022-09-15 PROCEDURE — 97530 THERAPEUTIC ACTIVITIES: CPT

## 2022-09-15 PROCEDURE — 99232 SBSQ HOSP IP/OBS MODERATE 35: CPT | Performed by: SURGERY

## 2022-09-15 PROCEDURE — 72100 X-RAY EXAM L-S SPINE 2/3 VWS: CPT

## 2022-09-15 PROCEDURE — 85027 COMPLETE CBC AUTOMATED: CPT | Performed by: EMERGENCY MEDICINE

## 2022-09-15 PROCEDURE — 97116 GAIT TRAINING THERAPY: CPT

## 2022-09-15 PROCEDURE — 99232 SBSQ HOSP IP/OBS MODERATE 35: CPT | Performed by: INTERNAL MEDICINE

## 2022-09-15 PROCEDURE — 80048 BASIC METABOLIC PNL TOTAL CA: CPT | Performed by: EMERGENCY MEDICINE

## 2022-09-15 RX ORDER — LORAZEPAM 0.5 MG/1
0.5 TABLET ORAL 2 TIMES DAILY
Status: DISCONTINUED | OUTPATIENT
Start: 2022-09-15 | End: 2022-09-16 | Stop reason: HOSPADM

## 2022-09-15 RX ORDER — HYDRALAZINE HYDROCHLORIDE 20 MG/ML
5 INJECTION INTRAMUSCULAR; INTRAVENOUS EVERY 6 HOURS PRN
Status: DISCONTINUED | OUTPATIENT
Start: 2022-09-15 | End: 2022-09-15

## 2022-09-15 RX ORDER — OLANZAPINE 2.5 MG/1
2.5 TABLET ORAL DAILY
Status: DISCONTINUED | OUTPATIENT
Start: 2022-09-16 | End: 2022-09-16 | Stop reason: HOSPADM

## 2022-09-15 RX ORDER — FAMOTIDINE 20 MG/1
20 TABLET, FILM COATED ORAL DAILY
Status: DISCONTINUED | OUTPATIENT
Start: 2022-09-15 | End: 2022-09-16 | Stop reason: HOSPADM

## 2022-09-15 RX ORDER — ATORVASTATIN CALCIUM 10 MG/1
10 TABLET, FILM COATED ORAL DAILY
Status: DISCONTINUED | OUTPATIENT
Start: 2022-09-15 | End: 2022-09-16 | Stop reason: HOSPADM

## 2022-09-15 RX ORDER — LISINOPRIL 10 MG/1
10 TABLET ORAL DAILY
Status: DISCONTINUED | OUTPATIENT
Start: 2022-09-15 | End: 2022-09-16 | Stop reason: HOSPADM

## 2022-09-15 RX ORDER — FUROSEMIDE 40 MG/1
20 TABLET ORAL DAILY
Status: DISCONTINUED | OUTPATIENT
Start: 2022-09-15 | End: 2022-09-16 | Stop reason: HOSPADM

## 2022-09-15 RX ORDER — METOPROLOL SUCCINATE 50 MG/1
50 TABLET, EXTENDED RELEASE ORAL DAILY
Status: DISCONTINUED | OUTPATIENT
Start: 2022-09-15 | End: 2022-09-15

## 2022-09-15 RX ORDER — ESCITALOPRAM OXALATE 10 MG/1
10 TABLET ORAL DAILY
Status: DISCONTINUED | OUTPATIENT
Start: 2022-09-15 | End: 2022-09-16 | Stop reason: HOSPADM

## 2022-09-15 RX ADMIN — OXYCODONE HYDROCHLORIDE 2.5 MG: 5 TABLET ORAL at 19:41

## 2022-09-15 RX ADMIN — ATORVASTATIN CALCIUM 10 MG: 10 TABLET, FILM COATED ORAL at 12:41

## 2022-09-15 RX ADMIN — METOPROLOL TARTRATE 25 MG: 25 TABLET, FILM COATED ORAL at 21:25

## 2022-09-15 RX ADMIN — LORAZEPAM 0.5 MG: 0.5 TABLET ORAL at 12:39

## 2022-09-15 RX ADMIN — LISINOPRIL 10 MG: 10 TABLET ORAL at 12:39

## 2022-09-15 RX ADMIN — METOPROLOL TARTRATE 25 MG: 25 TABLET, FILM COATED ORAL at 12:39

## 2022-09-15 RX ADMIN — POLYETHYLENE GLYCOL 3350 17 G: 17 POWDER, FOR SOLUTION ORAL at 09:33

## 2022-09-15 RX ADMIN — ESCITALOPRAM OXALATE 10 MG: 10 TABLET ORAL at 12:39

## 2022-09-15 RX ADMIN — LORAZEPAM 0.5 MG: 0.5 TABLET ORAL at 21:25

## 2022-09-15 RX ADMIN — FAMOTIDINE 20 MG: 20 TABLET ORAL at 12:41

## 2022-09-15 RX ADMIN — APIXABAN 5 MG: 5 TABLET, FILM COATED ORAL at 12:41

## 2022-09-15 RX ADMIN — ACETAMINOPHEN 975 MG: 325 TABLET, FILM COATED ORAL at 05:13

## 2022-09-15 RX ADMIN — ACETAMINOPHEN 975 MG: 325 TABLET, FILM COATED ORAL at 14:17

## 2022-09-15 RX ADMIN — ENOXAPARIN SODIUM 30 MG: 30 INJECTION SUBCUTANEOUS at 09:33

## 2022-09-15 RX ADMIN — OXYCODONE HYDROCHLORIDE 5 MG: 5 TABLET ORAL at 04:09

## 2022-09-15 RX ADMIN — FUROSEMIDE 20 MG: 40 TABLET ORAL at 12:39

## 2022-09-15 RX ADMIN — OLANZAPINE 2.5 MG: 10 INJECTION, POWDER, FOR SOLUTION INTRAMUSCULAR at 11:42

## 2022-09-15 RX ADMIN — ACETAMINOPHEN 975 MG: 325 TABLET, FILM COATED ORAL at 21:25

## 2022-09-15 NOTE — PLAN OF CARE
Problem: PHYSICAL THERAPY ADULT  Goal: Performs mobility at highest level of function for planned discharge setting  See evaluation for individualized goals  Description: Treatment/Interventions: Functional transfer training, LE strengthening/ROM, Therapeutic exercise, Endurance training, Cognitive reorientation, Patient/family training, Equipment eval/education, Bed mobility, Gait training  Equipment Recommended: Aida Martinez       See flowsheet documentation for full assessment, interventions and recommendations  Outcome: Progressing  Note: Prognosis: Fair  Problem List: Decreased strength, Decreased endurance, Impaired balance, Decreased mobility, Decreased cognition, Decreased safety awareness, Obesity, Pain  Assessment: pt began tx session lying supine in the bed with bilateral wrist restraints on  PT intervention okayed by RN and PT was agreeable to participate in PT intervention  progress was noted this tx session as pt was able to complete a supine<>sit EOB transfer w/ a decrease in level of assistance required /s with VC's for using bed rail to assist with transfer to EOB  pt was able to sit EOB w/o LOB while completing TE activities  pt continues to remain consistant with min Ax1 for all functional transfers to and from rW with VC's for hand placement in order to increase safety and balance  pt was able to increase ambulation distance compared to previous tx sessions but continues to be limiuted with functional; mobility, activity tolerance and ambulation distance as pt required a therapeutic seated rest break after 50'x1 RW of ambulation  pt w/o LOB while ambulating in todays tx session with RW  pt would benefit from continued skilled PT intervention in order to increase functional mobility and ambulation distance        PT Discharge Recommendation: Return to facility with rehabilitation services    See flowsheet documentation for full assessment

## 2022-09-15 NOTE — PHYSICAL THERAPY NOTE
PHYSICAL THERAPY NOTE          Patient Name: Shane Su  [id number] Date: 9/15/2022         09/15/22 1010   PT Last Visit   PT Visit Date 09/15/22   Note Type   Note Type Treatment   Type of Brace   Brace Applied [place]ododendron LSO   Additional Brace Ordered No   Patient Position When Brace Applied Seated  (seated EOB)   End of Consult   Patient Position at End of Consult Other (comment)  ( headed of the floor for an X ray)   Pain Assessment   Pain Assessment Tool 0-10   Pain Score 3   Pain Location/Orientation Location: Back   Effect of Pain on Daily Activities limits functional mobility and activity tolerance   Patient's Stated Pain Goal No pain   Hospital Pain Intervention(s) Repositioned; Ambulation/increased activity; Rest   Multiple Pain Sites No   Pain Rating: FLACC (Rest) - Face 0   Pain Rating: FLACC (Rest) - Legs 0   Pain Rating: FLACC (Rest) - Activity 0   Pain Rating: FLACC (Rest) - Cry 0   Pain Rating: FLACC (Rest) - Consolability 0   Score: FLACC (Rest) 0   Pain Rating: FLACC (Activity) - Face 0   Pain Rating: FLACC (Activity) - Legs 0   Pain Rating: FLACC (Activity) - Activity 0   Pain Rating: FLACC (Activity) - Cry 0   Pain Rating: FLACC (Activity) - Consolability 0   Score: FLACC (Activity) 0   Restrictions/Precautions   Weight Bearing Precautions Per Order No   Braces or Orthoses (S)  LSO  (pt required mod Ax1 for donning of LSO)   Other Precautions Cognitive; Chair Alarm; Bed Alarm; Restraints;Spinal precautions;Pain  (pre tx session pt in bilateral wrist restraints  post tx pt in [facility] headed off the floor for an X ray)   General   Chart Reviewed Yes   Response to Previous Treatment Patient with no complaints from previous session  Family/Caregiver Present No   Cognition   Overall Cognitive Status Impaired   Arousal/Participation Alert; Responsive; Cooperative   Attention Attends with cues to redirect   Orientation Level Oriented to person;Oriented to situation;Oriented to place   Memory Decreased short term memory;Decreased recall of recent events   Following Commands Follows one step commands with increased time or repetition   Comments pt continues to be pleasantly confused as pt had difficulty completing sensible sentences   Subjective   Subjective pt was agreeable to participate in PT intervention   Bed Mobility   Supine to Sit 5  Supervision   Additional items Assist x 1;HOB elevated; Bedrails; Increased time required;Verbal cues   Sit to Supine Unable to assess   Additional Comments post tx pt in [facility] and was going for an X ray with PCA   Transfers   Sit to Stand 4  Minimal assistance   Additional items Assist x 1; Increased time required;Verbal cues  (w/ RW)   Stand to Sit 4  Minimal assistance   Additional items Assist x 1; Increased time required;Verbal cues  (w/ RW)   Stand pivot 4  Minimal assistance   Additional items Assist x 1; Increased time required;Verbal cues  (w/ RW)   Additional Comments pt continues to require RW to complete all functional transfers in todays tx session with VC's for RW management   Ambulation/Elevation   Gait pattern Decreased foot clearance; Foward flexed; Short stride; Excessively slow;Decreased heel strike;Decreased toe off   Gait Assistance 4  Minimal assist   Additional items Assist x 1;Verbal cues   Assistive Device Rolling walker   Distance 50'x1 RW   Stair Management Assistance Not tested   Balance   Static Sitting Good   Dynamic Sitting Fair +   Static Standing Fair -   Dynamic Standing Poor +   Ambulatory Poor +   Endurance Deficit   Endurance Deficit Yes   Endurance Deficit Description limited ambulation distance   Activity Tolerance   Activity Tolerance Patient limited by fatigue   Nurse Made Aware Spoke to RN Minta Zamudio   Exercises   Hip Abduction Sitting;10 reps;AROM; Bilateral   Hip Adduction 10 reps;AROM; Bilateral  (pillow squeezes)   Knee AROM Long Arc Quad Sitting;15 reps;AROM; Bilateral Ankle Pumps Sitting;15 reps;AROM; Bilateral   Marching Sitting;10 reps;AROM; Bilateral   Assessment   Prognosis Fair   Problem List Decreased strength;Decreased endurance; Impaired balance;Decreased mobility; Decreased cognition;Decreased safety awareness; Obesity;Pain   Assessment pt began tx session lying supine in the bed with bilateral wrist restraints on  PT intervention okayed by RN and PT was agreeable to participate in PT intervention  progress was noted this tx session as pt was able to complete a supine<>sit EOB transfer w/ a decrease in level of assistance required /s with VC's for using bed rail to assist with transfer to EOB  pt was able to sit EOB w/o LOB while completing TE activities  pt continues to remain consistant with min Ax1 for all functional transfers to and from rW with VC's for hand placement in order to increase safety and balance  pt was able to increase ambulation distance compared to previous tx sessions but continues to be limiuted with functional; mobility, activity tolerance and ambulation distance as pt required a therapeutic seated rest break after 50'x1 RW of ambulation  pt w/o LOB while ambulating in todays tx session with RW  pt would benefit from continued skilled PT intervention in order to increase functional mobility and ambulation distance   Goals   Patient Goals to go home soon   STG Expiration Date 09/24/22   PT Treatment Day 1   Plan   Treatment/Interventions Functional transfer training;LE strengthening/ROM; Therapeutic exercise; Endurance training;Cognitive reorientation;Patient/family training;Equipment eval/education; Bed mobility;Gait training;Spoke to nursing   Progress Slow progress, decreased activity tolerance   PT Frequency 3-5x/wk   Recommendation   PT Discharge Recommendation Return to facility with rehabilitation services   Equipment Recommended [address]erm[address]z 435   Turning in Bed Without Bedrails 3 Lying on Back to Sitting on Edge of Flat Bed 3   Moving Bed to Chair 3   Standing Up From Chair 3   Walk in Room 3   Climb 3-5 Stairs 1   Basic Mobility Inpatient Raw Score 16   Basic Mobility Standardized Score 38 32   Highest Level Of Mobility   JH-HLM Goal 5: Stand one or more mins   JH-HLM Achieved 7: Walk 25 feet or more   Education   Education Provided Mobility training   Patient Reinforcement needed   End of Consult   Patient Position at End of Consult Other (comment)  (WC)   The patient's AM-PAC Basic Mobility Inpatient Short Form Raw Score is 16  A Raw score of less than or equal to 16 suggests the patient may benefit from discharge to post-acute rehabilitation services  Please also refer to the recommendation of the Physical Therapist for safe discharge planning        Toño Villela, PTA

## 2022-09-15 NOTE — PROGRESS NOTES
Progress Note - Geriatric Medicine   Subhash Sims [de-identified] y o  female MRN: [id number]  Unit/Bed#: W -01 Encounter: [id number]      Assessment/Plan:  1 -dementia Alzheimer's type vascular   Patient was placed in restraints while in the ER and was put on delirium protocols  The she had another delirium event today and is currently in restraints  2 -delirium   patient had another delirious event today is currently in restraints  She he was given 2 5 mg Zyprexa  Recommend frequent reorientation  3 -recurrent falls   Patient has had recurrent falls and is currently anticoagulated because of her atrial fibrillation  Continue fall precautions    4 -compression deformity at the inferior endplate of L2   On likely able to lay still for long enough for an MRI  Unsure if fracture is acute or chronic  Continue geriatric pain protocol  5 -person atrial fibrillation   Patient is on Eliquis 5 mg p o  b i d   Patient takes metoprolol for rate control  Recommendations   Move Zyprexa to 2 5 mg b i d  to help with delirium    Subjective:   Patient isn't oriented but had no complaints  She expressed desire to go back to her nursing facility  Review of Systems   Unable to perform ROS: Dementia         Objective:     Vitals: Blood pressure 129/94, pulse 88, temperature 97 8 °F (36 6 °C), temperature source Oral, resp  rate 19, height 5' 7" (1 702 m), weight 104 kg (229 lb 4 5 oz), SpO2 95 %  ,Body mass index is 35 91 kg/m²  Intake/Output Summary (Last 24 hours) at 9/15/2022 1337  Last data filed at 9/15/2022 0841  Gross per 24 hour   Intake 0 ml   Output --   Net 0 ml       Current Medications: Reviewed    Physical Exam:   Physical Exam  HENT:      Head: Normocephalic and atraumatic  Cardiovascular:      Rate and Rhythm: Normal rate and regular rhythm  Pulses: Normal pulses  Heart sounds: Normal heart sounds     Pulmonary:      Effort: Pulmonary effort is normal       Breath sounds: Normal breath sounds  Abdominal:      General: Abdomen is flat  Bowel sounds are normal       Palpations: Abdomen is soft  Invasive Devices  Report    Peripheral Intravenous Line  Duration           Peripheral IV 09/14/22 Right Antecubital 1 day                Lab, Imaging and other studies:    Latest Reference Range & Units 09/15/22 06:05   Sodium 135 - 147 mmol/L 137   Potassium 3 5 - 5 3 mmol/L 3 8   Chloride 96 - 108 mmol/L 105   CO2 21 - 32 mmol/L 25   Anion Gap 4 - 13 mmol/L 7   BUN 5 - 25 mg/dL 10   Creatinine 0 60 - 1 30 mg/dL 0 60   Glucose, Random 65 - 140 mg/dL 164 (H)   Calcium 8 4 - 10 2 mg/dL 9 8   eGFR ml/min/1 73sq m 86   WBC 4 31 - 10 16 Thousand/uL 7 01   Red Blood Cell Count 3 81 - 5 12 Million/uL 4 90   Hemoglobin 11 5 - 15 4 g/dL 15 5 (H)   HCT 34 8 - 46 1 % 46 5 (H)   MCV 82 - 98 fL 95   MCH 26 8 - 34 3 pg 31 6   MCHC 31 4 - 37 4 g/dL 33 3   RDW 11 6 - 15 1 % 16 3 (H)   Platelet Count 536 - 390 Thousands/uL 292   MPV 8 9 - 12 7 fL 10 5   (H): Data is abnormally high    Patient assessed by Dr Ashely Burton and Josué REILLY   Scribed by Josué REILLY

## 2022-09-15 NOTE — PROGRESS NOTES
[facility]  [facility] Note Dearl Leland 1940, [de-identified] y o  female MRN: [id number]  Unit/Bed#: W -01 Encounter: [id number]  Primary Care Provider: No primary care provider on file  Date and time admitted to hospital: 9/14/2022 12:00 AM    Paroxysmal atrial fibrillation ([address] )  Assessment & Plan  - Continue current medication regimen   - Outpatient follow-up with PCP  History of hypertension  Assessment & Plan  - Continue current medication regimen   - Outpatient follow-up with PCP  Acute pain due to trauma  Assessment & Plan  - Acute pain secondary to traumatic injuries  - Bowel regimen as long as using opioids   - Continue to monitor pain and adjust regimen as indicated  Fall from ground level  Assessment & Plan  - Status post fall with the below noted injuries  - Fall precautions  - Geriatric Medicine consultation for evaluation, medication review and recommendations   - PT and OT evaluation and treatment as indicated  - Case Management consultation for disposition planning  * Closed compression fracture of L2 lumbar vertebra, initial encounter [facility])  Assessment & Plan  - L2 Fracture, present on admission   - Neurosurgery evaluation and recommendations appreciated  Non-operative management recommended  - Bracing: Maintain LSO brace whenever upright >45 degrees and/or out of bed  - Spine precautions  - Monitor neurovascular exam   - Multimodal analgesic regimen as needed  - Upright spine x-rays from 9/15 reviewed  - PT and OT evaluation and treatment as indicated  - Outpatient follow up with Neurosurgery for re-evaluation  DVT Prophylaxis: SCDs and Eliquis  PT and OT: eval and treat    Disposition:  DC planning  Patient medically stable for discharge at this time  Home medications resumed today on 09/15/2022  Outpatient follow-up with Neurosurgery      TERTIARY TRAUMA SURVEY NOTE    Code status:  Level 1 - Full Code    Consultants: Neurosurgery, PT, OT, Geriatric      SUBJECTIVE:     Transfer from:  Not a transfer  Mechanism of Injury:Fall    Chief Complaint:  No new complaints    HPI/Last 24 hour events:  Patient resting in bed today cooperative on evaluation  Offering no new complaints  States her pain is well controlled      Active medications:           Current Facility-Administered Medications:     acetaminophen (TYLENOL) tablet 975 mg, 975 mg, Oral, Q8H SUSAN, 975 mg at 09/15/22 1417    apixaban (ELIQUIS) tablet 5 mg, 5 mg, Oral, BID, 5 mg at 09/15/22 1241    atorvastatin (LIPITOR) tablet 10 mg, 10 mg, Oral, Daily, 10 mg at 09/15/22 1241    escitalopram (LEXAPRO) tablet 10 mg, 10 mg, Oral, Daily, 10 mg at 09/15/22 1239    famotidine (PEPCID) tablet 20 mg, 20 mg, Oral, Daily, 20 mg at 09/15/22 1241    furosemide (LASIX) tablet 20 mg, 20 mg, Oral, Daily, 20 mg at 09/15/22 1239    lisinopril (ZESTRIL) tablet 10 mg, 10 mg, Oral, Daily, 10 mg at 09/15/22 1239    LORazepam (ATIVAN) tablet 0 5 mg, 0 5 mg, Oral, BID, 0 5 mg at 09/15/22 1239    metoprolol tartrate (LOPRESSOR) tablet 25 mg, 25 mg, Oral, Q12H SUSAN, 25 mg at 09/15/22 1239    naloxone (NARCAN) 0 04 mg/mL syringe 0 04 mg, 0 04 mg, Intravenous, Q1MIN PRN    [START ON 9/16/2022] OLANZapine (ZyPREXA) tablet 2 5 mg, 2 5 mg, Oral, Daily    ondansetron (ZOFRAN) injection 4 mg, 4 mg, Intravenous, Q4H PRN    oxyCODONE (ROXICODONE) IR tablet 2 5 mg, 2 5 mg, Oral, Q4H PRN    oxyCODONE (ROXICODONE) IR tablet 5 mg, 5 mg, Oral, Q4H PRN, 5 mg at 09/15/22 0404    polyethylene glycol (MIRALAX) packet 17 g, 17 g, Oral, Daily, 17 g at 09/15/22 0909      OBJECTIVE:     Vitals:   Vitals:    09/15/22 1622   BP: 169/100   Pulse:    Resp: 16   Temp:    SpO2:        Physical Exam:   GENERAL APPEARANCE:  No acute distress  NEURO:  GCS 14 1 off for confusion  HEENT:  Normocephalic  CV:  Regular rate and rhythm  LUNGS:  CTA bilaterally  GI:  Nontender, nondistended  :  No Hernandez  MSK:  +2 pulses on extremities  SKIN:  Warm, dry, intact      1  Before the illness or injury that brought you to the Emergency, did you need someone to help you on a regular basis? 1=Yes   2  Since the illness or injury that brought you to the Emergency, have you needed more help than usual to take care of yourself? 1=Yes   3  Have you been hospitalized for one or more nights during the past 6 months (excluding a stay in the Emergency Department)? 1=Yes   4  In general, do you see well? 0=Yes   5  In general, do you have serious problems with your memory? 1=Yes   6  Do you take more than three different medications everyday? 1=Yes   TOTAL   5     Did you order a geriatric consult if the score was 2 or greater?: yes                I/O:   I/O       09/13 0701 09/14 0700 09/14 0701  09/15 0700 09/15 0701  09/16 0700    P  O    120    Total Intake(mL/kg)   120 (1 2)    Net   +120                 Invasive Devices: Invasive Devices  Report    Peripheral Intravenous Line  Duration           Peripheral IV 09/15/22 Left Arm <1 day                  Imaging:   CT chest abdomen pelvis wo contrast    Result Date: 9/14/2022  Impression: Although the study was limited by the lack of intravenous contrast, there is no gross evidence of solid organ injury  No acute intra-abdominal abnormality  No free air or free fluid  Mild age indeterminant compression deformity at the inferior endplate of L2 which may be posttraumatic or degenerative in nature  No prior studies are available for comparison  Consider a nonemergent MRI lumbar spine for further evaluation  Scattered areas of groundglass density throughout both lungs with mild interlobular septal thickening may related to mild pulmonary vascular congestion  No pneumothorax  Mild cardiomegaly with no pericardial effusion    I personally discussed this study with Diana Kohler on 9/14/2022 at 12:38 AM  Workstation performed: [id number]     XR spine lumbar 2 or 3 views injury    Result Date: 9/15/2022  Impression: Acute L2 compression fracture  Consider consultation with interventional radiology for vertebral augmentation  Workstation performed: [id number]     TRAUMA - CT head wo contrast    Result Date: 9/14/2022  Impression: No acute intracranial abnormality  Mild chronic small vessel ischemic changes  Workstation performed: [id number]     TRAUMA - CT spine cervical wo contrast    Result Date: 9/14/2022  Impression: No cervical spine fracture or traumatic malalignment  Workstation performed: [id number]     XR chest 1 view    Result Date: 9/14/2022  Impression: Cardiomegaly  Faint groundglass infiltrates, correlate for infection versus CHF  Workstation performed: [id number]     XR Trauma multiple (SLB/SLRA trauma bay ONLY)    Result Date: 9/14/2022  Impression: Cardiomegaly  Faint groundglass infiltrates, correlate for infection versus CHF   Workstation performed: [id number]       Labs:   CBC:   Lab Results   Component Value Date    WBC 7 01 09/15/2022    HGB 15 5 (H) 09/15/2022    HCT 46 5 (H) 09/15/2022    MCV 95 09/15/2022     09/15/2022    MCH 31 6 09/15/2022    MCHC 33 3 09/15/2022    RDW 16 3 (H) 09/15/2022    MPV 10 5 09/15/2022     CMP:   Lab Results   Component Value Date     09/15/2022    CO2 25 09/15/2022    BUN 10 09/15/2022    CREATININE 0 60 09/15/2022    CALCIUM 9 8 09/15/2022    EGFR 86 09/15/2022

## 2022-09-16 ENCOUNTER — TELEPHONE (OUTPATIENT)
Dept: NEUROSURGERY | Facility: CLINIC | Age: 82
End: 2022-09-16

## 2022-09-16 VITALS
HEIGHT: 67 IN | SYSTOLIC BLOOD PRESSURE: 97 MMHG | SYSTOLIC BLOOD PRESSURE: 97 MMHG | OXYGEN SATURATION: 90 % | RESPIRATION RATE: 17 BRPM | HEART RATE: 57 BPM | TEMPERATURE: 97.5 F | DIASTOLIC BLOOD PRESSURE: 62 MMHG | HEIGHT: 67 IN | BODY MASS INDEX: 35.99 KG/M2 | WEIGHT: 229.28 LBS | RESPIRATION RATE: 17 BRPM | HEART RATE: 57 BPM | OXYGEN SATURATION: 90 % | BODY MASS INDEX: 35.99 KG/M2 | TEMPERATURE: 97.5 F | WEIGHT: 229.28 LBS | DIASTOLIC BLOOD PRESSURE: 62 MMHG

## 2022-09-16 PROCEDURE — 99238 HOSP IP/OBS DSCHRG MGMT 30/<: CPT | Performed by: PHYSICIAN ASSISTANT

## 2022-09-16 PROCEDURE — NC001 PR NO CHARGE: Performed by: PHYSICIAN ASSISTANT

## 2022-09-16 PROCEDURE — 99232 SBSQ HOSP IP/OBS MODERATE 35: CPT | Performed by: INTERNAL MEDICINE

## 2022-09-16 RX ORDER — OXYCODONE HYDROCHLORIDE 5 MG/1
2.5 TABLET ORAL EVERY 4 HOURS PRN
Qty: 15 TABLET | Refills: 0 | Status: SHIPPED | OUTPATIENT
Start: 2022-09-16 | End: 2022-09-26

## 2022-09-16 RX ADMIN — FAMOTIDINE 20 MG: 20 TABLET ORAL at 10:33

## 2022-09-16 RX ADMIN — OLANZAPINE 2.5 MG: 2.5 TABLET, FILM COATED ORAL at 10:57

## 2022-09-16 RX ADMIN — POLYETHYLENE GLYCOL 3350 17 G: 17 POWDER, FOR SOLUTION ORAL at 10:39

## 2022-09-16 RX ADMIN — APIXABAN 5 MG: 5 TABLET, FILM COATED ORAL at 10:32

## 2022-09-16 RX ADMIN — OXYCODONE HYDROCHLORIDE 5 MG: 5 TABLET ORAL at 04:57

## 2022-09-16 RX ADMIN — LORAZEPAM 0.5 MG: 0.5 TABLET ORAL at 10:36

## 2022-09-16 RX ADMIN — ATORVASTATIN CALCIUM 10 MG: 10 TABLET, FILM COATED ORAL at 10:33

## 2022-09-16 RX ADMIN — ACETAMINOPHEN 975 MG: 325 TABLET, FILM COATED ORAL at 05:03

## 2022-09-16 RX ADMIN — ACETAMINOPHEN 975 MG: 325 TABLET, FILM COATED ORAL at 16:21

## 2022-09-16 RX ADMIN — FUROSEMIDE 20 MG: 40 TABLET ORAL at 10:33

## 2022-09-16 RX ADMIN — LISINOPRIL 10 MG: 10 TABLET ORAL at 10:33

## 2022-09-16 RX ADMIN — ESCITALOPRAM OXALATE 10 MG: 10 TABLET ORAL at 10:33

## 2022-09-16 RX ADMIN — METOPROLOL TARTRATE 25 MG: 25 TABLET, FILM COATED ORAL at 10:35

## 2022-09-16 NOTE — PROGRESS NOTES
Progress Note - Geriatric Medicine   Dona Solo [de-identified] y o  female MRN: [id number]  Unit/Bed#: W -01 Encounter: [id number]      Assessment/Plan:  1 -Dementia Alzheimer's type vascular   At nursing facility patient takes Zyprexa 2 5 mg 1:00 p m  Recommend Zyprexa be moved to later in the evening to prevent sundowning  Continue following with Neurology  2 -delirium   Patient has not been given Zyprexa since yesterday  Patient is not currently delirious  3 -recurrent falls   Patient has had multiple falls and high risk for bleeding due to anticoagulation  Continue fall precautions  4 -compression deformity at the inferior endplate of L2   Compression fracture found on spine x-ray  It is unknown if fracture is acute or chronic  Patient reports no pain the pain or to her eyes continue geriatric pain protocol  5 -persistent atrial fibrillation   Patient takes Eliquis for anticoagulation and metoprolol for rate control  Patient was slightly bradycardic this morning at 53    Patient is medically stable to be discharged to her nursing home  Subjective:   Patient reports that she had a good night and slept very well  She is excited to go back home  No other complaints    Review of Systems   Respiratory: Negative for cough and shortness of breath  Cardiovascular: Negative for chest pain and palpitations  Gastrointestinal: Negative for abdominal pain and vomiting  Genitourinary: Negative for dysuria and hematuria  Musculoskeletal: Negative for arthralgias and back pain  Skin: Negative for color change and rash  Neurological: Negative for seizures and syncope  All other systems reviewed and are negative  Objective:     Vitals: Blood pressure 146/72, pulse (!) 53, temperature 97 5 °F (36 4 °C), temperature source Oral, resp  rate 17, height 5' 7" (1 702 m), weight 104 kg (229 lb 4 5 oz), SpO2 90 %  ,Body mass index is 35 91 kg/m²        Intake/Output Summary (Last 24 hours) at 9/16/2022 0900  Last data filed at 9/15/2022 1753  Gross per 24 hour   Intake 120 ml   Output --   Net 120 ml       Current Medications: Reviewed    Physical Exam:   Physical Exam  HENT:      Head: Normocephalic and atraumatic  Right Ear: Ear canal and external ear normal       Left Ear: Ear canal and external ear normal    Cardiovascular:      Rate and Rhythm: Normal rate and regular rhythm  Pulses: Normal pulses  Heart sounds: Normal heart sounds  Pulmonary:      Effort: Pulmonary effort is normal       Breath sounds: Normal breath sounds  Abdominal:      General: Abdomen is flat  Bowel sounds are normal       Palpations: Abdomen is soft  Neurological:      Mental Status: Mental status is at baseline  Invasive Devices  Report    Peripheral Intravenous Line  Duration           Peripheral IV 09/15/22 Left Arm <1 day                Lab, Imaging and other studies: none    Patient assessed by Dr Nicolle Dueñas and Brittney Gonzalez PA-C   Scribed by Brittney Gonzalez PA-C

## 2022-09-16 NOTE — TREATMENT PLAN
I re-evaluated the patient this morning, reports lower back pain worse with movement  Exam: Patient with Hx of Dementia  Tenderness in the Upper lumbar region on posterior spine palpation, also tenderness on bilateral calf palpation, without erythema/edema/swelling  Otherwise, neurologically non focal  Some exam limited as patient is not consistent with her response  Upright XR of Lumbar spine demonstrates acute  Compression fracture without posterior element involvement based on last  CT CAP  Plan:  1  Recommend IR consult for KP/VA eval  2  VAS eval of LEs  3  Continue wearing the brace when upright and at 45 degree HOB  4  Multimodal pain control  5  Fall precaution  6  Consider F/U at OP NSx clinic in two weeks with upright Lumbar spine x-rays in brace  7   NSx will follow from the periphery, while in patient

## 2022-09-16 NOTE — INCIDENTAL FINDINGS
The following findings require follow up:  Radiographic finding   Findin  ADRENAL GLANDS:  There is low density lobulated thickening of adrenal glands bilaterally statistically most likely to represent adenomatous hyperplasia  Follow up required: Yes   Follow up should be done within 2-4 week(s)    Please notify the following clinician to assist with the follow up:   Primary Care Provider  Spoke with son over the phone  He expressed verbal understanding of the above finding

## 2022-09-16 NOTE — TELEPHONE ENCOUNTER
9/16/22: INPATIENT    2 WK HFU W/ AP     10/5/22 / 2:30 / [facility]    IMAGING:  Roberto Shaikh

## 2022-09-16 NOTE — CASE MANAGEMENT
Case Management Discharge Planning Note    Patient name [facility]  Location W /W -17 MRN [id number]  : 1940 Date 2022       Current Admission Date: 2022  Current Admission Diagnosis:Closed compression fracture of L2 lumbar vertebra, initial encounter [facility])   Patient Active Problem List    Diagnosis Date Noted    Acute pain due to trauma 09/15/2022    History of hypertension 09/15/2022    Paroxysmal atrial [address] ([facility] [address] ) 09/15/2022    Closed compression fracture of L2 lumbar vertebra, initial encounter ([facility] [address] ) 2022    Fall from ground level 2022      LOS (days): 2  Geometric Mean LOS (GMLOS) (days): 2 90  Days to GMLOS:0 4     OBJECTIVE:  Risk of Unplanned Readmission Score: 12 73         Current admission status: Inpatient   Preferred Pharmacy: No Pharmacies Listed  Primary Care Provider: No primary care provider on file  Primary Insurance: MEDICARE  Secondary Insurance:     DISCHARGE DETAILS:    Discharge planning discussed with[de-identified] Son and Lydia Urban, Director for [facility] Dementia Unit  [place] of Choice: Yes  Comments - [place] of Choice: CM discussed discharge plans per care team recommendations with above party and all in agreement with having her return to [facility] Dementia Unit  CM requested a 2:30pm     CM contacted family/caregiver?: Yes  Were Treatment Team discharge recommendations reviewed with patient/caregiver?: Yes  Did patient/caregiver verbalize understanding of patient care needs?: N/A- going to facility (Returning back to [facility])  Were patient/caregiver advised of the risks associated with not following Treatment Team discharge recommendations?: Yes    Contacts  Patient Contacts: Kelli Glover -son  Relationship to Patient[de-identified] Family  Contact Method: Phone  Phone Number: [phone]  Reason/Outcome: Discharge [address]         Is the patient interested in [facility] at discharge?: No    DME Referral Provided  Referral made for DME?: No    Other Referral/Resources/Interventions Provided:  Interventions: Facility Return, Assisted Living  Referral Comments: Son in agreement to have patient return back to S Dementia unit      Would you like to participate in our [address] service program?  : No - Declined    Treatment Team Recommendation: Facility Return, Assisted Living  Discharge Destination Plan[de-identified] Assisted Living, Facility Return  Transport at Discharge : [facility]  Dispatcher Contacted: Yes  Number/Name of Dispatcher: [employer]     ETA of Transport (Date): 09/16/22  ETA of Transport (Time): 1430     Transfer Mode: Stretcher  Accompanied by: EMS personnel     IMM Given (Date):: 09/16/22  IMM Given to[de-identified] Family  Family notified[de-identified] Son       Accepting Facility Name, [address] [facility] : [address] [facility] [address]  Receiving Facility/Agency Phone Number: [phone]  Facility/Agency Fax Number: [phone]
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
## 2022-09-16 NOTE — CASE MANAGEMENT
Case Management Progress Note    Patient name Lilly Hernandez  Location W /W -01 MRN [id number]  : 1940 Date 2022       LOS (days): 2  Geometric Mean LOS (GMLOS) (days): 2 90  Days to GMLOS:0 5        OBJECTIVE:        Current admission status: Inpatient  Preferred Pharmacy: No Pharmacies Listed  Primary Care Provider: No primary care provider on file  Primary Insurance: MEDICARE  Secondary Insurance:     PROGRESS NOTE:    CM left 2 messages this morning inquiring if patient can return back to S dementia unit  CM will follow up

## 2022-09-16 NOTE — ASSESSMENT & PLAN NOTE
- L2 Fracture, present on admission   - Neurosurgery evaluation and recommendations appreciated  Non-operative management recommended  - Bracing: Maintain LSO brace whenever upright >45 degrees and/or out of bed  - Spine precautions  - Monitor neurovascular exam   - Multimodal analgesic regimen as needed  - Upright spine x-rays from 9/15 reviewed  - PT and OT evaluation and treatment as indicated  - Outpatient follow up with Neurosurgery for re-evaluation

## 2022-09-16 NOTE — PROGRESS NOTES
Attempted to call NH but no answer  Voicemail left to return phone call  Pt AVS and script faxed to facility

## 2022-09-19 ENCOUNTER — TELEPHONE (OUTPATIENT)
Dept: NEUROSURGERY | Facility: CLINIC | Age: 82
End: 2022-09-19

## 2022-09-19 NOTE — TELEPHONE ENCOUNTER
[place], [place]  to [place]      9/20/22 4:44 PM  SPOKE TO   AT [address] PATIENT NEEDS XR PRIOR TO OFFICE VISIT ON 10/5      9/20/22:   [facility] FOR [facility] TO CONFIRM OV/IMAGING   REQUESTED THEY CALL BACK TO CONFIRM      9/19/22:  DISCHARGED TO [address] [place] [address] # [phone]    9/16/22: INPATIENT     2 WK HFU W/ AP     10/5/22 / 2:30 / Vinayene Flight     IMAGING:  Willy Castellanos

## 2022-09-20 ENCOUNTER — TELEPHONE (OUTPATIENT)
Dept: NEUROSURGERY | Facility: CLINIC | Age: 82
End: 2022-09-20

## 2022-09-20 ENCOUNTER — IN HOME VISIT (OUTPATIENT)
Dept: GERIATRICS | Facility: OTHER | Age: 82
End: 2022-09-20
Payer: MEDICARE

## 2022-09-20 DIAGNOSIS — S32.020A CLOSED COMPRESSION FRACTURE OF L2 LUMBAR VERTEBRA, INITIAL ENCOUNTER (HCC): Primary | ICD-10-CM

## 2022-09-20 DIAGNOSIS — G30.1 LATE ONSET ALZHEIMER'S DEMENTIA WITH BEHAVIORAL DISTURBANCE (HCC): ICD-10-CM

## 2022-09-20 DIAGNOSIS — I10 ESSENTIAL HYPERTENSION: Chronic | ICD-10-CM

## 2022-09-20 DIAGNOSIS — F02.818 LATE ONSET ALZHEIMER'S DEMENTIA WITH BEHAVIORAL DISTURBANCE (HCC): ICD-10-CM

## 2022-09-20 DIAGNOSIS — I48.19 PERSISTENT ATRIAL FIBRILLATION (HCC): ICD-10-CM

## 2022-09-20 PROCEDURE — 99336 PR DOM/R-HOME E/M EST PT MOD HI SEVERITY 40 MINUTES: CPT | Performed by: NURSE PRACTITIONER
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
## 2022-09-22 ENCOUNTER — IN HOME VISIT (OUTPATIENT)
Dept: GERIATRICS | Facility: OTHER | Age: 82
End: 2022-09-22
Payer: MEDICARE

## 2022-09-22 DIAGNOSIS — M25.562 ACUTE PAIN OF LEFT KNEE: Primary | ICD-10-CM

## 2022-09-22 PROCEDURE — 99335 PR DOM/R-HOME E/M EST PT LW MOD SEVERITY 25 MINUTES: CPT | Performed by: NURSE PRACTITIONER
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
## 2022-10-02 PROBLEM — M25.562 ACUTE PAIN OF LEFT KNEE: Status: ACTIVE | Noted: 2022-09-22

## 2022-10-02 NOTE — ASSESSMENT & PLAN NOTE
· L2 fracture present on recent hospital admission  · Discharged with  LSO brace whenever upright > 45 degrees, however, due to patient's dementia, she often refuses to wear the brace    · Continue Pain management with tylenol, low dose prn oxycodone and lidocaine topical analgesic  · PT consulted to evaluate and treat  · Follow-up with Neurosurgery

## 2022-10-02 NOTE — ASSESSMENT & PLAN NOTE
· Currently stable, occasional behaviors reported  · Continue Lorazepam and Zyprexa at current doses  · Redirect, reorient and reassure  · Continue pain management in the setting of acute and chronic spine fractures   · Continue current level of care on the locked  memory unit  · Follow-up with geriatric psychiatry, Dr Yuridia Stapleton

## 2022-10-02 NOTE — ASSESSMENT & PLAN NOTE
· BP soft today at 103/50  · SBP average trend 130-140's  · Continue Lisinopril and Metoprolol   · Repeat blood work ordered

## 2022-10-02 NOTE — ASSESSMENT & PLAN NOTE
· Rate controlled, goal HR < 100  · Continue Metoprolol 50 mg daily  · Continue Eliquis 5 mg 2 times daily for anticoagulation  · Monitor for bleeding due to fall risk

## 2022-10-03 ENCOUNTER — TELEPHONE (OUTPATIENT)
Dept: NEUROSURGERY | Facility: CLINIC | Age: 82
End: 2022-10-03

## 2022-10-03 NOTE — TELEPHONE ENCOUNTER
Spoke with Nurse Vaishali ([address] [phone] ext [phone]), patient has not had Xray done  Faxed Req to facility @  [phone]  Vaishali will have mobile X Send images via my email  Vaishali requested I reschedule to give them time to have xray done

## 2022-10-07 ENCOUNTER — TELEPHONE (OUTPATIENT)
Dept: NEUROSURGERY | Facility: CLINIC | Age: 82
End: 2022-10-07

## 2022-11-02 ENCOUNTER — APPOINTMENT (EMERGENCY)
Dept: RADIOLOGY | Facility: HOSPITAL | Age: 82
End: 2022-11-02

## 2022-11-02 ENCOUNTER — HOSPITAL ENCOUNTER (EMERGENCY)
Facility: HOSPITAL | Age: 82
Discharge: NURSING, REHAB OR LONG-TERM CARE | End: 2022-11-02
Attending: SURGERY

## 2022-11-02 ENCOUNTER — APPOINTMENT (EMERGENCY)
Dept: CT IMAGING | Facility: HOSPITAL | Age: 82
End: 2022-11-02

## 2022-11-02 VITALS
TEMPERATURE: 98.2 F | RESPIRATION RATE: 17 BRPM | SYSTOLIC BLOOD PRESSURE: 117 MMHG | OXYGEN SATURATION: 98 % | DIASTOLIC BLOOD PRESSURE: 84 MMHG | WEIGHT: 218.03 LBS | HEART RATE: 81 BPM
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
## 2022-11-02 LAB
BASE EXCESS BLDA CALC-SCNC: 0 MMOL/L (ref -2–3)
CA-I BLD-SCNC: 1.39 MMOL/L (ref 1.12–1.32)
GLUCOSE SERPL-MCNC: 131 MG/DL (ref 65–140)
HCO3 BLDA-SCNC: 25.5 MMOL/L (ref 24–30)
HCT VFR BLD CALC: 45 % (ref 34.8–46.1)
HGB BLDA-MCNC: 15.3 G/DL (ref 11.5–15.4)
PCO2 BLD: 27 MMOL/L (ref 21–32)
PCO2 BLD: 44.6 MM HG (ref 42–50)
PH BLD: 7.37 [PH] (ref 7.3–7.4)
PO2 BLD: 30 MM HG (ref 35–45)
POTASSIUM BLD-SCNC: 3.8 MMOL/L (ref 3.5–5.3)
SAO2 % BLD FROM PO2: 54 % (ref 60–85)
SODIUM BLD-SCNC: 142 MMOL/L (ref 136–145)
SPECIMEN SOURCE: ABNORMAL

## 2022-11-02 NOTE — QUICK NOTE
Cervical Collar Clearance: The patient had a CT scan of the cervical spine demonstrating no acute injury  On exam, the patient had no midline point tenderness or paresthesias/numbness/weakness in the extremities  The patient had full range of motion (was then able to flex, extend, and rotate head laterally) without pain  There were no distracting injuries and the patient was not intoxicated  The patient's cervical spine was cleared radiologically and clinically  Cervical collar removed at this time       Ab Sepulveda  11/2/2022 1:41 PM

## 2022-11-02 NOTE — CASE MANAGEMENT
CM responded to trauma alert  Patient transported to trauma bay by EMS crew  Patient resides at [facility]  Patient responsive to medical team questions and instructions  Per [facility] representative, patient's son [id number]  Cm called and left detailed information for harry Gant  Cm also called son [phone] and harry [phone]  Cm left messages for all sons  Cm provided Trauma AP with contact information  No current identified CM needs  CM will follow and update screening, assessment, and discharge planning as appropriate

## 2022-11-02 NOTE — ED PROVIDER NOTES
Emergency Department Airway Evaluation and Management Form    History  Obtained from: EMS  Patient has no allergy information on record  Chief Complaint   Patient presents with   • Fall     Trauma B Fall on Eliquis    Witnessed +Head strike  HPI     [de-identified] y/o female anticoagulated on eliquis presenting s/p witnessed ground level fall  + headstrike, -LOC  GCS 15  Past Medical History:   Diagnosis Date   • Anxiety    • Cardiomyopathy ([facility] [address] )    • CHF (congestive heart failure) ([facility])    • Dementia (HCC)    • Diabetes mellitus ([facility] [address] )    • GERD (gastroesophageal reflux disease)    • H/O left mastectomy    • Hypertension      No past surgical history on file  No family history on file  I have reviewed and agree with the history as documented  Review of Systems   Please see Trauma Team note for review of systems    Physical Exam  /61   Pulse 81   Temp 98 2 °F (36 8 °C) (Oral)   Resp 18   Wt 98 9 kg (218 lb 0 6 oz)   SpO2 90%     Physical Exam   Airway intact with bilateral breath sounds present  No indication for emergent airway intervention  Please see Trauma Team note for full physical exam    ED Medications  Medications - No data to display    Intubation  Procedures    Notes  I performed a limited evaluation of this patient solely to ensure that the airway was intact prior to trauma surgery evaluation per trauma center ATLS protocol  My examination was focused solely on the airway exam, after which the patient was managed independently by the trauma team  Please see their documentation for full history, ROS, physical exam, and medical decision making         Final Diagnosis  Final diagnoses:   Fall, initial encounter       ED Provider  Electronically Signed by     Sunny Carrel, MD  11/02/22 [phone]

## 2022-11-02 NOTE — ED NOTES
Report called to [facility] and informed on pt discharge home  Provider called and updated pt's son via phone        Pernell Salazar RN  11/02/22 [phone]

## 2022-11-02 NOTE — H&P
H&P - Trauma   Vik Magallon [de-identified] y o  female MRN: [id number]  Unit/Bed#: ED-21 Encounter: [id number]    Trauma Alert: Level B   Model of Arrival: Ambulance    Trauma Team: Attending To and EB Murillo PA-C  Consultants:  No new consultants    Assessment/Plan   Active Problems / Assessment:   1  Ground level fall  2  Coagulopathy secondary to Eliquis  3  Positive head strike     Plan:   - CT scans reveal no acute traumatic finding  - patient will be discharged from the ED  - family updated over the phone and incidental findings discussed over the phone which stable follow-up with PCP  - cervical collar cleared  - may resume home medications  - outpatient follow-up with family doctor    Disposition:  Updated family over the phone  No further workup at this time  Patient may follow-up with PCP  History of Present Illness     Chief Complaint: "No new complaints "  Mechanism:Fall     HPI:    Vik Magallon is a [de-identified] y o  female who presents with status post fall  No new complaints today on presentation  GCS 14  Slight confusion  Alert oriented x2  Pleasant cooperative on evaluation  No new be taking Eliquis  Denies any new pain on presentation  Moving all extremities  Comes from facility in which she is on long-term resident  Review of Systems   Unable to perform ROS: Dementia     12-point, complete review of systems was reviewed and negative except as stated above  Historical Information     Past Medical History:   Diagnosis Date   • Anxiety    • Cardiomyopathy ([address] )    • CHF (congestive heart failure) ([address] )    • Dementia (HCC)    • Diabetes mellitus ([facility] [address] )    • GERD (gastroesophageal reflux disease)    • H/O left mastectomy    • Hypertension      No past surgical history on file  There is no immunization history on file for this patient  Last Tetanus:  Not indicated  Family History: Non-contributory    1   Before the illness or injury that brought you to the Emergency, did you need someone to help you on a regular basis? 1=Yes   2  Since the illness or injury that brought you to the Emergency, have you needed more help than usual to take care of yourself? 1=Yes   3  Have you been hospitalized for one or more nights during the past 6 months (excluding a stay in the Emergency Department)? 1=Yes   4  In general, do you see well? 0=Yes   5  In general, do you have serious problems with your memory? 1=Yes   6  Do you take more than three different medications everyday? 1=Yes   TOTAL   5     Did you order a geriatric consult if the score was 2 or greater?:  Patient being discharged from ED     Meds/Allergies   current meds:   No current facility-administered medications for this encounter  Allergies have not been reviewed; Not on File    Objective   Initial Vitals:   Temperature: 98 2 °F (36 8 °C) (11/02/22 1220)  Pulse: 73 (11/02/22 1220)  Respirations: 18 (11/02/22 1220)  Blood Pressure: 146/80 (11/02/22 1220)    Primary Survey:   Airway:        Status: patent;        Pre-hospital Interventions: none        Hospital Interventions: none  Breathing:        Pre-hospital Interventions: none       Effort: normal       Right breath sounds: normal       Left breath sounds: normal  Circulation:        Rhythm: regular       Rate: regular   Right Pulses Left Pulses    R radial: 2+  R femoral: 2+  R pedal: 2+  R carotid: 2+  R popliteal: 2+ L radial: 2+  L femoral: 2+  L pedal: 2+  L carotid: 2+  L popliteal: 2+   Disability:        GCS: Eye: 4; Verbal: 4 Motor: 6 Total: 14       Right Pupil: round;  reactive         Left Pupil:  round;  reactive      R Motor Strength L Motor Strength    R : 5/5  R dorsiflex: 5/5  R plantarflex: 5/5 L : 5/5  L dorsiflex: 5/5  L plantarflex: 5/5        Sensory:  No sensory deficit  Exposure:       Completed: Yes      Secondary Survey:  Physical Exam  Vitals reviewed  Constitutional:       Appearance: Normal appearance  HENT:      Head: Normocephalic and atraumatic  Right Ear: External ear normal       Left Ear: External ear normal       Nose: Nose normal       Mouth/Throat:      Pharynx: Oropharynx is clear  Eyes:      Pupils: Pupils are equal, round, and reactive to light  Cardiovascular:      Rate and Rhythm: Normal rate and regular rhythm  Pulses: Normal pulses  Heart sounds: Normal heart sounds  Pulmonary:      Effort: Pulmonary effort is normal  No respiratory distress  Breath sounds: Normal breath sounds  Chest:      Chest wall: No tenderness  Abdominal:      General: There is no distension  Palpations: Abdomen is soft  Tenderness: There is no abdominal tenderness  There is no guarding  Musculoskeletal:         General: No swelling or tenderness  Normal range of motion  Cervical back: Normal range of motion  No tenderness  Skin:     General: Skin is warm and dry  Neurological:      General: No focal deficit present  Mental Status: She is alert  Mental status is at baseline  Cranial Nerves: No cranial nerve deficit  Invasive Devices  Report    None               Lab Results:   Results: I have personally reviewed all pertinent laboratory/tests results, BMP/CMP:   Lab Results   Component Value Date    CO2 27 11/02/2022    GLUCOSE 131 11/02/2022    and CBC:   Lab Results   Component Value Date    HGB 15 3 11/02/2022    HCT 45 11/02/2022       Imaging Results: I have personally reviewed pertinent reports      Chest Xray(s): negative for acute findings   FAST exam(s): negative for acute findings   CT Scan(s): negative for acute findings   Additional Xray(s): negative for acute findings     Other Studies: no other studies    Code Status: No Order  Advance Directive and Living Will:      Power of :    POLST:    I have spent 32 minutes with Patient and family today in which greater than 50% of this time was spent in counseling/coordination of care regarding Diagnostic results, Risks and benefits of tx options, Intructions for management, Patient and family education, Importance of tx compliance, Risk factor reductions and Impressions

## 2022-11-02 NOTE — PROCEDURES
POC FAST US    Date/Time: 11/2/2022 1:42 PM  Performed by: Kayla Luciano PA-C  Authorized by: Kayla Luciano PA-C     Patient location:  Trauma  Procedure details:     Exam Type:  Diagnostic    Indications: blunt abdominal trauma and blunt chest trauma      Assess for:  Intra-abdominal fluid and pericardial effusion    Technique: FAST      Views obtained:  Heart - Pericardial sac, LUQ - Splenorenal space, Suprapubic - Pouch of Neptali and RUQ - Pak's Pouch    Image quality: diagnostic      Image availability:  Images available in PACS and video obtained  FAST Findings:     RUQ (Hepatorenal) free fluid: absent      LUQ (Splenorenal) free fluid: absent      Suprapubic free fluid: absent      Cardiac wall motion: identified      Pericardial effusion: absent    Interpretation:     Impressions: negative

## 2022-11-02 NOTE — ED NOTES
[address] to transport pt back to [facility] at [facility] number for transport is [phone]       Orlin Cuadra RN  11/02/22 [id number]

## 2022-11-02 NOTE — INCIDENTAL FINDINGS
The following findings require follow up:  Radiographic finding   Findin  Main pulmonary artery dilated up to 3 9 cm, suggestive of pulmonary arterial hypertension  2  KIDNEYS/URETERS:  2 mm calculus in the left kidney  No ureteral calculi  3  Mild cardiomegaly     Follow up required: Yes   Follow up should be done within 2-4 week(s)    Please notify the following clinician to assist with the follow up:   Primary Care Provider  Discussed with patient's son over the phone and he was able to express verbal understanding of the findings and write them down

## 2022-11-02 NOTE — ED NOTES
[facility] called and set up transport, will call back with time        Jared Jimenez RN  11/02/22 [phone]

## 2022-11-09 ENCOUNTER — IN HOME VISIT (OUTPATIENT)
Dept: GERIATRICS | Facility: OTHER | Age: 82
End: 2022-11-09

## 2022-11-09 DIAGNOSIS — M79.641 RIGHT HAND PAIN: ICD-10-CM

## 2022-11-09 DIAGNOSIS — N20.0 RENAL CALCULUS: ICD-10-CM

## 2022-11-09 DIAGNOSIS — I48.19 PERSISTENT ATRIAL FIBRILLATION (HCC): ICD-10-CM

## 2022-11-09 DIAGNOSIS — R29.6 RECURRENT FALLS: Primary | ICD-10-CM

## 2022-11-09 DIAGNOSIS — I50.22 CHRONIC SYSTOLIC CONGESTIVE HEART FAILURE (HCC): ICD-10-CM

## 2022-11-09 DIAGNOSIS — I28.8 DILATION OF PULMONARY ARTERY (HCC): ICD-10-CM
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
## 2022-11-12 PROBLEM — R29.6 RECURRENT FALLS: Status: ACTIVE | Noted: 2022-07-28

## 2022-11-12 PROBLEM — N20.0 RENAL CALCULUS: Status: ACTIVE | Noted: 2022-11-09

## 2022-11-12 PROBLEM — M79.641 RIGHT HAND PAIN: Status: ACTIVE | Noted: 2022-11-09

## 2022-11-12 PROBLEM — I28.8 DILATION OF PULMONARY ARTERY (HCC): Status: ACTIVE | Noted: 2022-11-09

## 2022-11-12 NOTE — ASSESSMENT & PLAN NOTE
· 2 mm calculus of left kidney with no ureteral calculi seen on CT of abdomen  · Patient is currently asymptomatic  · Will consider Urology consult

## 2022-11-12 NOTE — ASSESSMENT & PLAN NOTE
· Pulmonary artery dilated up to 3 9 cm suggestive of pulmonary arterial hypertension on CT imaging in the ER  · Blood pressures and oxygen saturations stable  · Continue Metoprolol, Lisinopril and Lasix     · Continue Eliquis for anticoagulation  · Maintain optimal bp control

## 2022-11-12 NOTE — ASSESSMENT & PLAN NOTE
· Will order a stat right hand and wrist x-ray  · Continued scheduled Tylenol and prn Oxy-IR for pain  · Recommend ice prn as tolerated

## 2022-11-12 NOTE — ASSESSMENT & PLAN NOTE
· Rate controlled, goal HR < 100  · Continue Metoprolol 50 mg daily  · Continue Eliquis 5 mg 2 times daily for anticoagulation  · Monitor for bleeding due in the setting of high fall risk

## 2022-11-12 NOTE — ASSESSMENT & PLAN NOTE
· Patient euvolemic on examination  · Weight loss noted over the past 2 months  · No sob or increased edema present on examination  · Continue Furosemide 20 mg daily  · Continue metoprolol 50 mg daily  · Continue to monitor for volume overload

## 2022-11-12 NOTE — ASSESSMENT & PLAN NOTE
· Recent fall with head strike  Patient sent to ER for evaluation with no acute abnormalities found  · Continue to encourage walker for ambulation  · PT to evaluate and treat as needed  · Continue fall precautions

## 2023-03-02 DIAGNOSIS — F41.9 ANXIETY: Primary | ICD-10-CM

## 2023-03-02 RX ORDER — LORAZEPAM 0.5 MG/1
0.5 TABLET ORAL 2 TIMES DAILY
Qty: 120 TABLET | Refills: 0 | Status: SHIPPED | OUTPATIENT
Start: 2023-03-02

## 2023-03-07 ENCOUNTER — HOSPITAL ENCOUNTER (EMERGENCY)
Facility: HOSPITAL | Age: 83
Discharge: NURSING, REHAB OR LONG-TERM CARE | End: 2023-03-07
Attending: EMERGENCY MEDICINE

## 2023-03-07 ENCOUNTER — APPOINTMENT (EMERGENCY)
Dept: CT IMAGING | Facility: HOSPITAL | Age: 83
End: 2023-03-07

## 2023-03-07 VITALS
WEIGHT: 225.09 LBS | SYSTOLIC BLOOD PRESSURE: 139 MMHG | RESPIRATION RATE: 16 BRPM | OXYGEN SATURATION: 94 % | WEIGHT: 225.09 LBS | SYSTOLIC BLOOD PRESSURE: 139 MMHG | TEMPERATURE: 96.6 F | TEMPERATURE: 96.6 F | HEART RATE: 70 BPM | OXYGEN SATURATION: 94 % | DIASTOLIC BLOOD PRESSURE: 66 MMHG | RESPIRATION RATE: 16 BRPM | DIASTOLIC BLOOD PRESSURE: 66 MMHG | HEART RATE: 70 BPM
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
## 2023-03-07 LAB
BASE EXCESS BLDA CALC-SCNC: 6 MMOL/L (ref -2–3)
BASE EXCESS BLDA CALC-SCNC: 6 MMOL/L (ref -2–3)
BILIRUB UR QL STRIP: NEGATIVE
BILIRUB UR QL STRIP: NEGATIVE
CA-I BLD-SCNC: 1.28 MMOL/L (ref 1.12–1.32)
CA-I BLD-SCNC: 1.28 MMOL/L (ref 1.12–1.32)
CLARITY UR: CLEAR
CLARITY UR: CLEAR
COLOR UR: ABNORMAL
COLOR UR: ABNORMAL
GLUCOSE SERPL-MCNC: 171 MG/DL (ref 65–140)
GLUCOSE SERPL-MCNC: 171 MG/DL (ref 65–140)
GLUCOSE UR STRIP-MCNC: ABNORMAL MG/DL
GLUCOSE UR STRIP-MCNC: ABNORMAL MG/DL
HCO3 BLDA-SCNC: 31.7 MMOL/L (ref 24–30)
HCO3 BLDA-SCNC: 31.7 MMOL/L (ref 24–30)
HCT VFR BLD CALC: 48 % (ref 34.8–46.1)
HCT VFR BLD CALC: 48 % (ref 34.8–46.1)
HGB BLDA-MCNC: 16.3 G/DL (ref 11.5–15.4)
HGB BLDA-MCNC: 16.3 G/DL (ref 11.5–15.4)
HGB UR QL STRIP.AUTO: NEGATIVE
HGB UR QL STRIP.AUTO: NEGATIVE
KETONES UR STRIP-MCNC: NEGATIVE MG/DL
KETONES UR STRIP-MCNC: NEGATIVE MG/DL
LEUKOCYTE ESTERASE UR QL STRIP: NEGATIVE
LEUKOCYTE ESTERASE UR QL STRIP: NEGATIVE
NITRITE UR QL STRIP: NEGATIVE
NITRITE UR QL STRIP: NEGATIVE
PCO2 BLD: 33 MMOL/L (ref 21–32)
PCO2 BLD: 33 MMOL/L (ref 21–32)
PCO2 BLD: 50.3 MM HG (ref 42–50)
PCO2 BLD: 50.3 MM HG (ref 42–50)
PH BLD: 7.41 [PH] (ref 7.3–7.4)
PH BLD: 7.41 [PH] (ref 7.3–7.4)
PH UR STRIP.AUTO: 7 [PH]
PH UR STRIP.AUTO: 7 [PH]
PO2 BLD: 32 MM HG (ref 35–45)
PO2 BLD: 32 MM HG (ref 35–45)
POTASSIUM BLD-SCNC: 4.9 MMOL/L (ref 3.5–5.3)
POTASSIUM BLD-SCNC: 4.9 MMOL/L (ref 3.5–5.3)
PROT UR STRIP-MCNC: NEGATIVE MG/DL
PROT UR STRIP-MCNC: NEGATIVE MG/DL
SAO2 % BLD FROM PO2: 61 % (ref 60–85)
SAO2 % BLD FROM PO2: 61 % (ref 60–85)
SODIUM BLD-SCNC: 139 MMOL/L (ref 136–145)
SODIUM BLD-SCNC: 139 MMOL/L (ref 136–145)
SP GR UR STRIP.AUTO: 1.02 (ref 1–1.03)
SP GR UR STRIP.AUTO: 1.02 (ref 1–1.03)
SPECIMEN SOURCE: ABNORMAL
SPECIMEN SOURCE: ABNORMAL
UROBILINOGEN UR STRIP-ACNC: <2 MG/DL
UROBILINOGEN UR STRIP-ACNC: <2 MG/DL

## 2023-03-07 NOTE — DISCHARGE INSTRUCTIONS
DISCHARGE INSTRUCTIONS:   Call your local emergency number (911 in the [address] [facility]) or have someone call if:   You have fallen and are unconscious  You have fallen and cannot move part of your body  Call your doctor if:   You have fallen and have pain or a headache  You have questions or concerns about your condition or care

## 2023-03-07 NOTE — ED PROVIDER NOTES
Emergency Department Airway Evaluation and Management Form    History  Obtained from: EMS  Patient unable to give history due to dementia  Patient has no allergy information on record  No chief complaint on file  Patient was an unwitnessed fall at assisted living  She is on Eliquis  Per EMS patient is at baseline  Unsure of head injury  Baseline dementia therefore patient is unable to give adequate history  No past medical history on file  No past surgical history on file  No family history on file  I have reviewed and agree with the history as documented  Review of Systems   Unable to perform ROS: Dementia       Physical Exam  /73   Pulse 71   Temp (!) 96 6 °F (35 9 °C) (Axillary)   Resp 18   Wt 102 kg (225 lb 1 4 oz)   SpO2 91%     Physical Exam  Vitals and nursing note reviewed  Constitutional:       Comments: Confused   HENT:      Head: Normocephalic and atraumatic  Mouth/Throat:      Mouth: Mucous membranes are moist       Comments: Airway patent  Pulmonary:      Effort: Pulmonary effort is normal       Breath sounds: Normal breath sounds  Skin:     General: Skin is warm and dry  Neurological:      Mental Status: She is alert  Comments: C-collar plate was placed in the trauma bay  ED Medications  Medications - No data to display    Intubation  Procedures    Notes  No airway intervention was required  Present for entirety of primary survey      Final Diagnosis  Final diagnoses:   None       ED Provider  Electronically Signed by     Sixto Chang MD  03/07/23 3393

## 2023-03-07 NOTE — H&P
H&P - Trauma   Elvie Mccloud [de-identified] y o  female MRN: [id number]  Unit/Bed#: TR-02 Encounter: [id number]    Trauma Alert: Level B   Model of Arrival: Ambulance    Trauma Team: Attending Kirsten Singletary, Residents John Mederos and CURT [address]  Consultants:     None     Assessment/Plan   Active Problems / Assessment:   Fall, initial encounter     Plan:   CT head and cervical spine negative  C collar cleared  No other traumatic injuries were identified  UA by straight cath to assess for UTI - UA negative  Safe for discharge back to [facility]     History of Present Illness     Chief Complaint: unwitnessed fall  Mechanism:Fall     HPI:    Elvie Mccloud is a [de-identified] y o  female w/ pmhx HTN, presumed a fib on eliquis who presents with fall  Patient had an unwitnessed fall at [facility]  She was found down in her room  Unable to ascertain patient's baseline  Per EMS, when asked if patient was at her baseline, the answer was "she is happy "  Patient unable to answer review of systems  In trauma bay patient is alert, interactive  Review of Systems   Unable to perform ROS: Dementia     12-point, complete review of systems was reviewed and negative except as stated above  Historical Information     No past medical history on file  No past surgical history on file  Unable to obtain history due to Dementia         There is no immunization history on file for this patient  Last Tetanus: n/a  Family History: Non-contributory    1  Before the illness or injury that brought you to the Emergency, did you need someone to help you on a regular basis? 1=Yes   2  Since the illness or injury that brought you to the Emergency, have you needed more help than usual to take care of yourself? 1=Yes   3  Have you been hospitalized for one or more nights during the past 6 months (excluding a stay in the Emergency Department)? 0=No   4  In general, do you see well? 1=No   5   In general, do you have serious problems with your memory? 1=Yes   6  Do you take more than three different medications everyday? 1=Yes   TOTAL   5     Did you order a geriatric consult if the score was 2 or greater?: n/a     Meds/Allergies   all current active meds have been reviewed  Allergies have not been reviewed; Not on File    Objective   Initial Vitals:   Temperature: (!) 96 6 °F (35 9 °C) (03/07/23 0843)  Pulse: 71 (03/07/23 0843)  Respirations: 18 (03/07/23 0843)  Blood Pressure: 163/73 (03/07/23 0843)    Primary Survey:   Airway:        Status: patent;        Pre-hospital Interventions: none        Hospital Interventions: none  Breathing:        Pre-hospital Interventions: none       Effort: normal       Right breath sounds: normal       Left breath sounds: normal  Circulation:        Rhythm:        Rate: regular   Right Pulses Left Pulses    R radial: 2+  R femoral: 2+  R pedal: 2+     L radial: 2+  L femoral: 2+  L pedal: 2+       Disability:        GCS: Eye: 4; Verbal: 5 Motor: 6 Total: 15       Right Pupil: 3 mm;  round;  reactive         Left Pupil:  3 mm;  round;  reactive      R Motor Strength L Motor Strength               Exposure:           Secondary Survey:  Physical Exam  Vitals and nursing note reviewed  Constitutional:       General: She is not in acute distress  Appearance: Normal appearance  She is normal weight  She is not ill-appearing, toxic-appearing or diaphoretic  HENT:      Head: Normocephalic and atraumatic  Eyes:      General: No scleral icterus  Right eye: No discharge  Left eye: No discharge  Extraocular Movements: Extraocular movements intact  Conjunctiva/sclera: Conjunctivae normal       Pupils: Pupils are equal, round, and reactive to light  Cardiovascular:      Rate and Rhythm: Normal rate  Pulses: Normal pulses  Heart sounds: Normal heart sounds  No murmur heard  No friction rub  No gallop  Pulmonary:      Effort: Pulmonary effort is normal  No respiratory distress  Breath sounds: Normal breath sounds  No stridor  No wheezing, rhonchi or rales  Chest:      Chest wall: No tenderness  Abdominal:      General: Abdomen is flat  Bowel sounds are normal  There is no distension  Palpations: Abdomen is soft  There is no mass  Tenderness: There is no abdominal tenderness  There is no guarding or rebound  Hernia: No hernia is present  Musculoskeletal:         General: Normal range of motion  Cervical back: Normal range of motion  Right lower leg: No edema  Left lower leg: No edema  Skin:     General: Skin is warm  Capillary Refill: Capillary refill takes less than 2 seconds  Neurological:      General: No focal deficit present  Mental Status: She is disoriented  Psychiatric:         Mood and Affect: Mood normal          Behavior: Behavior normal          Thought Content: Thought content normal          Judgment: Judgment normal          Invasive Devices     None               Lab Results: Results: I have personally reviewed all pertinent laboratory/tests results    Imaging Results: I have personally reviewed pertinent reports  Chest Xray(s): negative for acute findings   FAST exam(s): negative for acute findings   CT Scan(s): negative for acute findings   Additional Xray(s): negative for acute findings     Other Studies: n/a    Code Status: No Order  Advance Directive and Living Will:      Power of :    POLST:    I have spent 30 minutes with Patient and family today in which greater than 50% of this time was spent in counseling/coordination of care regarding Diagnostic results and Prognosis

## 2023-03-07 NOTE — QUICK NOTE
Called patient's son Aria Wells and notified him that the patient's trauma imaging was negative and urine was clean  I notified him that we plan to send the patient back to [facility] and he was understanding and agreeable to this plan  All of his questions and concerns were answered to his satisfaction

## 2023-03-07 NOTE — PROCEDURES
POC FAST US    Date/Time: 3/7/2023 8:45 AM  Performed by: Josh Castle DO  Authorized by: Josh Castle DO     Patient location:  ED  Procedure details:     Exam Type:  Diagnostic    Indications: blunt abdominal trauma and blunt chest trauma      Assess for:  Hemothorax, pericardial effusion and intra-abdominal fluid    Technique: FAST      Views obtained:  Heart - Pericardial sac, Suprapubic - Pouch of Jacky, RUQ - Stubbs's Pouch and LUQ - Splenorenal space    Image quality: diagnostic      Image availability:  Images available in PACS  FAST Findings:     RUQ (Hepatorenal) free fluid: absent      LUQ (Splenorenal) free fluid: absent      Suprapubic free fluid: absent      Cardiac wall motion: identified      Pericardial effusion: absent    Interpretation:     Impressions: negative

## 2023-03-07 NOTE — CASE MANAGEMENT
CM responded to trauma alert  Patient was transported into trauma bay by [facility]  Patient responsive to medical team's questions and instructions  CM located patients emergency contact from [address] [place] [address] Rd son, Priyank Soni; [phone]  General update given and confirmed number is good for callbacks- son requesting phone updates  Trauma AP aware of above  No current identified CM needs  CM will follow and update screening, assessment, and discharge planning as appropriate

## 2023-03-08 ENCOUNTER — IN HOME VISIT (OUTPATIENT)
Dept: GERIATRICS | Facility: OTHER | Age: 83
End: 2023-03-08

## 2023-03-08 VITALS
RESPIRATION RATE: 20 BRPM | OXYGEN SATURATION: 95 % | HEART RATE: 70 BPM | SYSTOLIC BLOOD PRESSURE: 135 MMHG | DIASTOLIC BLOOD PRESSURE: 70 MMHG | TEMPERATURE: 97.5 F
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
## 2023-03-08 DIAGNOSIS — I50.22 CHRONIC SYSTOLIC CONGESTIVE HEART FAILURE (HCC): ICD-10-CM

## 2023-03-08 DIAGNOSIS — R29.6 RECURRENT FALLS: Primary | ICD-10-CM

## 2023-03-08 DIAGNOSIS — I48.19 PERSISTENT ATRIAL FIBRILLATION (HCC): ICD-10-CM

## 2023-03-08 DIAGNOSIS — I10 ESSENTIAL HYPERTENSION: Chronic | ICD-10-CM

## 2023-03-08 DIAGNOSIS — F02.818 LATE ONSET ALZHEIMER'S DEMENTIA WITH BEHAVIORAL DISTURBANCE (HCC): ICD-10-CM

## 2023-03-08 DIAGNOSIS — G30.1 LATE ONSET ALZHEIMER'S DEMENTIA WITH BEHAVIORAL DISTURBANCE (HCC): ICD-10-CM

## 2023-03-08 NOTE — ASSESSMENT & PLAN NOTE
· History of frequent falls  · Unwitnessed fall on eliquis requiring ED evaluation   · Trauma imaging negative   · No complaints of pain at this time  · Fall precautions   · PT/OT

## 2023-03-08 NOTE — ASSESSMENT & PLAN NOTE
· Currently stable  · Continue lorazepam and zyprexa for behavioral disturbances   · Redirect and reorient  · Continue current level of care in locked memory unit   · Maintain fall precautions   · Support care

## 2023-03-08 NOTE — PROGRESS NOTES
[facility]  [address]  [address] 13    NAME: Belkys Vargas  AGE: [de-identified] y o  SEX: female  : 1940     DATE: 3/8/2023     Assessment and Plan:     Problem List Items Addressed This Visit        Cardiovascular and Mediastinum    Essential hypertension (Chronic)     · BP stable 130's/70   · Continue lisinopril and metoprolol          Chronic systolic congestive heart failure (HCC)     · Euvolemic on exam   · Continue lasix   · Monitor for s/s volume overload         Persistent atrial fibrillation (HCC)     · Rate controlled on metroprolol with HR 70's   · Anticoagulated on eliquis             Nervous and Auditory    Late onset Alzheimer's dementia with behavioral disturbance (HCC)     · Currently stable  · Continue lorazepam and zyprexa for behavioral disturbances   · Redirect and reorient  · Continue current level of care in locked memory unit   · Maintain fall precautions   · Support care             Other    Recurrent falls - Primary     · Continue to encourage walker for ambulation   · PT eval and treat   · Maintain fall precautions          Fall     · History of frequent falls  · Unwitnessed fall on eliquis requiring ED evaluation   · Trauma imaging negative   · No complaints of pain at this time  · Fall precautions   · PT/OT                History of Present Illness:     Patient is an [de-identified] yr old female being seen at [facility] memory unit  She has history of alzheimer's dementia and is therefore a poor historian  She had an unwitnessed fall while on eliquis, unknown whether head strike occurred and was therefore sent to the ED for further evaluation  Trauma imaging workup was found to be negative  She denies any pain at this time  Denies CP or SOB  Nursing without any concerns at this time  Patient is resting comfortable in chair  She currently uses a walker to ambulate         The following portions of the patient's history were reviewed and updated as appropriate: allergies, current medications, past family history, past medical history, past social history, past surgical history and problem list      Review of Systems:     Review of Systems   Constitutional: Negative for appetite change, chills, diaphoresis, fatigue, fever and unexpected weight change  HENT: Negative for drooling, facial swelling, sneezing and voice change  Respiratory: Negative for cough, chest tightness, shortness of breath and wheezing  Cardiovascular: Negative for chest pain and palpitations  Gastrointestinal: Negative for abdominal distention, abdominal pain, nausea and vomiting  Neurological: Negative for dizziness, seizures, speech difficulty, light-headedness and headaches  Psychiatric/Behavioral: Positive for confusion  Negative for agitation and behavioral problems          Problem List:     Patient Active Problem List   Diagnosis   • Chronic systolic congestive heart failure (HCC)   • Persistent atrial fibrillation ([facility])   • Essential hypertension   • NICM (nonischemic cardiomyopathy) ([facility])   • DVT (deep venous thrombosis) ([facility])   • Type 2 diabetes mellitus ([facility])   • GERD (gastroesophageal reflux disease)   • Incomplete RBBB   • Pulmonary emboli ([facility])   • DDD (degenerative disc disease), lumbosacral   • Thoracic or lumbosacral neuritis or radiculitis   • Deep vein thrombosis (DVT) of lower extremity ([facility])   • Esophageal reflux   • Nonischemic cardiomyopathy ([address] )   • Atrial fibrillation ([facility] )   • PE (pulmonary thromboembolism) ([address] )   • Primary osteoarthritis of left knee   • Obesity, morbid ([facility])   • PONV (postoperative nausea and vomiting)   • Low back pain   • Late onset Alzheimer's dementia with behavioral disturbance ([address] )   • Fall   • Recurrent falls   • Closed compression fracture of L2 lumbar vertebra, initial encounter ([facility] )   • Fall from ground level   • Acute pain due to trauma   • History of hypertension   • Paroxysmal atrial fibrillation ([address] ) • Acute pain of left knee   • Right hand pain   • Dilation of pulmonary artery (HCC)   • Renal calculus        Objective:     /70   Pulse 70   Temp 97 5 °F (36 4 °C)   Resp 20   SpO2 95%     Physical Exam  Constitutional:       General: She is not in acute distress  Appearance: She is obese  She is not ill-appearing  HENT:      Head: Normocephalic and atraumatic  Cardiovascular:      Rate and Rhythm: Normal rate  Rhythm irregular  Pulses: Normal pulses  Heart sounds: Normal heart sounds  Pulmonary:      Effort: Pulmonary effort is normal  No respiratory distress  Breath sounds: Normal breath sounds  Abdominal:      General: Bowel sounds are normal  There is no distension  Palpations: Abdomen is soft  Tenderness: There is no abdominal tenderness  Musculoskeletal:      Right lower leg: Edema present  Left lower leg: Edema present  Comments: nonpitting   Skin:     General: Skin is warm and dry  Neurological:      General: No focal deficit present  Mental Status: She is alert  Mental status is at baseline  Psychiatric:         Mood and Affect: Mood normal          Behavior: Behavior normal          Pertinent Laboratory/Diagnostic Studies:    Laboratory Results: I have personally reviewed the pertinent laboratory results/reports       Radiology/Other Diagnostic Testing Results: I have personally reviewed pertinent reports        Ely Marmolejo, [address]   Geriatric Medicine

## 2023-03-16 ENCOUNTER — IN HOME VISIT (OUTPATIENT)
Dept: GERIATRICS | Facility: OTHER | Age: 83
End: 2023-03-16

## 2023-03-16 VITALS
TEMPERATURE: 97.7 F | DIASTOLIC BLOOD PRESSURE: 62 MMHG | HEART RATE: 56 BPM | SYSTOLIC BLOOD PRESSURE: 104 MMHG | OXYGEN SATURATION: 96 % | RESPIRATION RATE: 19 BRPM

## 2023-03-16 DIAGNOSIS — E11.69 TYPE 2 DIABETES MELLITUS WITH OTHER SPECIFIED COMPLICATION, WITHOUT LONG-TERM CURRENT USE OF INSULIN (HCC): Primary | ICD-10-CM

## 2023-03-16 NOTE — PROGRESS NOTES
[address] [facility]  Progress Note  POS: Personal care/13    Unable to obtain from patient due to:  dementia  Chief Complaint/Reason for visit: follow up chronic conditions and multiple falls  History of Present Illness: [de-identified]year old female with a PMH significant for dementia with behavioral disturbances, she resides in the locked memory unit at [facility]  She has ambulatory dysfunction and uses a walker at baseline, she has had multiple falls, last fall on 3/7/23  She was found on the floor and given that she takes eliquis for stroke risk reduction she was taken to ED  Trauma workup was negative at that time; however she was complaining of elbow pain, imaging was negative for any fractures or other acute abnormalities  Medication reviewed, she has had increased behaviors and now she is on scheduled ativan, her zyprexa was also increased, will discuss with psychiatry discontinuation of BZDs, she may required low dose gabapentin to control her behaviors  Review of systems: Review of Systems   Unable to perform ROS: Dementia      Medications: Changes made- see written orders  Consults reviewed: Other  Labs/Diagnostics (reviewed by this provider): Hospital Paperwork    Imaging Reviewed:  X rays     Physical Exam    Temp:97 7 BP:104/62 Pulse:56 Resp:19 O2 Sat:96%    Physical Exam  Vitals reviewed  Constitutional:       General: She is not in acute distress  Appearance: She is obese  She is not ill-appearing, toxic-appearing or diaphoretic  HENT:      Head: Normocephalic  Nose: Nose normal       Mouth/Throat:      Mouth: Mucous membranes are moist    Eyes:      General: No scleral icterus  Cardiovascular:      Rate and Rhythm: Normal rate  Pulmonary:      Effort: No respiratory distress  Musculoskeletal:      Right lower leg: No edema  Left lower leg: No edema  Skin:     General: Skin is warm  Coloration: Skin is not jaundiced     Neurological:      Mental Status: She is alert  Mental status is at baseline  She is disoriented  Psychiatric:         Mood and Affect: Mood normal          Assessment/Plan:  Dementia with behavioral disturbances:  Continue olanzapine and lexapro at current doses  Will discuss discontinuation of scheduled BZDs and keep prn doses  Consider starting low dose gabapentin   Continue supportive treatment in /[facility] memory unit     Ambulatory Dysfunction and recurrent falls:  Most recent fall 3/7/23 was unwitnessed, trauma workup was negative  Patient now complaining of elbow pain, imaging negative for fractures or other acute abnormalities  Encourage use of walker at all times  Continue fall precautions  Multimodal pain regimen: scheduled tylenol, PRN oxycodone, ice/heat therapy, topical voltaren and lidocaine patches  Will discuss discontinuation of BZDs with Psychiatry     Type 2 DM  Controlled on diet, noted mild hyperglycemia on hospital blood work, will repeat BMP and hemoglobin A1C  Consider starting metformin if kidney function remains stable  CKD stage 2   Monitor kidney function  Avoid relative hypotension and nephrotoxins  Ensure adequate hydration     HFrEF  TTE 5/24/06: Systolic function was moderately to markedly reduced  Ejection fraction was estimated in the range of 25 % to 35 %  There was moderate diffuse hypokinesis    Continue metoprolol succinate, lisinopril, furosemide 20 mg daily    Paroxysmal A Fib   Heart rate controlled on metoprolol   Continue eliquis for stroke risk reduction     Symone De León MD  3/16/23

## 2023-03-30 ENCOUNTER — TELEPHONE (OUTPATIENT)
Dept: OTHER | Facility: OTHER | Age: 83
End: 2023-03-30

## 2023-03-30 ENCOUNTER — IN HOME VISIT (OUTPATIENT)
Dept: GERIATRICS | Facility: OTHER | Age: 83
End: 2023-03-30

## 2023-03-30 DIAGNOSIS — I87.2 VENOUS STASIS ULCER OF LEFT CALF LIMITED TO BREAKDOWN OF SKIN WITHOUT VARICOSE VEINS (HCC): ICD-10-CM

## 2023-03-30 DIAGNOSIS — L97.221 VENOUS STASIS ULCER OF LEFT CALF LIMITED TO BREAKDOWN OF SKIN WITHOUT VARICOSE VEINS (HCC): ICD-10-CM

## 2023-03-30 DIAGNOSIS — L03.116 CELLULITIS OF LEFT LOWER EXTREMITY: Primary | ICD-10-CM

## 2023-03-30 DIAGNOSIS — I50.22 CHRONIC SYSTOLIC CONGESTIVE HEART FAILURE (HCC): ICD-10-CM

## 2023-03-30 NOTE — TELEPHONE ENCOUNTER
Kenji Zimmerman called, requesting a call back from on call provider, regarding recent abx order   Provider paged via [facility]

## 2023-04-02 PROBLEM — L97.229 VENOUS STASIS ULCER OF LEFT CALF (HCC): Status: ACTIVE | Noted: 2023-03-30

## 2023-04-02 PROBLEM — L03.116 CELLULITIS OF LEFT LOWER EXTREMITY: Status: ACTIVE | Noted: 2023-03-30

## 2023-04-02 PROBLEM — I83.022 VENOUS STASIS ULCER OF LEFT CALF (HCC): Status: ACTIVE | Noted: 2023-03-30

## 2023-04-03 NOTE — ASSESSMENT & PLAN NOTE
Wt Readings from Last 3 Encounters:   03/07/23 102 kg (225 lb 1 4 oz)   11/02/22 98 9 kg (218 lb 0 6 oz)   09/14/22 104 kg (229 lb 4 5 oz)   · LLE weeping edema with open wounds  · Will order extra dose of Lasix 20 mg PO times 3 days  · Will monitor kidney and electrolyte function

## 2023-04-03 NOTE — ASSESSMENT & PLAN NOTE
· Erythema and warmth noted to LLE on examination with open wounds  · Will order Doxycycline 100 mg PO every 12 hours times 7 days  · Continue to monitor for worsening infection  · Continue local wound care
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
## 2023-04-13 ENCOUNTER — IN HOME VISIT (OUTPATIENT)
Dept: GERIATRICS | Facility: OTHER | Age: 83
End: 2023-04-13

## 2023-04-13 DIAGNOSIS — R26.2 AMBULATORY DYSFUNCTION: ICD-10-CM

## 2023-04-13 DIAGNOSIS — F02.818 LATE ONSET ALZHEIMER'S DEMENTIA WITH BEHAVIORAL DISTURBANCE (HCC): Primary | ICD-10-CM

## 2023-04-13 DIAGNOSIS — R29.6 RECURRENT FALLS: ICD-10-CM

## 2023-04-13 DIAGNOSIS — G30.1 LATE ONSET ALZHEIMER'S DEMENTIA WITH BEHAVIORAL DISTURBANCE (HCC): Primary | ICD-10-CM

## 2023-04-13 NOTE — PROGRESS NOTES
[address] [facility]  Progress Note  POS: Personal [address]    Chief Complaint/Reason for visit: DME visit  History of Present Illness: [de-identified]year old female evaluated for routine follow up of chronic medical conditions as DME visit- form completed for continued personal care services  Recently treated for cellulitis; required PRN lorazepam for increased anxiety and agitation noted around that time  Review of systems: Review of Systems   Unable to perform ROS: Dementia      Medications: No changes made  Labs/Diagnostics (reviewed by this provider): Copy in Chart  BMP, CBC (3/31/23)     Physical Exam    Weight: 212lb Temp:97 6F BP:117/72 Pulse:80 Resp:18    Physical Exam  Vitals and nursing note reviewed  Constitutional:       General: She is awake  She is not in acute distress  Appearance: She is well-developed and well-groomed  She is not toxic-appearing or diaphoretic  HENT:      Head: Normocephalic and atraumatic  Nose: No rhinorrhea  Mouth/Throat:      Mouth: Mucous membranes are moist    Eyes:      General:         Right eye: No discharge  Left eye: No discharge  Pulmonary:      Effort: Pulmonary effort is normal  No respiratory distress  Musculoskeletal:      Cervical back: No rigidity  Skin:     Coloration: Skin is not jaundiced or pale  Neurological:      Mental Status: She is alert  Mental status is at baseline  Cranial Nerves: No facial asymmetry  Psychiatric:         Behavior: Behavior is cooperative           Assessment/Plan:  [de-identified]year old female with:    Late onset Alzheimer's dementia with behavioral disturbance (HCC)  Continue personal care/ secure memory unit for 24/7 care and safety  Supportive care; management of chronic medical conditions as outlined  Delirium precautions  Associated anxiety; psych following and managing medications; recommend further GDR of PRN lorazepam as able    Ambulatory dysfunction  With recurrent falls  Multifactorial    Codey Esquivel DO  4/13/23

## 2023-05-03 PROBLEM — R26.2 AMBULATORY DYSFUNCTION: Status: ACTIVE | Noted: 2023-05-03

## 2023-05-03 NOTE — ASSESSMENT & PLAN NOTE
Continue personal care/ secure memory unit for 24/7 care and safety  Supportive care; management of chronic medical conditions as outlined  Delirium precautions  Associated anxiety; psych following and managing medications; recommend further GDR of PRN lorazepam as able

## 2023-08-06 ENCOUNTER — APPOINTMENT (EMERGENCY)
Dept: CT IMAGING | Facility: HOSPITAL | Age: 83
End: 2023-08-06
Payer: MEDICARE

## 2023-08-06 ENCOUNTER — HOSPITAL ENCOUNTER (EMERGENCY)
Facility: HOSPITAL | Age: 83
Discharge: NURSING, REHAB OR LONG-TERM CARE | End: 2023-08-06
Attending: SURGERY
Payer: MEDICARE

## 2023-08-06 VITALS
OXYGEN SATURATION: 93 % | HEART RATE: 76 BPM | DIASTOLIC BLOOD PRESSURE: 99 MMHG | SYSTOLIC BLOOD PRESSURE: 185 MMHG | TEMPERATURE: 97.7 F | RESPIRATION RATE: 18 BRPM | WEIGHT: 231.7 LBS
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
## 2023-08-06 LAB
ABO GROUP BLD: NORMAL
BASE EXCESS BLDA CALC-SCNC: 5 MMOL/L (ref -2–3)
BLD GP AB SCN SERPL QL: NEGATIVE
CA-I BLD-SCNC: 1.36 MMOL/L (ref 1.12–1.32)
GLUCOSE SERPL-MCNC: 124 MG/DL (ref 65–140)
HCO3 BLDA-SCNC: 31 MMOL/L (ref 24–30)
HCT VFR BLD CALC: 45 % (ref 34.8–46.1)
HGB BLDA-MCNC: 15.3 G/DL (ref 11.5–15.4)
HOLD SPECIMEN: NORMAL
PCO2 BLD: 32 MMOL/L (ref 21–32)
PCO2 BLD: 50.3 MM HG (ref 42–50)
PH BLD: 7.4 [PH] (ref 7.3–7.4)
PO2 BLD: 24 MM HG (ref 35–45)
POTASSIUM BLD-SCNC: 4.8 MMOL/L (ref 3.5–5.3)
RH BLD: POSITIVE
SAO2 % BLD FROM PO2: 42 % (ref 60–85)
SODIUM BLD-SCNC: 142 MMOL/L (ref 136–145)
SPECIMEN EXPIRATION DATE: NORMAL
SPECIMEN SOURCE: ABNORMAL

## 2023-08-06 PROCEDURE — 93308 TTE F-UP OR LMTD: CPT | Performed by: PHYSICIAN ASSISTANT

## 2023-08-06 PROCEDURE — 70450 CT HEAD/BRAIN W/O DYE: CPT

## 2023-08-06 PROCEDURE — 76705 ECHO EXAM OF ABDOMEN: CPT | Performed by: PHYSICIAN ASSISTANT

## 2023-08-06 PROCEDURE — 86900 BLOOD TYPING SEROLOGIC ABO: CPT | Performed by: SURGERY

## 2023-08-06 PROCEDURE — 82330 ASSAY OF CALCIUM: CPT

## 2023-08-06 PROCEDURE — 84295 ASSAY OF SERUM SODIUM: CPT

## 2023-08-06 PROCEDURE — 82803 BLOOD GASES ANY COMBINATION: CPT

## 2023-08-06 PROCEDURE — 36415 COLL VENOUS BLD VENIPUNCTURE: CPT | Performed by: SURGERY

## 2023-08-06 PROCEDURE — EDAIR PR ED AIR: Performed by: EMERGENCY MEDICINE

## 2023-08-06 PROCEDURE — 99285 EMERGENCY DEPT VISIT HI MDM: CPT

## 2023-08-06 PROCEDURE — 86850 RBC ANTIBODY SCREEN: CPT | Performed by: SURGERY

## 2023-08-06 PROCEDURE — 99284 EMERGENCY DEPT VISIT MOD MDM: CPT | Performed by: PHYSICIAN ASSISTANT

## 2023-08-06 PROCEDURE — 84132 ASSAY OF SERUM POTASSIUM: CPT

## 2023-08-06 PROCEDURE — 82947 ASSAY GLUCOSE BLOOD QUANT: CPT

## 2023-08-06 PROCEDURE — 74176 CT ABD & PELVIS W/O CONTRAST: CPT

## 2023-08-06 PROCEDURE — 85014 HEMATOCRIT: CPT

## 2023-08-06 PROCEDURE — 86901 BLOOD TYPING SEROLOGIC RH(D): CPT | Performed by: SURGERY

## 2023-08-06 PROCEDURE — 71250 CT THORAX DX C-: CPT

## 2023-08-06 PROCEDURE — 72125 CT NECK SPINE W/O DYE: CPT

## 2023-08-06 NOTE — QUICK NOTE
Cervical Collar Clearance: The patient had a CT scan of the cervical spine demonstrating no acute injury. On exam, the patient had no midline point tenderness or paresthesias/numbness/weakness in the extremities. The patient had full range of motion (was then able to flex, extend, and rotate head laterally) without pain. There were no distracting injuries and the patient was not intoxicated. The patient's cervical spine was cleared radiologically and clinically. Cervical collar removed at this time.      Thong Cesar, CY  8/6/2023 6:22 PM

## 2023-08-06 NOTE — H&P
H&P - Trauma   Barbara Oconnell 82 y.o. female MRN: [id number]  Unit/Bed#: ED-38 Encounter: [id number]    Trauma Alert: Level B   Model of Arrival: Ambulance    Trauma Team: Attending [address] and AP Frutiger  Consultants:     None     Assessment/Plan   Active Problems / Assessment:   Unwitnessed fall with head pain  Dementia     Plan:   No acute traumatic injuries found on primary/secondary exam.   CT head, cervical spine and C/A/P negative for injuries  Cervical collar cleared  Patient is at her baseline  Discharge back to facility    History of Present Illness     Chief Complaint: "it hurts"  Mechanism:Fall     HPI:    Barbara Oconnell is a 82 y.o. female who presents after an unwitnessed fall at her nursing facility. She was found on the ground and was complaining of pain in her head and all over. She has a history of dementia and history is obtained from EMS. She takes Eliquis. Review of Systems   Constitutional: Negative for activity change, fatigue and fever. HENT: Negative for congestion, ear pain, facial swelling, nosebleeds, postnasal drip, rhinorrhea, sinus pressure, sinus pain, sneezing and sore throat. Respiratory: Negative. Negative for chest tightness, shortness of breath and wheezing. Cardiovascular: Negative for chest pain and palpitations. Gastrointestinal: Negative for abdominal distention, abdominal pain, constipation, diarrhea, nausea and vomiting. Genitourinary: Negative for dysuria, flank pain, hematuria and urgency. Musculoskeletal: Negative for arthralgias, back pain, joint swelling, neck pain and neck stiffness. Skin: Negative for color change, rash and wound. Neurological: Negative for dizziness, seizures, weakness, light-headedness, numbness and headaches. 12-point, complete review of systems was reviewed and negative except as stated above. Historical Information     Past Medical History:   Diagnosis Date   • Dementia ([address])      History reviewed.  No pertinent surgical history. Unable to obtain history due to Dementia    Social History     Tobacco Use   • Smoking status: Never   • Smokeless tobacco: Never   Substance Use Topics   • Alcohol use: Not Currently   • Drug use: Not Currently     Last Tetanus: n/a  Family History: Non-contributory  Meds/Allergies   all current active meds have been reviewed     Allergies   Allergen Reactions   • Celecoxib Hives   • Influenza Vaccines Hives   • Levofloxacin Hives   • Meperidine Hives   • Morphine Hives   • Penicillins Hives   • Percocet [Oxycodone-Acetaminophen] Hives   • Pneumococcal Vaccines Hives   • Tetanus-Diphtheria Toxoids Td Hives       Objective   Initial Vitals:   Temperature: 97.7 °F (36.5 °C) (08/06/23 1640)  Pulse: 58 (08/06/23 1640)  Respirations: 18 (08/06/23 1640)  Blood Pressure: 157/88 (08/06/23 1640)    Primary Survey:   Airway:        Status: patent;        Pre-hospital Interventions: none        Hospital Interventions: none  Breathing:        Pre-hospital Interventions: none       Effort: normal       Right breath sounds: normal       Left breath sounds: normal  Circulation:        Rhythm: regular       Rate: regular   Right Pulses Left Pulses    R radial: 2+  R femoral: 2+  R pedal: 2+     L radial: 2+  L femoral: 2+  L pedal: 2+       Disability:        GCS: Eye: 4; Verbal: 4 Motor: 6 Total: 14       Right Pupil: round;  reactive         Left Pupil:  round;  reactive      R Motor Strength L Motor Strength    R : 5/5  R dorsiflex: 5/5  R plantarflex: 5/5 L : 5/5  L dorsiflex: 5/5  L plantarflex: 5/5        Sensory:  No sensory deficit  Exposure:       Completed: Yes      Secondary Survey:  Physical Exam  Vitals and nursing note reviewed. Constitutional:       General: She is not in acute distress. Appearance: Normal appearance. She is not ill-appearing or toxic-appearing. HENT:      Head: Normocephalic and atraumatic.       Right Ear: Tympanic membrane normal.      Left Ear: Tympanic membrane normal.      Nose: Nose normal. No congestion or rhinorrhea. Mouth/Throat:      Mouth: Mucous membranes are moist.      Pharynx: Oropharynx is clear. No oropharyngeal exudate or posterior oropharyngeal erythema. Eyes:      Extraocular Movements: Extraocular movements intact. Conjunctiva/sclera: Conjunctivae normal.      Pupils: Pupils are equal, round, and reactive to light. Cardiovascular:      Rate and Rhythm: Normal rate and regular rhythm. Heart sounds: No murmur heard. No friction rub. No gallop. Pulmonary:      Effort: Pulmonary effort is normal. No respiratory distress. Breath sounds: No wheezing, rhonchi or rales. Abdominal:      General: There is no distension. Palpations: Abdomen is soft. Tenderness: There is no abdominal tenderness. There is no guarding or rebound. Musculoskeletal:      Right shoulder: Normal.      Left shoulder: Normal.      Right upper arm: Normal.      Left upper arm: Normal.      Right elbow: Normal.      Left elbow: Normal.      Right forearm: Normal.      Left forearm: Normal.      Right wrist: Normal.      Left wrist: Normal.      Right hand: Normal.      Left hand: Normal.      Cervical back: Normal range of motion. Tenderness present. No deformity, signs of trauma or lacerations. Thoracic back: Tenderness present. No deformity or signs of trauma. Lumbar back: Tenderness present. No deformity or signs of trauma. Right hip: Normal. No deformity or tenderness. Normal range of motion. Left hip: Normal.      Right upper leg: No swelling, deformity or tenderness. Left upper leg: Normal. No swelling, deformity or tenderness. Right knee: Normal.      Left knee: Normal.      Right lower leg: Normal.      Left lower leg: Normal.      Right ankle: Normal.      Left ankle: Normal.      Right foot: Normal.      Left foot: Normal.   Skin:     General: Skin is warm and dry.       Capillary Refill: Capillary refill takes less than 2 seconds. Findings: No bruising or lesion. Neurological:      General: No focal deficit present. Mental Status: She is alert and oriented to person, place, and time. Sensory: No sensory deficit. Motor: No weakness. Invasive Devices     Peripheral Intravenous Line  Duration           Peripheral IV 08/06/23 Proximal;Right;Ventral (anterior) Forearm <1 day              Lab Results: I have personally reviewed all pertinent laboratory/test results 08/06/23 and in the preceding 24 hours. Recent Labs     08/06/23  1649   HGB 15.3   HCT 45   CO2 32   CAIONIZED 1.36*       Imaging Results: I have personally reviewed pertinent images saved in PACS. CT scan findings (and other pertinent positive findings on images) were discussed with radiology.  My interpretation of the images/reports are as follows:  Chest Xray(s): negative for acute findings   FAST exam(s): negative for acute findings   CT Scan(s): negative for acute findings   Additional Xray(s): N/A     Other Studies: none    Code Status: No Order  Advance Directive and Living Will:

## 2023-08-06 NOTE — PROCEDURES
POC FAST US    Date/Time: 8/6/2023 6:02 PM    Performed by: Melissa Garcia PA-C  Authorized by: Melissa Garcia PA-C    Patient location:  Trauma  Procedure details:     Exam Type:  Diagnostic    Indications: blunt abdominal trauma and blunt chest trauma      Assess for:  Intra-abdominal fluid and pericardial effusion    Technique: FAST      Views obtained:  Heart - Pericardial sac, LUQ - Splenorenal space, Suprapubic - Pouch of Neptali and RUQ - Pak's Pouch    Image quality: diagnostic      Image availability:  Images available in PACS and video obtained  FAST Findings:     RUQ (Hepatorenal) free fluid: absent      LUQ (Splenorenal) free fluid: absent      Suprapubic free fluid: absent      Cardiac wall motion: identified      Pericardial effusion: absent    Interpretation:     Impressions: negative

## 2023-08-06 NOTE — ED PROVIDER NOTES
Emergency Department Airway Evaluation and Management Form    History  Obtained from:   paramedics  Celecoxib, Influenza vaccines, Levofloxacin, Meperidine, Morphine, Penicillins, Percocet [oxycodone-acetaminophen], Pneumococcal vaccines, and Tetanus-diphtheria toxoids td  Chief Complaint   Patient presents with   • Fall     Arrives via [facility] from [address] [facility] [address] [facility] - unwitnessed fall this afternoon, found on ground. Unknown headstrike/loc. Hx dementia. (+) eliquis. GCS 14. HPI    82-year-old female fall, believes she struck her head, currently on Eliquis    No past medical history on file. No past surgical history on file. No family history on file. I have reviewed and agree with the history as documented. Review of Systems      Physical Exam  /88   Pulse 69   Temp 97.7 °F (36.5 °C) (Tympanic)   Resp 18   Wt 105 kg (231 lb 11.3 oz)   SpO2 95%     Physical Exam  Airway intact clear bilateral breath sounds      ED Medications  Medications - No data to display    Intubation  Procedures    Notes  No acute airway intervention needed. , remainder of care per primary trauma team.       Final Diagnosis  Final diagnoses:   None       ED Provider  Electronically Signed by     Jalen Moscoso DO  08/06/23 3382

## 2023-08-06 NOTE — ED NOTES
Patient attemping to remove c-collar despite redirection and educaiton     Celina Jones RN  08/06/23 3225

## 2023-08-10 ENCOUNTER — IN HOME VISIT (OUTPATIENT)
Dept: GERIATRICS | Facility: OTHER | Age: 83
End: 2023-08-10
Payer: MEDICARE

## 2023-08-10 DIAGNOSIS — I50.22 CHRONIC SYSTOLIC CONGESTIVE HEART FAILURE (HCC): ICD-10-CM

## 2023-08-10 DIAGNOSIS — R30.0 DYSURIA: ICD-10-CM

## 2023-08-10 DIAGNOSIS — I48.21 PERMANENT ATRIAL FIBRILLATION (HCC): ICD-10-CM

## 2023-08-10 DIAGNOSIS — E11.69 TYPE 2 DIABETES MELLITUS WITH OTHER SPECIFIED COMPLICATION, WITHOUT LONG-TERM CURRENT USE OF INSULIN (HCC): Primary | ICD-10-CM

## 2023-08-10 PROCEDURE — 99349 HOME/RES VST EST MOD MDM 40: CPT | Performed by: FAMILY MEDICINE

## 2023-08-10 NOTE — PROGRESS NOTES
[facility]  [place]  POS: Personal Care/13- In Home Visit    NAME: Mathew Shay  AGE: 82 y.o. SEX: female    DATE OF ENCOUNTER: 8/10/23    Assessment and Plan     Type 2 diabetes mellitus (HCC)  A1c 7.4 (March 2023)  Diet controlled; goal A1c <7.5-8 given age and comorbidities  A1c, BMP ordered to be drawn 8/15/23    Chronic systolic congestive heart failure (HCC)  Monitor weights and volume status closely; weight stable within 2 lb over past 4 months, weight gain of 3lb noted June to July, awaiting August wait to trend  Continue metoprolol, lasix, lisinopril    Atrial fibrillation ([address])  Continue metoprolol for rate control  Continue anticoagulation with eliquis (also with chronic PE, DVT)- CBC and BMP ordered to ensure appropriate dose of medication based on renal function    Dysuria  Isolated complaint x1  Watchful waiting, encourage PO fluid intake  Labs ordered as above  Will plan for UA C&S if continued complaint of dysuria, confusion or new behaviors, fever, elevated WBC or abnormal renal function on labs      Chief Complaint     Acute visit- complaint of dysuria x1. Follow up of chronic medical conditions. History of Present Illness     82year old female evaluated for acute visit; with complaint of dysuria- reported only once by patient. No fever, increased urinary frequency reported. Is due for follow up labs for chronic medical conditions as outlined- see A&P. The following portions of the patient's history were reviewed and updated as appropriate: allergies, current medications, past family history, past medical history, past social history, past surgical history and problem list.    Review of Systems     Review of Systems   Unable to perform ROS: Dementia   Constitutional: Negative for fever. Respiratory: Negative for shortness of breath. Gastrointestinal: Negative for abdominal pain. Genitourinary: Positive for dysuria. Active Problem List     Patient Active Problem List   Diagnosis   • Chronic systolic congestive heart failure (HCC)   • Persistent atrial fibrillation (HCC)   • Essential hypertension   • NICM (nonischemic cardiomyopathy) (HCC)   • DVT (deep venous thrombosis) (HCC)   • Type 2 diabetes mellitus (HCC)   • GERD (gastroesophageal reflux disease)   • Incomplete RBBB   • Pulmonary emboli (HCC)   • DDD (degenerative disc disease), lumbosacral   • Thoracic or lumbosacral neuritis or radiculitis   • Deep vein thrombosis (DVT) of lower extremity ([facility])   • Esophageal reflux   • Nonischemic cardiomyopathy (HCC)   • Atrial fibrillation ([address])   • PE (pulmonary thromboembolism) ([address])   • Primary osteoarthritis of left knee   • Obesity, morbid ([facility])   • PONV (postoperative nausea and vomiting)   • Low back pain   • Late onset Alzheimer's dementia with behavioral disturbance ([address])   • Fall   • Recurrent falls   • Closed compression fracture of L2 lumbar vertebra, initial encounter ([address])   • Fall from ground level   • Acute pain due to trauma   • History of hypertension   • Paroxysmal atrial fibrillation ([facility])   • Acute pain of left knee   • Right hand pain   • 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 Constitutional:       General: She is awake. She is not in acute distress. Appearance: She is obese. She is not toxic-appearing or diaphoretic. HENT:      Head: Normocephalic and atraumatic. Nose: No rhinorrhea. Mouth/Throat:      Mouth: Mucous membranes are moist.   Eyes:      General:         Right eye: No discharge. Left eye: No discharge. Pulmonary:      Effort: Pulmonary effort is normal. No respiratory distress. Musculoskeletal:      Cervical back: No rigidity. Right lower leg: Edema (chronic, stable) present. Left lower leg: Edema present. Skin:     Coloration: Skin is not jaundiced or pale. Neurological:      Mental Status: She is alert. Mental status is at baseline. Psychiatric:         Behavior: Behavior is cooperative.        Pertinent Laboratory/Diagnostic Studies:  BMP, CBC (3/15/23)  A1c (3/17/23)    Current Medications     Current Outpatient Medications:   •  acetaminophen (TYLENOL) 325 mg tablet, Take 975 mg by mouth 3 (three) times a day, Disp: , Rfl:   •  apixaban (ELIQUIS) 5 mg, Take 1 tablet (5 mg total) by mouth 2 (two) times a day, Disp: 180 tablet, Rfl: 3  •  atorvastatin (LIPITOR) 10 mg tablet, Take 10 mg by mouth daily, Disp: , Rfl:   •  busPIRone (BUSPAR) 5 mg tablet, Take 5 mg by mouth 2 (two) times a day, Disp: , Rfl:   •  Cholecalciferol (Vitamin D3) 50 MCG (2000 UT) TABS, Take 2,000 Units by mouth daily, Disp: , Rfl:   •  escitalopram (LEXAPRO) 10 mg tablet, Take 10 mg by mouth daily, Disp: , Rfl:   •  famotidine (PEPCID) 20 mg tablet, Take 20 mg by mouth daily, Disp: , Rfl:   •  furosemide (LASIX) 20 mg tablet, Take 20 mg by mouth daily, Disp: , Rfl:   •  Lidocaine HCl (Aspercreme Lidocaine) 4 % CREA, Apply 1 Application topically 3 (three) times a day, Disp: , Rfl:   •  lisinopril (ZESTRIL) 10 mg tablet, Take 10 mg by mouth daily, Disp: , Rfl:   •  LORazepam (Ativan) 0.5 mg tablet, Take 1 tablet (0.5 mg total) by mouth every 8 (eight) hours as needed for anxiety, Disp: 90 tablet, Rfl: 0  •  metoprolol succinate (TOPROL-XL) 50 mg 24 hr tablet, Take 1 tablet (50 mg total) by mouth daily, Disp: 30 tablet, Rfl: 3  •  nystatin (MYCOSTATIN) powder, Apply topically 2 (two) times a day, Disp: , Rfl:   •  OLANZapine (ZyPREXA) 2.5 mg tablet, Take 2.5 mg by mouth 2 (two) times a day, Disp: , Rfl:   •  oxyCODONE (ROXICODONE) 5 immediate release tablet, Take 2.5 mg by mouth every 6 (six) hours as needed for severe pain, Disp: , Rfl:   •  apixaban (ELIQUIS) 5 mg, Take 5 mg by mouth 2 (two) times a day, Disp: , Rfl:     Angella Ballesteros DO  8/10/23

## 2023-09-01 DIAGNOSIS — F41.9 ANXIETY: Primary | ICD-10-CM

## 2023-09-01 RX ORDER — LORAZEPAM 0.5 MG/1
0.5 TABLET ORAL EVERY 8 HOURS PRN
Qty: 90 TABLET | Refills: 0 | Status: SHIPPED | OUTPATIENT
Start: 2023-09-01

## 2023-09-08 PROBLEM — R30.0 DYSURIA: Status: ACTIVE | Noted: 2023-09-08

## 2023-09-08 RX ORDER — BUSPIRONE HYDROCHLORIDE 5 MG/1
5 TABLET ORAL 2 TIMES DAILY
COMMUNITY

## 2023-09-08 RX ORDER — OXYCODONE HYDROCHLORIDE 5 MG/1
2.5 TABLET ORAL EVERY 6 HOURS PRN
COMMUNITY

## 2023-09-08 RX ORDER — LIDOCAINE HCL 4% 4 G/100G
1 CREAM TOPICAL 3 TIMES DAILY
COMMUNITY

## 2023-09-08 NOTE — ASSESSMENT & PLAN NOTE
A1c 7.4 (March 2023)  Diet controlled; goal A1c <7.5-8 given age and comorbidities  A1c, BMP ordered to be drawn 8/15/23

## 2023-09-08 NOTE — ASSESSMENT & PLAN NOTE
Continue metoprolol for rate control  Continue anticoagulation with eliquis (also with chronic PE, DVT)- CBC and BMP ordered to ensure appropriate dose of medication based on renal function

## 2023-09-08 NOTE — ASSESSMENT & PLAN NOTE
Monitor weights and volume status closely; weight stable within 2 lb over past 4 months, weight gain of 3lb noted June to July, awaiting August wait to trend  Continue metoprolol, lasix, lisinopril

## 2023-09-08 NOTE — ASSESSMENT & PLAN NOTE
Isolated complaint x1  Watchful waiting, encourage PO fluid intake  Labs ordered as above  Will plan for UA C&S if continued complaint of dysuria, confusion or new behaviors, fever, elevated WBC or abnormal renal function on labs

## 2023-11-13 ENCOUNTER — TELEPHONE (OUTPATIENT)
Dept: OTHER | Facility: OTHER | Age: 83
End: 2023-11-13

## 2023-11-13 ENCOUNTER — IN HOME VISIT (OUTPATIENT)
Dept: GERIATRICS | Facility: OTHER | Age: 83
End: 2023-11-13

## 2023-11-13 ENCOUNTER — HOSPITAL ENCOUNTER (EMERGENCY)
Facility: HOSPITAL | Age: 83
Discharge: HOME | End: 2023-11-13
Admitting: EMERGENCY MEDICINE

## 2023-11-13 VITALS
HEART RATE: 73 BPM | DIASTOLIC BLOOD PRESSURE: 78 MMHG | TEMPERATURE: 97.3 F | SYSTOLIC BLOOD PRESSURE: 156 MMHG | RESPIRATION RATE: 18 BRPM | WEIGHT: 240.52 LBS | OXYGEN SATURATION: 92 %

## 2023-11-13 DIAGNOSIS — R26.2 AMBULATORY DYSFUNCTION: Primary | ICD-10-CM

## 2023-11-13 DIAGNOSIS — G30.1 LATE ONSET ALZHEIMER'S DEMENTIA WITH BEHAVIORAL DISTURBANCE (HCC): ICD-10-CM
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
## 2023-11-13 DIAGNOSIS — F02.818 LATE ONSET ALZHEIMER'S DEMENTIA WITH BEHAVIORAL DISTURBANCE (HCC): ICD-10-CM

## 2023-11-13 LAB
ANION GAP SERPL CALCULATED.3IONS-SCNC: 6 MMOL/L
ATRIAL RATE: 88 BPM
BASE EXCESS BLDA CALC-SCNC: 6 MMOL/L (ref -2–3)
BUN SERPL-MCNC: 18 MG/DL (ref 5–25)
CA-I BLD-SCNC: 1.1 MMOL/L (ref 1.12–1.32)
CALCIUM SERPL-MCNC: 9.9 MG/DL (ref 8.4–10.2)
CHLORIDE SERPL-SCNC: 104 MMOL/L (ref 96–108)
CO2 SERPL-SCNC: 31 MMOL/L (ref 21–32)
CREAT SERPL-MCNC: 0.81 MG/DL (ref 0.6–1.3)
GLUCOSE SERPL-MCNC: 127 MG/DL (ref 65–140)
GLUCOSE SERPL-MCNC: 128 MG/DL (ref 65–140)
HCO3 BLDA-SCNC: 29.2 MMOL/L (ref 24–30)
HCT VFR BLD CALC: 47 % (ref 36.5–46.1)
HGB BLDA-MCNC: 16 G/DL (ref 12–15.4)
PCO2 BLD: 30 MMOL/L (ref 21–32)
PCO2 BLD: 37.5 MM HG (ref 42–50)
PH BLD: 7.5 [PH] (ref 7.3–7.4)
PO2 BLD: 23 MM HG (ref 35–45)
POTASSIUM BLD-SCNC: >8 MMOL/L (ref 3.5–5.3)
POTASSIUM SERPL-SCNC: 4.4 MMOL/L (ref 3.5–5.3)
QRS AXIS: 17 DEGREES
QRSD INTERVAL: 106 MS
QT INTERVAL: 418 MS
QTC INTERVAL: 441 MS
SAO2 % BLD FROM PO2: 46 % (ref 60–85)
SODIUM BLD-SCNC: 135 MMOL/L (ref 136–145)
SODIUM SERPL-SCNC: 141 MMOL/L (ref 135–147)
SPECIMEN SOURCE: ABNORMAL
T WAVE AXIS: 45 DEGREES
VENTRICULAR RATE: 67 BPM

## 2023-11-13 PROCEDURE — 93005 ELECTROCARDIOGRAM TRACING: CPT

## 2023-11-13 PROCEDURE — 76705 ECHO EXAM OF ABDOMEN: CPT | Performed by: NURSE PRACTITIONER

## 2023-11-13 PROCEDURE — 99285 EMERGENCY DEPT VISIT HI MDM: CPT

## 2023-11-13 PROCEDURE — 82330 ASSAY OF CALCIUM: CPT

## 2023-11-13 PROCEDURE — 99204 OFFICE O/P NEW MOD 45 MIN: CPT | Performed by: SURGERY

## 2023-11-13 PROCEDURE — 84295 ASSAY OF SERUM SODIUM: CPT

## 2023-11-13 PROCEDURE — 93308 TTE F-UP OR LMTD: CPT | Performed by: NURSE PRACTITIONER

## 2023-11-13 PROCEDURE — 82947 ASSAY GLUCOSE BLOOD QUANT: CPT

## 2023-11-13 PROCEDURE — 84132 ASSAY OF SERUM POTASSIUM: CPT

## 2023-11-13 PROCEDURE — 93010 ELECTROCARDIOGRAM REPORT: CPT | Performed by: INTERNAL MEDICINE

## 2023-11-13 PROCEDURE — 80048 BASIC METABOLIC PNL TOTAL CA: CPT | Performed by: NURSE PRACTITIONER

## 2023-11-13 PROCEDURE — 36415 COLL VENOUS BLD VENIPUNCTURE: CPT | Performed by: NURSE PRACTITIONER

## 2023-11-13 PROCEDURE — 85014 HEMATOCRIT: CPT

## 2023-11-13 PROCEDURE — 82803 BLOOD GASES ANY COMBINATION: CPT

## 2023-11-13 NOTE — PROCEDURES
POC FAST US    Date/Time: 11/13/2023 3:24 AM    Performed by: MOISES Ferreira  Authorized by: MOISES Ferreira    Patient location:  Trauma  Procedure details:     Exam Type:  Diagnostic    Indications: blunt abdominal trauma and blunt chest trauma      Assess for:  Intra-abdominal fluid and pericardial effusion    Technique: FAST      Views obtained:  Heart - Pericardial sac, LUQ - Splenorenal space, Suprapubic - Pouch of Neptali and RUQ - Pak's Pouch    Image quality: diagnostic      Image availability:  Images available in PACS and video obtained  FAST Findings:     RUQ (Hepatorenal) free fluid: absent      LUQ (Splenorenal) free fluid: absent      Suprapubic free fluid: absent      Cardiac wall motion: identified      Pericardial effusion: absent    Interpretation:     Impressions: negative

## 2023-11-13 NOTE — H&P
H&P - Trauma   Lela Chaparro 82 y.o. adult MRN: [id number]  Unit/Bed#: ED-19 Encounter: [id number]    Trauma Alert: Level B   Model of Arrival: Ambulance    Trauma Team: Attending Romario Eduardo and AP Jenn Ferguson  Consultants:     None     Assessment/Plan   Active Problems / Assessment:   Fall-no signs of trauma on physical exam.  CT head and C-spine were negative for acute traumatic injury. Plan:   Return back to nursing facility. Follow-up with PCP within 1 week. Follow-up with trauma as needed. History of Present Illness     Chief Complaint: "Leave me alone"  Mechanism:Fall     HPI:    Lela Chaparro is a 82 y.o. adult with history of dementia and atrial fibrillation that she takes Eliquis for, presenting today for evaluation after suffering an assumed fall out of bed in her nursing facility. Patient was found on the ground. Staff said she could have only been on the ground for almost 2 hours given the last time she was seen. No reported signs of trauma. ROS is limited due to dementia. Patient describes wanting to be left alone, otherwise no specific complaints. Review of Systems   Unable to perform ROS: Dementia     12-point, complete review of systems was reviewed and negative except as stated above. Historical Information   Medical history: Anxiety, atrial fibrillation, breast cancer, cardiomyopathy, CHF, colon polyp, dementia, diabetes, GERD, left mastectomy, hypertension  Surgical history: Right knee surgery, splenectomy, left breast surgery, cholecystectomy, hysterectomy, left knee surgery  Social history: No reported alcohol, drug use, nicotine use         There is no immunization history on file for this patient. Last Tetanus: History of allergy to tetanus shot  Family History: Non-contributory     Meds/Allergies   all current active meds have been reviewed  Allergies have not been reviewed;     Allergies   Allergen Reactions    Tetanus-Diphth-Acell Pertussis Other (See Comments)     Unknown Objective   Initial Vitals:   Temperature: (!) 97.3 °F (36.3 °C) (11/13/23 0235)  Pulse: 60 (11/13/23 0235)  Respirations: 18 (11/13/23 0245)  Blood Pressure: 151/95 (11/13/23 0235)    Primary Survey:   Airway:        Status: patent;        Pre-hospital Interventions: none        Hospital Interventions: none  Breathing:        Pre-hospital Interventions: none       Effort: normal       Right breath sounds: normal       Left breath sounds: normal  Circulation:        Rhythm: regular       Rate: regular   Right Pulses Left Pulses    R radial: 2+  R femoral: 2+  R pedal: 2+     L radial: 2+  L femoral: 2+  L pedal: 2+       Disability:        GCS: Eye: 4; Verbal: 5 Motor: 6 Total: 15       Right Pupil: 3 mm;  round;  reactive         Left Pupil:  3 mm;  round;  reactive      R Motor Strength L Motor Strength    R : 5/5  R dorsiflex: 5/5  R plantarflex: 5/5 L : 5/5  L dorsiflex: 5/5  L plantarflex: 5/5        Sensory:  No sensory deficit  Exposure:       Completed: Yes      Secondary Survey:  Physical Exam  Constitutional:       General: Jania Burdick is not in acute distress. Appearance: Jania Burdick is not ill-appearing. HENT:      Head: Normocephalic and atraumatic. Right Ear: External ear normal.      Left Ear: External ear normal.      Nose: Nose normal.      Mouth/Throat:      Mouth: Mucous membranes are moist.      Pharynx: Oropharynx is clear. Eyes:      Extraocular Movements: Extraocular movements intact. Pupils: Pupils are equal, round, and reactive to light. Neck:      Comments: Cervical collar in place. No C-spine tenderness. Cardiovascular:      Rate and Rhythm: Normal rate and regular rhythm. Pulses: Normal pulses. Heart sounds: Normal heart sounds. Pulmonary:      Effort: Pulmonary effort is normal. No respiratory distress. Breath sounds: Normal breath sounds. No stridor. No wheezing, rhonchi or rales. Chest:      Chest wall: No tenderness. Abdominal:      General: Abdomen is flat. Bowel sounds are normal. There is no distension. Palpations: Abdomen is soft. Tenderness: There is no abdominal tenderness. There is no guarding. Genitourinary:     Comments: Pelvis stable  Musculoskeletal:      Cervical back: No tenderness. Comments: Fully ranging all extremities. No extremity tenderness. No C, T, L-spine tenderness. No palpable step-offs. Skin:     General: Skin is warm and dry. Capillary Refill: Capillary refill takes less than 2 seconds. Coloration: Skin is not jaundiced or pale. Findings: No bruising or erythema. Neurological:      General: No focal deficit present. Mental Status: Gabriele Rubi is alert. Mental status is at baseline. Gabriele Rubi is disoriented. Sensory: No sensory deficit. Motor: No weakness. Comments: Patient oriented to self only. Psychiatric:         Speech: Speech normal.         Behavior: Behavior is uncooperative. Invasive Devices       Peripheral Intravenous Line  Duration             Peripheral IV 11/13/23 Right;Ventral (anterior) Hand <1 day                  Lab Results: I have personally reviewed all pertinent laboratory/test results 11/13/23 and in the preceding 24 hours. Recent Labs     11/13/23  0243   HGB 16.0*   HCT 47*   CO2 30   CAIONIZED 1.10*       Imaging Results: I have personally reviewed pertinent images saved in PACS. CT scan findings (and other pertinent positive findings on images) were discussed with radiology. My interpretation of the images/reports are as follows:  Chest Xray(s): N/A   FAST exam(s): negative for acute findings   CT Scan(s): negative for acute findings   Additional Xray(s): N/A     Other Studies: none    Code Status: No Order  Advance Directive and Living Will:      Power of :    POLST:    Cervical Collar Clearance: The patient had a CT scan of the cervical spine demonstrating no acute injury.  On exam, the patient had no midline point tenderness or paresthesias/numbness/weakness in the extremities. The patient had full range of motion (was then able to flex, extend, and rotate head laterally) without pain. There were no distracting injuries and the patient was not intoxicated. The patient's cervical spine was cleared radiologically and clinically. Cervical collar removed at this time.      MOISES Saini  11/13/2023 3:25 AM

## 2023-11-13 NOTE — TELEPHONE ENCOUNTER
Danuta from [facility] wanted to page the on call provider. TC message sent to [address] [place] [address] and call back number provided.
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
## 2023-11-13 NOTE — ED NOTES
Transfer Information:   Ambulance Squad: Bernard Huber Time: [id number]   Destination: [facility]   Accepting Physician:    Report Number:         Don Baca RN  11/13/23 1835

## 2023-11-26 NOTE — ASSESSMENT & PLAN NOTE
With frequent anxious behaviors  Continue current level of care on the personal care locked memory unit. Continue prn lorazepam.  Follow-up with psychiatry   Monitor for delirium in the setting of recent fall with ER visit.

## 2023-11-26 NOTE — ASSESSMENT & PLAN NOTE
With recurrent falls and dementia with behaviors  Recent fall early this am and is s/p ER visit  with no acute abnormalities identified. Continue to monitor neuro-checks and vital signs. Continue supportive care  Maintain fall precautions.

## 2024-01-01 ENCOUNTER — NURSING HOME VISIT (OUTPATIENT)
Dept: GERIATRICS | Facility: OTHER | Age: 84
End: 2024-01-01
Payer: MEDICARE

## 2024-01-01 ENCOUNTER — TELEPHONE (OUTPATIENT)
Dept: OTHER | Facility: OTHER | Age: 84
End: 2024-01-01

## 2024-01-01 DIAGNOSIS — I50.22 CHRONIC SYSTOLIC CONGESTIVE HEART FAILURE (HCC): ICD-10-CM

## 2024-01-01 DIAGNOSIS — Z71.89 GOALS OF CARE, COUNSELING/DISCUSSION: ICD-10-CM

## 2024-01-01 DIAGNOSIS — J10.1 INFLUENZA A: ICD-10-CM

## 2024-01-01 DIAGNOSIS — R53.81 DEBILITY: ICD-10-CM

## 2024-01-01 DIAGNOSIS — L03.115 CELLULITIS OF RIGHT LOWER EXTREMITY: ICD-10-CM

## 2024-01-01 DIAGNOSIS — G93.40 ENCEPHALOPATHY: ICD-10-CM

## 2024-01-01 DIAGNOSIS — F03.90 DEMENTIA (HCC): ICD-10-CM

## 2024-01-01 DIAGNOSIS — I48.20 CHRONIC ATRIAL FIBRILLATION (HCC): ICD-10-CM

## 2024-01-01 DIAGNOSIS — E44.0 MODERATE PROTEIN-CALORIE MALNUTRITION (HCC): ICD-10-CM

## 2024-01-01 DIAGNOSIS — A41.9 SEPSIS, DUE TO UNSPECIFIED ORGANISM, UNSPECIFIED WHETHER ACUTE ORGAN DYSFUNCTION PRESENT (HCC): Primary | ICD-10-CM

## 2024-01-01 DIAGNOSIS — U07.1 COVID-19: ICD-10-CM

## 2024-01-01 PROCEDURE — 99306 1ST NF CARE HIGH MDM 50: CPT | Performed by: FAMILY MEDICINE

## 2024-01-01 RX ORDER — LORAZEPAM 2 MG/ML
0.5 CONCENTRATE ORAL EVERY 8 HOURS PRN
Qty: 15 ML | Refills: 0 | Status: SHIPPED | OUTPATIENT
Start: 2024-01-01 | End: 2024-01-30

## 2024-01-08 ENCOUNTER — IN HOME VISIT (OUTPATIENT)
Dept: GERIATRICS | Facility: OTHER | Age: 84
End: 2024-01-08
Payer: MEDICARE

## 2024-01-08 DIAGNOSIS — L03.115 CELLULITIS OF RIGHT LOWER EXTREMITY: Primary | ICD-10-CM

## 2024-01-08 PROCEDURE — 99348 HOME/RES VST EST LOW MDM 30: CPT | Performed by: NURSE PRACTITIONER
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
## 2024-01-12 ENCOUNTER — IN HOME VISIT (OUTPATIENT)
Dept: GERIATRICS | Facility: OTHER | Age: 84
End: 2024-01-12
Payer: MEDICARE

## 2024-01-12 DIAGNOSIS — R53.1 GENERALIZED WEAKNESS: ICD-10-CM

## 2024-01-12 DIAGNOSIS — L03.115 CELLULITIS OF RIGHT LOWER EXTREMITY: Primary | ICD-10-CM

## 2024-01-12 DIAGNOSIS — G30.1 SEVERE LATE ONSET ALZHEIMER'S DEMENTIA WITH MOOD DISTURBANCE (HCC): ICD-10-CM

## 2024-01-12 DIAGNOSIS — I50.22 CHRONIC SYSTOLIC CONGESTIVE HEART FAILURE (HCC): ICD-10-CM

## 2024-01-12 DIAGNOSIS — F02.C3 SEVERE LATE ONSET ALZHEIMER'S DEMENTIA WITH MOOD DISTURBANCE (HCC): ICD-10-CM

## 2024-01-12 PROCEDURE — 99349 HOME/RES VST EST MOD MDM 40: CPT | Performed by: NURSE PRACTITIONER
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
## 2024-01-13 ENCOUNTER — HOSPITAL ENCOUNTER (INPATIENT)
Facility: HOSPITAL | Age: 84
LOS: 10 days | Discharge: NURSING, REHAB OR LONG-TERM CARE | DRG: 871 | End: 2024-01-23
Attending: EMERGENCY MEDICINE | Admitting: INTERNAL MEDICINE
Payer: MEDICARE

## 2024-01-13 ENCOUNTER — APPOINTMENT (EMERGENCY)
Dept: RADIOLOGY | Facility: HOSPITAL | Age: 84
DRG: 871 | End: 2024-01-13
Payer: MEDICARE

## 2024-01-13 DIAGNOSIS — J10.1 INFLUENZA A: Primary | ICD-10-CM

## 2024-01-13 DIAGNOSIS — Z71.89 GOALS OF CARE, COUNSELING/DISCUSSION: ICD-10-CM

## 2024-01-13 DIAGNOSIS — L03.90 CELLULITIS: ICD-10-CM

## 2024-01-13 DIAGNOSIS — G93.40 ENCEPHALOPATHY: ICD-10-CM

## 2024-01-13 DIAGNOSIS — R29.6 RECURRENT FALLS: ICD-10-CM

## 2024-01-13 DIAGNOSIS — F03.90 DEMENTIA (HCC): ICD-10-CM

## 2024-01-13 PROBLEM — E83.42 HYPOMAGNESEMIA: Status: ACTIVE | Noted: 2024-01-01

## 2024-01-13 PROBLEM — L03.115 CELLULITIS OF RIGHT LOWER EXTREMITY: Status: ACTIVE | Noted: 2024-01-13

## 2024-01-13 PROBLEM — Z85.3 HISTORY OF BREAST CANCER: Status: ACTIVE | Noted: 2024-01-01

## 2024-01-13 PROBLEM — A41.9 SEPSIS (HCC): Status: ACTIVE | Noted: 2024-01-13

## 2024-01-13 PROBLEM — I50.22 CHRONIC SYSTOLIC HEART FAILURE (HCC): Status: ACTIVE | Noted: 2024-01-01

## 2024-01-13 PROBLEM — E78.5 HYPERLIPIDEMIA: Status: ACTIVE | Noted: 2024-01-01

## 2024-01-13 LAB
2HR DELTA HS TROPONIN: -8 NG/L
4HR DELTA HS TROPONIN: -17 NG/L
ALBUMIN SERPL BCP-MCNC: 3.5 G/DL (ref 3.5–5)
ALP SERPL-CCNC: 80 U/L (ref 34–104)
ALT SERPL W P-5'-P-CCNC: 30 U/L (ref 7–52)
ANION GAP SERPL CALCULATED.3IONS-SCNC: 8 MMOL/L
AST SERPL W P-5'-P-CCNC: 50 U/L (ref 13–39)
ATRIAL RATE: 101 BPM
BASOPHILS # BLD AUTO: 0.04 THOUSANDS/ÂΜL (ref 0–0.1)
BASOPHILS NFR BLD AUTO: 1 % (ref 0–1)
BILIRUB DIRECT SERPL-MCNC: 0.15 MG/DL (ref 0–0.2)
BILIRUB SERPL-MCNC: 0.78 MG/DL (ref 0.2–1)
BILIRUB UR QL STRIP: NEGATIVE
BUN SERPL-MCNC: 19 MG/DL (ref 5–25)
CALCIUM SERPL-MCNC: 9.9 MG/DL (ref 8.4–10.2)
CARDIAC TROPONIN I PNL SERPL HS: 28 NG/L
CARDIAC TROPONIN I PNL SERPL HS: 37 NG/L
CARDIAC TROPONIN I PNL SERPL HS: 45 NG/L
CHLORIDE SERPL-SCNC: 101 MMOL/L (ref 96–108)
CLARITY UR: CLEAR
CO2 SERPL-SCNC: 28 MMOL/L (ref 21–32)
COLOR UR: YELLOW
CREAT SERPL-MCNC: 0.91 MG/DL (ref 0.6–1.3)
EOSINOPHIL # BLD AUTO: 0.01 THOUSAND/ÂΜL (ref 0–0.61)
EOSINOPHIL NFR BLD AUTO: 0 % (ref 0–6)
ERYTHROCYTE [DISTWIDTH] IN BLOOD BY AUTOMATED COUNT: 14.5 % (ref 11.6–15.1)
FLUAV RNA RESP QL NAA+PROBE: POSITIVE
FLUBV RNA RESP QL NAA+PROBE: NEGATIVE
GFR SERPL CREATININE-BSD FRML MDRD: 59 ML/MIN/1.73SQ M
GLUCOSE SERPL-MCNC: 92 MG/DL (ref 65–140)
GLUCOSE SERPL-MCNC: 93 MG/DL (ref 65–140)
GLUCOSE SERPL-MCNC: 94 MG/DL (ref 65–140)
GLUCOSE UR STRIP-MCNC: NEGATIVE MG/DL
HCT VFR BLD AUTO: 49.4 % (ref 34.8–46.1)
HGB BLD-MCNC: 16 G/DL (ref 11.5–15.4)
HGB UR QL STRIP.AUTO: NEGATIVE
IMM GRANULOCYTES # BLD AUTO: 0.01 THOUSAND/UL (ref 0–0.2)
IMM GRANULOCYTES NFR BLD AUTO: 0 % (ref 0–2)
KETONES UR STRIP-MCNC: ABNORMAL MG/DL
LACTATE SERPL-SCNC: 1.4 MMOL/L (ref 0.5–2)
LEUKOCYTE ESTERASE UR QL STRIP: NEGATIVE
LYMPHOCYTES # BLD AUTO: 1.53 THOUSANDS/ÂΜL (ref 0.6–4.47)
LYMPHOCYTES NFR BLD AUTO: 32 % (ref 14–44)
MAGNESIUM SERPL-MCNC: 1.6 MG/DL (ref 1.9–2.7)
MCH RBC QN AUTO: 32.2 PG (ref 26.8–34.3)
MCHC RBC AUTO-ENTMCNC: 32.4 G/DL (ref 31.4–37.4)
MCV RBC AUTO: 99 FL (ref 82–98)
MONOCYTES # BLD AUTO: 1.09 THOUSAND/ÂΜL (ref 0.17–1.22)
MONOCYTES NFR BLD AUTO: 23 % (ref 4–12)
NEUTROPHILS # BLD AUTO: 2.11 THOUSANDS/ÂΜL (ref 1.85–7.62)
NEUTS SEG NFR BLD AUTO: 44 % (ref 43–75)
NITRITE UR QL STRIP: NEGATIVE
NRBC BLD AUTO-RTO: 0 /100 WBCS
PH UR STRIP.AUTO: 6 [PH]
PHOSPHATE SERPL-MCNC: 2.6 MG/DL (ref 2.3–4.1)
PLATELET # BLD AUTO: 187 THOUSANDS/UL (ref 149–390)
PMV BLD AUTO: 11.2 FL (ref 8.9–12.7)
POTASSIUM SERPL-SCNC: 3.9 MMOL/L (ref 3.5–5.3)
PROCALCITONIN SERPL-MCNC: 0.07 NG/ML
PROT SERPL-MCNC: 7.3 G/DL (ref 6.4–8.4)
PROT UR STRIP-MCNC: NEGATIVE MG/DL
QRS AXIS: -32 DEGREES
QRSD INTERVAL: 102 MS
QT INTERVAL: 352 MS
QTC INTERVAL: 462 MS
RBC # BLD AUTO: 4.97 MILLION/UL (ref 3.81–5.12)
RSV RNA RESP QL NAA+PROBE: NEGATIVE
SARS-COV-2 RNA RESP QL NAA+PROBE: NEGATIVE
SODIUM SERPL-SCNC: 137 MMOL/L (ref 135–147)
SP GR UR STRIP.AUTO: >=1.03 (ref 1–1.03)
T WAVE AXIS: 43 DEGREES
UROBILINOGEN UR QL STRIP.AUTO: 1 E.U./DL
VENTRICULAR RATE: 104 BPM
WBC # BLD AUTO: 4.79 THOUSAND/UL (ref 4.31–10.16)

## 2024-01-13 PROCEDURE — 83605 ASSAY OF LACTIC ACID: CPT | Performed by: EMERGENCY MEDICINE

## 2024-01-13 PROCEDURE — 80048 BASIC METABOLIC PNL TOTAL CA: CPT | Performed by: EMERGENCY MEDICINE

## 2024-01-13 PROCEDURE — 71045 X-RAY EXAM CHEST 1 VIEW: CPT

## 2024-01-13 PROCEDURE — 84145 PROCALCITONIN (PCT): CPT | Performed by: INTERNAL MEDICINE

## 2024-01-13 PROCEDURE — 99223 1ST HOSP IP/OBS HIGH 75: CPT | Performed by: INTERNAL MEDICINE

## 2024-01-13 PROCEDURE — 87086 URINE CULTURE/COLONY COUNT: CPT | Performed by: EMERGENCY MEDICINE

## 2024-01-13 PROCEDURE — 0241U HB NFCT DS VIR RESP RNA 4 TRGT: CPT | Performed by: EMERGENCY MEDICINE

## 2024-01-13 PROCEDURE — 36415 COLL VENOUS BLD VENIPUNCTURE: CPT | Performed by: EMERGENCY MEDICINE

## 2024-01-13 PROCEDURE — 83735 ASSAY OF MAGNESIUM: CPT | Performed by: EMERGENCY MEDICINE

## 2024-01-13 PROCEDURE — 96361 HYDRATE IV INFUSION ADD-ON: CPT

## 2024-01-13 PROCEDURE — 96365 THER/PROPH/DIAG IV INF INIT: CPT

## 2024-01-13 PROCEDURE — 82948 REAGENT STRIP/BLOOD GLUCOSE: CPT

## 2024-01-13 PROCEDURE — 84100 ASSAY OF PHOSPHORUS: CPT | Performed by: EMERGENCY MEDICINE

## 2024-01-13 PROCEDURE — 81003 URINALYSIS AUTO W/O SCOPE: CPT | Performed by: EMERGENCY MEDICINE

## 2024-01-13 PROCEDURE — 80076 HEPATIC FUNCTION PANEL: CPT | Performed by: EMERGENCY MEDICINE

## 2024-01-13 PROCEDURE — 87040 BLOOD CULTURE FOR BACTERIA: CPT | Performed by: EMERGENCY MEDICINE

## 2024-01-13 PROCEDURE — 93005 ELECTROCARDIOGRAM TRACING: CPT

## 2024-01-13 PROCEDURE — 85025 COMPLETE CBC W/AUTO DIFF WBC: CPT | Performed by: EMERGENCY MEDICINE

## 2024-01-13 PROCEDURE — 84484 ASSAY OF TROPONIN QUANT: CPT | Performed by: EMERGENCY MEDICINE

## 2024-01-13 PROCEDURE — 99285 EMERGENCY DEPT VISIT HI MDM: CPT

## 2024-01-13 PROCEDURE — 96375 TX/PRO/DX INJ NEW DRUG ADDON: CPT

## 2024-01-13 RX ORDER — INSULIN LISPRO 100 [IU]/ML
1-6 INJECTION, SOLUTION INTRAVENOUS; SUBCUTANEOUS
Status: DISCONTINUED | OUTPATIENT
Start: 2024-01-13 | End: 2024-01-20

## 2024-01-13 RX ORDER — FAMOTIDINE 20 MG/1
20 TABLET, FILM COATED ORAL DAILY
Status: DISCONTINUED | OUTPATIENT
Start: 2024-01-14 | End: 2024-01-20

## 2024-01-13 RX ORDER — OLANZAPINE 2.5 MG/1
2.5 TABLET, FILM COATED ORAL DAILY
Status: DISCONTINUED | OUTPATIENT
Start: 2024-01-14 | End: 2024-01-18

## 2024-01-13 RX ORDER — METOPROLOL SUCCINATE 50 MG/1
50 TABLET, EXTENDED RELEASE ORAL DAILY
Status: DISCONTINUED | OUTPATIENT
Start: 2024-01-14 | End: 2024-01-20

## 2024-01-13 RX ORDER — ESCITALOPRAM OXALATE 10 MG/1
10 TABLET ORAL DAILY
Status: DISCONTINUED | OUTPATIENT
Start: 2024-01-14 | End: 2024-01-18

## 2024-01-13 RX ORDER — MAGNESIUM SULFATE HEPTAHYDRATE 40 MG/ML
2 INJECTION, SOLUTION INTRAVENOUS ONCE
Status: COMPLETED | OUTPATIENT
Start: 2024-01-13 | End: 2024-01-13

## 2024-01-13 RX ORDER — FUROSEMIDE 20 MG/1
20 TABLET ORAL DAILY
Status: DISCONTINUED | OUTPATIENT
Start: 2024-01-14 | End: 2024-01-17

## 2024-01-13 RX ORDER — HYDRALAZINE HYDROCHLORIDE 20 MG/ML
5 INJECTION INTRAMUSCULAR; INTRAVENOUS EVERY 6 HOURS PRN
Status: DISCONTINUED | OUTPATIENT
Start: 2024-01-13 | End: 2024-01-20

## 2024-01-13 RX ORDER — OXYCODONE HYDROCHLORIDE 5 MG/1
5 TABLET ORAL EVERY 6 HOURS PRN
Status: DISCONTINUED | OUTPATIENT
Start: 2024-01-13 | End: 2024-01-20

## 2024-01-13 RX ORDER — CEFEPIME HYDROCHLORIDE 2 G/50ML
2000 INJECTION, SOLUTION INTRAVENOUS ONCE
Status: COMPLETED | OUTPATIENT
Start: 2024-01-13 | End: 2024-01-13

## 2024-01-13 RX ORDER — ATORVASTATIN CALCIUM 10 MG/1
10 TABLET, FILM COATED ORAL
Status: DISCONTINUED | OUTPATIENT
Start: 2024-01-13 | End: 2024-01-20

## 2024-01-13 RX ORDER — LORAZEPAM 0.5 MG/1
0.5 TABLET ORAL EVERY 8 HOURS PRN
Status: DISCONTINUED | OUTPATIENT
Start: 2024-01-13 | End: 2024-01-20

## 2024-01-13 RX ORDER — CEFAZOLIN SODIUM 2 G/50ML
2000 SOLUTION INTRAVENOUS EVERY 8 HOURS
Status: DISCONTINUED | OUTPATIENT
Start: 2024-01-13 | End: 2024-01-13

## 2024-01-13 RX ORDER — LORAZEPAM 2 MG/ML
0.5 INJECTION INTRAMUSCULAR ONCE
Status: COMPLETED | OUTPATIENT
Start: 2024-01-13 | End: 2024-01-13

## 2024-01-13 RX ORDER — CEFAZOLIN SODIUM 2 G/50ML
2000 SOLUTION INTRAVENOUS EVERY 8 HOURS
Status: DISCONTINUED | OUTPATIENT
Start: 2024-01-13 | End: 2024-01-17

## 2024-01-13 RX ORDER — OSELTAMIVIR PHOSPHATE 75 MG/1
75 CAPSULE ORAL EVERY 12 HOURS SCHEDULED
Status: DISPENSED | OUTPATIENT
Start: 2024-01-13 | End: 2024-01-18

## 2024-01-13 RX ORDER — MELATONIN
2000 DAILY
Status: DISCONTINUED | OUTPATIENT
Start: 2024-01-14 | End: 2024-01-20

## 2024-01-13 RX ORDER — OXYCODONE HYDROCHLORIDE 5 MG/1
2.5 TABLET ORAL EVERY 6 HOURS PRN
Status: DISCONTINUED | OUTPATIENT
Start: 2024-01-13 | End: 2024-01-20

## 2024-01-13 RX ORDER — GUAIFENESIN/DEXTROMETHORPHAN 100-10MG/5
10 SYRUP ORAL EVERY 4 HOURS PRN
Status: DISCONTINUED | OUTPATIENT
Start: 2024-01-13 | End: 2024-01-17

## 2024-01-13 RX ORDER — LISINOPRIL 10 MG/1
10 TABLET ORAL DAILY
Status: DISCONTINUED | OUTPATIENT
Start: 2024-01-14 | End: 2024-01-20

## 2024-01-13 RX ORDER — BUSPIRONE HYDROCHLORIDE 5 MG/1
5 TABLET ORAL 2 TIMES DAILY
Status: DISCONTINUED | OUTPATIENT
Start: 2024-01-13 | End: 2024-01-17

## 2024-01-13 RX ORDER — ACETAMINOPHEN 325 MG/1
650 TABLET ORAL EVERY 6 HOURS PRN
Status: DISCONTINUED | OUTPATIENT
Start: 2024-01-13 | End: 2024-01-20

## 2024-01-13 RX ADMIN — LORAZEPAM 0.5 MG: 2 INJECTION INTRAMUSCULAR; INTRAVENOUS at 16:23

## 2024-01-13 RX ADMIN — CEFEPIME HYDROCHLORIDE 2000 MG: 2 INJECTION, SOLUTION INTRAVENOUS at 15:13

## 2024-01-13 RX ADMIN — SODIUM CHLORIDE 500 ML: 0.9 INJECTION, SOLUTION INTRAVENOUS at 15:13

## 2024-01-13 RX ADMIN — MAGNESIUM SULFATE HEPTAHYDRATE 2 G: 40 INJECTION, SOLUTION INTRAVENOUS at 17:42

## 2024-01-13 RX ADMIN — CEFAZOLIN SODIUM 2000 MG: 2 SOLUTION INTRAVENOUS at 21:38

## 2024-01-13 NOTE — ASSESSMENT & PLAN NOTE
Given a dose of IV Cefepime in the ED -> de-escalated to IV Ancef  Check right lower extremity venous duplex study to rule out underlying DVT  PRN pain control
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
## 2024-01-13 NOTE — ASSESSMENT & PLAN NOTE
Presents with tachycardia/tachypnea and nursing home reports of recurrent fevers over the last few days  Etiology suspected to be secondary to a small developing right lower extremity lightheadedness coupled with influenza A (plan for individual assessments)  Monitor vitals and maintain hemodynamics - keep MAP > 65 -> currently at goal, and in the setting of chronic systolic CHF, IV fluid resuscitation not indicated  Blood cultures drawn in the ED - urinalysis negative for infection - CXR without focal infiltrates but mild bilateral reticular-type opacities vs vascular congestion (known CHF history)  Lactic acid normal - procalcitonin pending

## 2024-01-13 NOTE — TELEPHONE ENCOUNTER
Cynthia from [facility] called to report that she notice new symptoms on the patient. She would like some advice regarding wake her up to give the patient her medication.     Page the on call provider via TC

## 2024-01-13 NOTE — ASSESSMENT & PLAN NOTE
Last EF of 20-25% in October 2021  On diuresis with Lasix and beta-blockade with Toprol-XL  Low-sodium diet with fluid restriction enforced  Monitor/replete serum potassium/magnesium as necessary

## 2024-01-13 NOTE — ASSESSMENT & PLAN NOTE
Lab Results   Component Value Date    HGBA1C 7.4 (H) 03/17/2023     Diet controlled at home  Initiate SSI coverage per Accu-Cheks while hospitalized  Carbohydrate restriction  Hypoglycemia protocol

## 2024-01-14 PROBLEM — R53.1 GENERALIZED WEAKNESS: Status: ACTIVE | Noted: 2024-01-01

## 2024-01-14 LAB
ALBUMIN SERPL BCP-MCNC: 3.1 G/DL (ref 3.5–5)
ALP SERPL-CCNC: 69 U/L (ref 34–104)
ALT SERPL W P-5'-P-CCNC: 26 U/L (ref 7–52)
ANION GAP SERPL CALCULATED.3IONS-SCNC: 7 MMOL/L
AST SERPL W P-5'-P-CCNC: 48 U/L (ref 13–39)
BASOPHILS # BLD AUTO: 0.04 THOUSANDS/ÂΜL (ref 0–0.1)
BASOPHILS NFR BLD AUTO: 1 % (ref 0–1)
BILIRUB SERPL-MCNC: 0.66 MG/DL (ref 0.2–1)
BUN SERPL-MCNC: 17 MG/DL (ref 5–25)
CALCIUM ALBUM COR SERPL-MCNC: 10.1 MG/DL (ref 8.3–10.1)
CALCIUM SERPL-MCNC: 9.4 MG/DL (ref 8.4–10.2)
CHLORIDE SERPL-SCNC: 103 MMOL/L (ref 96–108)
CO2 SERPL-SCNC: 28 MMOL/L (ref 21–32)
CREAT SERPL-MCNC: 0.77 MG/DL (ref 0.6–1.3)
EOSINOPHIL # BLD AUTO: 0.04 THOUSAND/ÂΜL (ref 0–0.61)
EOSINOPHIL NFR BLD AUTO: 1 % (ref 0–6)
ERYTHROCYTE [DISTWIDTH] IN BLOOD BY AUTOMATED COUNT: 14.2 % (ref 11.6–15.1)
GFR SERPL CREATININE-BSD FRML MDRD: 72 ML/MIN/1.73SQ M
GLUCOSE SERPL-MCNC: 103 MG/DL (ref 65–140)
GLUCOSE SERPL-MCNC: 79 MG/DL (ref 65–140)
GLUCOSE SERPL-MCNC: 86 MG/DL (ref 65–140)
GLUCOSE SERPL-MCNC: 93 MG/DL (ref 65–140)
GLUCOSE SERPL-MCNC: 97 MG/DL (ref 65–140)
HCT VFR BLD AUTO: 47.7 % (ref 34.8–46.1)
HGB BLD-MCNC: 15.5 G/DL (ref 11.5–15.4)
IMM GRANULOCYTES # BLD AUTO: 0.02 THOUSAND/UL (ref 0–0.2)
IMM GRANULOCYTES NFR BLD AUTO: 0 % (ref 0–2)
LYMPHOCYTES # BLD AUTO: 1.62 THOUSANDS/ÂΜL (ref 0.6–4.47)
LYMPHOCYTES NFR BLD AUTO: 35 % (ref 14–44)
MAGNESIUM SERPL-MCNC: 1.7 MG/DL (ref 1.9–2.7)
MCH RBC QN AUTO: 32.4 PG (ref 26.8–34.3)
MCHC RBC AUTO-ENTMCNC: 32.5 G/DL (ref 31.4–37.4)
MCV RBC AUTO: 100 FL (ref 82–98)
MONOCYTES # BLD AUTO: 0.78 THOUSAND/ÂΜL (ref 0.17–1.22)
MONOCYTES NFR BLD AUTO: 17 % (ref 4–12)
NEUTROPHILS # BLD AUTO: 2.08 THOUSANDS/ÂΜL (ref 1.85–7.62)
NEUTS SEG NFR BLD AUTO: 46 % (ref 43–75)
NRBC BLD AUTO-RTO: 0 /100 WBCS
PLATELET # BLD AUTO: 184 THOUSANDS/UL (ref 149–390)
PMV BLD AUTO: 11 FL (ref 8.9–12.7)
POTASSIUM SERPL-SCNC: 4.2 MMOL/L (ref 3.5–5.3)
PROCALCITONIN SERPL-MCNC: 0.06 NG/ML
PROT SERPL-MCNC: 6.3 G/DL (ref 6.4–8.4)
RBC # BLD AUTO: 4.78 MILLION/UL (ref 3.81–5.12)
SODIUM SERPL-SCNC: 138 MMOL/L (ref 135–147)
WBC # BLD AUTO: 4.58 THOUSAND/UL (ref 4.31–10.16)

## 2024-01-14 PROCEDURE — 84145 PROCALCITONIN (PCT): CPT | Performed by: INTERNAL MEDICINE

## 2024-01-14 PROCEDURE — 99232 SBSQ HOSP IP/OBS MODERATE 35: CPT | Performed by: INTERNAL MEDICINE

## 2024-01-14 PROCEDURE — 82948 REAGENT STRIP/BLOOD GLUCOSE: CPT

## 2024-01-14 PROCEDURE — 80053 COMPREHEN METABOLIC PANEL: CPT | Performed by: INTERNAL MEDICINE

## 2024-01-14 PROCEDURE — 83735 ASSAY OF MAGNESIUM: CPT | Performed by: INTERNAL MEDICINE

## 2024-01-14 PROCEDURE — 85025 COMPLETE CBC W/AUTO DIFF WBC: CPT | Performed by: INTERNAL MEDICINE

## 2024-01-14 RX ORDER — MAGNESIUM SULFATE HEPTAHYDRATE 40 MG/ML
2 INJECTION, SOLUTION INTRAVENOUS ONCE
Status: COMPLETED | OUTPATIENT
Start: 2024-01-14 | End: 2024-01-14

## 2024-01-14 RX ORDER — OLANZAPINE 10 MG/2ML
2.5 INJECTION, POWDER, FOR SOLUTION INTRAMUSCULAR ONCE
Status: DISCONTINUED | OUTPATIENT
Start: 2024-01-14 | End: 2024-01-14

## 2024-01-14 RX ORDER — OLANZAPINE 10 MG/2ML
5 INJECTION, POWDER, FOR SOLUTION INTRAMUSCULAR ONCE
Status: COMPLETED | OUTPATIENT
Start: 2024-01-14 | End: 2024-01-14

## 2024-01-14 RX ORDER — WATER 10 ML/10ML
INJECTION INTRAMUSCULAR; INTRAVENOUS; SUBCUTANEOUS
Status: COMPLETED
Start: 2024-01-14 | End: 2024-01-14

## 2024-01-14 RX ADMIN — APIXABAN 5 MG: 5 TABLET, FILM COATED ORAL at 10:10

## 2024-01-14 RX ADMIN — ESCITALOPRAM OXALATE 10 MG: 10 TABLET ORAL at 10:10

## 2024-01-14 RX ADMIN — LISINOPRIL 10 MG: 10 TABLET ORAL at 10:10

## 2024-01-14 RX ADMIN — CEFAZOLIN SODIUM 2000 MG: 2 SOLUTION INTRAVENOUS at 21:38

## 2024-01-14 RX ADMIN — OLANZAPINE 2.5 MG: 2.5 TABLET, FILM COATED ORAL at 10:09

## 2024-01-14 RX ADMIN — BUSPIRONE HYDROCHLORIDE 5 MG: 5 TABLET ORAL at 21:38

## 2024-01-14 RX ADMIN — BUSPIRONE HYDROCHLORIDE 5 MG: 5 TABLET ORAL at 10:10

## 2024-01-14 RX ADMIN — FAMOTIDINE 20 MG: 20 TABLET ORAL at 10:10

## 2024-01-14 RX ADMIN — OLANZAPINE 5 MG: 10 INJECTION, POWDER, FOR SOLUTION INTRAMUSCULAR at 18:51

## 2024-01-14 RX ADMIN — ATORVASTATIN CALCIUM 10 MG: 10 TABLET, FILM COATED ORAL at 17:42

## 2024-01-14 RX ADMIN — APIXABAN 5 MG: 5 TABLET, FILM COATED ORAL at 17:42

## 2024-01-14 RX ADMIN — WATER 2.1 ML: 1 INJECTION INTRAMUSCULAR; INTRAVENOUS; SUBCUTANEOUS at 01:44

## 2024-01-14 RX ADMIN — OSELTAMIVIR PHOSPHATE 75 MG: 75 CAPSULE ORAL at 21:38

## 2024-01-14 RX ADMIN — METOPROLOL SUCCINATE 50 MG: 50 TABLET, EXTENDED RELEASE ORAL at 10:10

## 2024-01-14 RX ADMIN — CEFAZOLIN SODIUM 2000 MG: 2 SOLUTION INTRAVENOUS at 05:39

## 2024-01-14 RX ADMIN — OLANZAPINE 5 MG: 10 INJECTION, POWDER, FOR SOLUTION INTRAMUSCULAR at 01:44

## 2024-01-14 RX ADMIN — WATER 2.1 ML: 1 INJECTION INTRAMUSCULAR; INTRAVENOUS; SUBCUTANEOUS at 18:51

## 2024-01-14 RX ADMIN — CEFAZOLIN SODIUM 2000 MG: 2 SOLUTION INTRAVENOUS at 14:13

## 2024-01-14 RX ADMIN — FUROSEMIDE 20 MG: 20 TABLET ORAL at 10:10

## 2024-01-14 RX ADMIN — OSELTAMIVIR PHOSPHATE 75 MG: 75 CAPSULE ORAL at 10:10

## 2024-01-14 RX ADMIN — MAGNESIUM SULFATE HEPTAHYDRATE 2 G: 40 INJECTION, SOLUTION INTRAVENOUS at 17:42

## 2024-01-14 RX ADMIN — Medication 2000 UNITS: at 10:10

## 2024-01-14 NOTE — PROGRESS NOTES
[facility]  Progress Note  Name: Tracy Rock I  MRN: [id number]  Unit/Bed#: -01 I Date of Admission: 1/13/2024   Date of Service: 1/14/2024 I Hospital Day: 1      Assessment & Plan:    * Sepsis (HCC)  Assessment & Plan  Presents with tachycardia/tachypnea and nursing home reports of recurrent fevers over the last few days  Etiology suspected to be secondary to a small developing right lower extremity lightheadedness coupled with influenza A (plan for individual assessments)  Monitor vitals and maintain hemodynamics - keep MAP > 65 -> currently at goal, and in the setting of chronic systolic CHF, IV fluid resuscitation not indicated  Blood cultures drawn in the ED - urinalysis negative for infection - CXR, on personal review, without focal infiltrates but mild bilateral reticular-type opacities vs vascular congestion (known CHF history)  Lactic acid normal - procalcitonin negative x 2     Influenza A  Assessment & Plan  Contact/droplet precautions  Initiate Tamiflu  Supportive care  Maintain oxygenation    Cellulitis of right lower extremity  Assessment & Plan  Given a dose of IV Cefepime in the ED -> de-escalated to IV Ancef  Pending right lower extremity venous duplex study to rule out underlying DVT  PRN pain control    Atrial fibrillation (HCC)  Assessment & Plan  Chronic rate controlled with Toprol-XL  On Eliquis for anticoagulation  [id number] score of 8    Chronic systolic heart failure (HCC)  Assessment & Plan  Last EF of 20-25% in October 2021  On diuresis with Lasix and beta-blockade with Toprol-XL  Low-sodium diet with fluid restriction enforced  Monitor/replete serum potassium/magnesium as necessary    Hypomagnesemia  Assessment & Plan  Monitor/replete serum magnesium and potassium    Dementia (HCC)  Assessment & Plan  Supportive care  Continue Buspar/Lexapro/Zyprexa and PRN Ativan  Required a transient course of restraints overnight    Essential  hypertension  Assessment & Plan  Low sodium restriction  Continue Zestril/Toprol-XL - additional PRN IV Hydralazine on board    GERD (gastroesophageal reflux disease)  Assessment & Plan  Continue Pepcid    Hyperlipidemia  Assessment & Plan  Continue statin    Type 2 diabetes mellitus (HCC)  Assessment & Plan  Lab Results   Component Value Date    HGBA1C 7.4 (H) 2023     Diet controlled at home  Continue SSI coverage per Accu-Cheks while hospitalized  Carbohydrate restriction  Hypoglycemia protocol    History of breast cancer  Assessment & Plan  Status post left mastectomy  Outpatient follow-up      DVT Prophylaxis: Eliquis    AM-PAC Basic Mobility:  Basic Mobility Inpatient Raw Score: 6  -HL Achieved: 2: Bed activities/Dependent transfer  -HLM Goal: 2: Bed activities/Dependent transfer    Patient Centered Rounds:  I have performed bedside rounds and discussed plan of care with nursing today.  Discussions with Specialists or Other Care Team Provider:  see above assessments if applicable    Education and Discussions with Family / Patient:  Updated son, Ghassan, over the phone today    Time Spent for Care:  35 minutes. More than 50% of total time spent on counseling and coordination of care, on one or more of the following: performing physical exam; counseling and coordination of care, obtaining or reviewing history, documenting in the medical record, reviewing/ordering tests/medications/procedures, and communicating with other healthcare professionals.    Current Length of Stay: 1 day(s)  Current Patient Status: Inpatient   Certification Statement:  Patient will continue to require additional hospital stay due to assessments as noted above.    Code Status: Level 1 - Full Code        Subjective:     Encountered earlier in the morning.  Resting fairly comfortably in bed without acute complaints other than some weakness.        Objective:     Vitals:   Temp (24hrs), Av.4 °F (36.9 °C), Min:97.7 °F (36.5 °C),  Max:99 °F (37.2 °C)    Temp:  [97.7 °F (36.5 °C)-99 °F (37.2 °C)] 99 °F (37.2 °C)  HR:  [70-92] 70  Resp:  [15-20] 16  BP: (107-179)/(60-84) 117/61  SpO2:  [93 %-97 %] 95 %  Body mass index is 31.77 kg/m².     Input and Output Summary (last 24 hours):       Intake/Output Summary (Last 24 hours) at 1/14/2024 1614  Last data filed at 1/13/2024 1937  Gross per 24 hour   Intake 50 ml   Output --   Net 50 ml       Physical Exam:     GENERAL Remains fatigued/weak   HEAD   Normocephalic - atraumatic   EYES   nonicteric   MOUTH   Mucosa moist   NECK   Supple - no adenopathy   CARDIAC Rate controlled - S1/S2 positive   PULMONARY    Clear breath sounds bilaterally - nonlabored respirations   ABDOMEN   Soft - nontender/nondistended - active bowel sounds   MUSCULOSKELETAL   Motor strength/range of motion markedly deconditioned   NEUROLOGIC Demented at baseline   SKIN   Chronic wrinkles/blemishes - improved distal RLE erythema and warmth to touch   PSYCHIATRIC   Mood/affect flat         Additional Data:     Labs & Recent Cultures:    Results from last 7 days   Lab Units 01/14/24  0458   WBC Thousand/uL 4.58   HEMOGLOBIN g/dL 15.5*   HEMATOCRIT % 47.7*   PLATELETS Thousands/uL 184   NEUTROS PCT % 46   LYMPHS PCT % 35   MONOS PCT % 17*   EOS PCT % 1     Results from last 7 days   Lab Units 01/14/24  0458   POTASSIUM mmol/L 4.2   CHLORIDE mmol/L 103   CO2 mmol/L 28   BUN mg/dL 17   CREATININE mg/dL 0.77   CALCIUM mg/dL 9.4   ALK PHOS U/L 69   ALT U/L 26   AST U/L 48*         Results from last 7 days   Lab Units 01/14/24  1605 01/14/24  1122 01/14/24  0722 01/13/24  2048 01/13/24  1835   POC GLUCOSE mg/dl 97 93 86 92 93         Results from last 7 days   Lab Units 01/14/24  0458 01/13/24  1417   LACTIC ACID mmol/L  --  1.4   PROCALCITONIN ng/ml 0.06 0.07         Results from last 7 days   Lab Units 01/13/24  1417   BLOOD CULTURE  Received in Microbiology Lab. Culture in Progress.  Received in Microbiology Lab. Culture in Progress.          Lines/Drains:  Invasive Devices       Peripheral Intravenous Line  Duration             Peripheral IV 01/13/24 Distal;Dorsal (posterior);Right Forearm <1 day                      Last 24 Hours Medication List:   Current Facility-Administered Medications   Medication Dose Route Frequency Provider Last Rate    acetaminophen  650 mg Oral Q6H PRN Charley Carrasco MD      apixaban  5 mg Oral BID Charley Carrasco MD      atorvastatin  10 mg Oral Daily With Dinner Charley Carrasco MD      busPIRone  5 mg Oral BID Charley Carrasco MD      cefazolin  2,000 mg Intravenous Q8H Charley Carrasco MD 2,000 mg (01/14/24 1413)    cholecalciferol  2,000 Units Oral Daily Charley Carrasco MD      dextromethorphan-guaiFENesin  10 mL Oral Q4H PRN Charley Carrasco MD      escitalopram  10 mg Oral Daily Charley Carrasco MD      famotidine  20 mg Oral Daily Charley Carrasco MD      furosemide  20 mg Oral Daily Charley Carrasco MD      hydrALAZINE  5 mg Intravenous Q6H PRN Charley Carrasco MD      insulin lispro  1-6 Units Subcutaneous 4x Daily (AC & HS) Charley Carrasco MD      lisinopril  10 mg Oral Daily Charley Carrasco MD      LORazepam  0.5 mg Oral Q8H PRN Charley Carrasco MD      magnesium sulfate  2 g Intravenous Once Charley Carrasco MD      metoprolol succinate  50 mg Oral Daily Charley Carrasco MD      OLANZapine  2.5 mg Oral Daily Charley Carrasco MD      oseltamivir  75 mg Oral Q12H CJ Charley Carrasco MD      oxyCODONE  2.5 mg Oral Q6H PRN Charley Carrasco MD      oxyCODONE  5 mg Oral Q6H PRN Charley Carrasco MD      trimethobenzamide  200 mg Intramuscular Q6H PRN MD CHARLEY Pierce MD   Hospitalist - [facility]        ** Please Note: This note is constructed using a voice recognition dictation system.  An occasional wrong word/phrase or “sound-a-like” substitution may have been picked up by dictation device due to the inherent limitations of voice recognition software.  Read the chart carefully and recognize, using reasonable  context, where substitutions may have occurred.**

## 2024-01-14 NOTE — ASSESSMENT & PLAN NOTE
Patient with underlying behaviors at baseline.  Continue lexapro, buspar and olanzapine along with prn lorazepam.  Monitor for delirium in the setting of acute infection.

## 2024-01-14 NOTE — ASSESSMENT & PLAN NOTE
Patient status post Bactrim times 5 days for RLE cellulitis  Will continue Bactrim for 5 more days due to unresolved cellulitis and RLE ulceration.   Continue tylenol for pain control  No elevated WBC on recent CBC reviewed.  Repeat blood work ordered for Tuesday 01/16.  Continue leg elevation.

## 2024-01-14 NOTE — ASSESSMENT & PLAN NOTE
Supportive care  Continue Buspar/Lexapro/Zyprexa and PRN Ativan  Required a transient course of restraints overnight

## 2024-01-14 NOTE — PLAN OF CARE
Problem: Potential for Falls  Goal: Patient will remain free of falls  Description: INTERVENTIONS:  - Educate patient/family on patient safety including physical limitations  - Instruct patient to call for assistance with activity   - Consult OT/PT to assist with strengthening/mobility   - Keep Call bell within reach  - Keep bed low and locked with side rails adjusted as appropriate  - Keep care items and personal belongings within reach  - Initiate and maintain comfort rounds  - Make Fall Risk Sign visible to staff  - Offer Toileting every  Hours, in advance of need  - Initiate/Maintain alarm  - Obtain necessary fall risk management equipment:   - Apply yellow socks and bracelet for high fall risk patients  - Consider moving patient to room near nurses station  Outcome: Progressing     Problem: Prexisting or High Potential for Compromised Skin Integrity  Goal: Skin integrity is maintained or improved  Description: INTERVENTIONS:  - Identify patients at risk for skin breakdown  - Assess and monitor skin integrity  - Assess and monitor nutrition and hydration status  - Monitor labs   - Assess for incontinence   - Turn and reposition patient  - Assist with mobility/ambulation  - Relieve pressure over bony prominences  - Avoid friction and shearing  - Provide appropriate hygiene as needed including keeping skin clean and dry  - Evaluate need for skin moisturizer/barrier cream  - Collaborate with interdisciplinary team   - Patient/family teaching  - Consider wound care consult   Outcome: Progressing     Problem: NEUROSENSORY - ADULT  Goal: Achieves stable or improved neurological status  Description: INTERVENTIONS  - Monitor and report changes in neurological status  - Monitor vital signs such as temperature, blood pressure, glucose, and any other labs ordered   - Initiate measures to prevent increased intracranial pressure  - Monitor for seizure activity and implement precautions if appropriate      Outcome:  Progressing     Problem: CARDIOVASCULAR - ADULT  Goal: Maintains optimal cardiac output and hemodynamic stability  Description: INTERVENTIONS:  - Monitor I/O, vital signs and rhythm  - Monitor for S/S and trends of decreased cardiac output  - Administer and titrate ordered vasoactive medications to optimize hemodynamic stability  - Assess quality of pulses, skin color and temperature  - Assess for signs of decreased coronary artery perfusion  - Instruct patient to report change in severity of symptoms  Outcome: Progressing     Problem: SAFETY ADULT  Goal: Patient will remain free of falls  Description: INTERVENTIONS:  - Educate patient/family on patient safety including physical limitations  - Instruct patient to call for assistance with activity   - Consult OT/PT to assist with strengthening/mobility   - Keep Call bell within reach  - Keep bed low and locked with side rails adjusted as appropriate  - Keep care items and personal belongings within reach  - Initiate and maintain comfort rounds  - Make Fall Risk Sign visible to staff  - Offer Toileting every  Hours, in advance of need  - Initiate/Maintain alarm  - Obtain necessary fall risk management equipment:   - Apply yellow socks and bracelet for high fall risk patients  - Consider moving patient to room near nurses station  Outcome: Progressing

## 2024-01-14 NOTE — ASSESSMENT & PLAN NOTE
Presents with tachycardia/tachypnea and nursing home reports of recurrent fevers over the last few days  Etiology suspected to be secondary to a small developing right lower extremity lightheadedness coupled with influenza A (plan for individual assessments)  Monitor vitals and maintain hemodynamics - keep MAP > 65 -> currently at goal, and in the setting of chronic systolic CHF, IV fluid resuscitation not indicated  Blood cultures drawn in the ED - urinalysis negative for infection - CXR, on personal review, without focal infiltrates but mild bilateral reticular-type opacities vs vascular congestion (known CHF history)  Lactic acid normal - procalcitonin negative x 2

## 2024-01-14 NOTE — ASSESSMENT & PLAN NOTE
Weight currently stable  Increased bilateral lower extremity edema in the setting of cellulitis.  Will order an extra dose of Furosemide daily times 3 days  Continue metoprolol and lisinopril.  Continue leg elevation and compression stockings.

## 2024-01-14 NOTE — ASSESSMENT & PLAN NOTE
Lab Results   Component Value Date    HGBA1C 7.4 (H) 03/17/2023     Diet controlled at home  Continue SSI coverage per Accu-Cheks while hospitalized  Carbohydrate restriction  Hypoglycemia protocol

## 2024-01-14 NOTE — ASSESSMENT & PLAN NOTE
Given a dose of IV Cefepime in the ED -> de-escalated to IV Ancef  Pending right lower extremity venous duplex study to rule out underlying DVT  PRN pain control

## 2024-01-14 NOTE — PLAN OF CARE
Problem: Potential for Falls  Goal: Patient will remain free of falls  Description: INTERVENTIONS:  - Educate patient/family on patient safety including physical limitations  - Instruct patient to call for assistance with activity   - Consult OT/PT to assist with strengthening/mobility   - Keep Call bell within reach  - Keep bed low and locked with side rails adjusted as appropriate  - Keep care items and personal belongings within reach  - Initiate and maintain comfort rounds  - Make Fall Risk Sign visible to staff  - Offer Toileting every 2 Hours, in advance of need  - Initiate/Maintain bed alarm  - Obtain necessary fall risk management equipment:   - Apply yellow socks and bracelet for high fall risk patients  - Consider moving patient to room near nurses station  Outcome: Progressing         Problem: Prexisting or High Potential for Compromised Skin Integrity  Goal: Skin integrity is maintained or improved  Description: INTERVENTIONS:  - Identify patients at risk for skin breakdown  - Assess and monitor skin integrity  - Assess and monitor nutrition and hydration status  - Monitor labs   - Assess for incontinence   - Turn and reposition patient  - Assist with mobility/ambulation  - Relieve pressure over bony prominences  - Avoid friction and shearing  - Provide appropriate hygiene as needed including keeping skin clean and dry  - Evaluate need for skin moisturizer/barrier cream  - Collaborate with interdisciplinary team   - Patient/family teaching  - Consider wound care consult   Outcome: Progressing      Problem: NEUROSENSORY - ADULT  Goal: Achieves stable or improved neurological status  Description: INTERVENTIONS  - Monitor and report changes in neurological status  - Monitor vital signs such as temperature, blood pressure, glucose, and any other labs ordered   - Initiate measures to prevent increased intracranial pressure  - Monitor for seizure activity and implement precautions if appropriate       Outcome: Progressing  Goal: Achieves maximal functionality and self care  Description: INTERVENTIONS  - Monitor swallowing and airway patency with patient fatigue and changes in neurological status  - Encourage and assist patient to increase activity and self care.   - Encourage visually impaired, hearing impaired and aphasic patients to use assistive/communication devices  Outcome: Progressing      Problem: CARDIOVASCULAR - ADULT  Goal: Maintains optimal cardiac output and hemodynamic stability  Description: INTERVENTIONS:  - Monitor I/O, vital signs and rhythm  - Monitor for S/S and trends of decreased cardiac output  - Administer and titrate ordered vasoactive medications to optimize hemodynamic stability  - Assess quality of pulses, skin color and temperature  - Assess for signs of decreased coronary artery perfusion  - Instruct patient to report change in severity of symptoms  Outcome: Progressing  Goal: Absence of cardiac dysrhythmias or at baseline rhythm  Description: INTERVENTIONS:  - Continuous cardiac monitoring, vital signs, obtain 12 lead EKG if ordered  - Administer antiarrhythmic and heart rate control medications as ordered  - Monitor electrolytes and administer replacement therapy as ordered  Outcome: Progressing         Problem: RESPIRATORY - ADULT  Goal: Achieves optimal ventilation and oxygenation  Description: INTERVENTIONS:  - Assess for changes in respiratory status  - Assess for changes in mentation and behavior  - Position to facilitate oxygenation and minimize respiratory effort  - Oxygen administered by appropriate delivery if ordered  - Initiate smoking cessation education as indicated  - Encourage broncho-pulmonary hygiene including cough, deep breathe, Incentive Spirometry  - Assess the need for suctioning and aspirate as needed  - Assess and instruct to report SOB or any respiratory difficulty  - Respiratory Therapy support as indicated  Outcome: Progressing         Problem:  MUSCULOSKELETAL - ADULT  Goal: Maintain proper alignment of affected body part  Description: INTERVENTIONS:  - Support, maintain and protect limb and body alignment  - Provide patient/ family with appropriate education  Outcome: Progressing     Problem: PAIN - ADULT  Goal: Verbalizes/displays adequate comfort level or baseline comfort level  Description: Interventions:  - Encourage patient to monitor pain and request assistance  - Assess pain using appropriate pain scale  - Administer analgesics based on type and severity of pain and evaluate response  - Implement non-pharmacological measures as appropriate and evaluate response  - Consider cultural and social influences on pain and pain management  - Notify physician/advanced practitioner if interventions unsuccessful or patient reports new pain  Outcome: Progressing

## 2024-01-14 NOTE — ASSESSMENT & PLAN NOTE
Increased weakness and lethargy reported  Rapid COVID test negative  Antibiotic continued for 5 more days for RLE cellulitis.   Continue to encourage PO hydration  Continue to monitor vital signs  Send to ER for worsening signs/symptoms.

## 2024-01-15 ENCOUNTER — APPOINTMENT (INPATIENT)
Dept: VASCULAR ULTRASOUND | Facility: HOSPITAL | Age: 84
DRG: 871 | End: 2024-01-15
Payer: MEDICARE

## 2024-01-15 LAB
ALBUMIN SERPL BCP-MCNC: 3.3 G/DL (ref 3.5–5)
ALP SERPL-CCNC: 68 U/L (ref 34–104)
ALT SERPL W P-5'-P-CCNC: 20 U/L (ref 7–52)
ANION GAP SERPL CALCULATED.3IONS-SCNC: 6 MMOL/L
AST SERPL W P-5'-P-CCNC: 60 U/L (ref 13–39)
BACTERIA UR CULT: NORMAL
BASOPHILS # BLD AUTO: 0.03 THOUSANDS/ÂΜL (ref 0–0.1)
BASOPHILS NFR BLD AUTO: 1 % (ref 0–1)
BILIRUB SERPL-MCNC: 0.74 MG/DL (ref 0.2–1)
BUN SERPL-MCNC: 18 MG/DL (ref 5–25)
CALCIUM ALBUM COR SERPL-MCNC: 10.4 MG/DL (ref 8.3–10.1)
CALCIUM SERPL-MCNC: 9.8 MG/DL (ref 8.4–10.2)
CHLORIDE SERPL-SCNC: 100 MMOL/L (ref 96–108)
CO2 SERPL-SCNC: 31 MMOL/L (ref 21–32)
CREAT SERPL-MCNC: 0.73 MG/DL (ref 0.6–1.3)
EOSINOPHIL # BLD AUTO: 0.11 THOUSAND/ÂΜL (ref 0–0.61)
EOSINOPHIL NFR BLD AUTO: 2 % (ref 0–6)
ERYTHROCYTE [DISTWIDTH] IN BLOOD BY AUTOMATED COUNT: 13.8 % (ref 11.6–15.1)
GFR SERPL CREATININE-BSD FRML MDRD: 77 ML/MIN/1.73SQ M
GLUCOSE SERPL-MCNC: 137 MG/DL (ref 65–140)
GLUCOSE SERPL-MCNC: 158 MG/DL (ref 65–140)
GLUCOSE SERPL-MCNC: 80 MG/DL (ref 65–140)
GLUCOSE SERPL-MCNC: 86 MG/DL (ref 65–140)
GLUCOSE SERPL-MCNC: 89 MG/DL (ref 65–140)
HCT VFR BLD AUTO: 52 % (ref 34.8–46.1)
HGB BLD-MCNC: 17.2 G/DL (ref 11.5–15.4)
IMM GRANULOCYTES # BLD AUTO: 0.01 THOUSAND/UL (ref 0–0.2)
IMM GRANULOCYTES NFR BLD AUTO: 0 % (ref 0–2)
LYMPHOCYTES # BLD AUTO: 1.86 THOUSANDS/ÂΜL (ref 0.6–4.47)
LYMPHOCYTES NFR BLD AUTO: 41 % (ref 14–44)
MAGNESIUM SERPL-MCNC: 1.9 MG/DL (ref 1.9–2.7)
MCH RBC QN AUTO: 32.3 PG (ref 26.8–34.3)
MCHC RBC AUTO-ENTMCNC: 33.1 G/DL (ref 31.4–37.4)
MCV RBC AUTO: 98 FL (ref 82–98)
MONOCYTES # BLD AUTO: 0.84 THOUSAND/ÂΜL (ref 0.17–1.22)
MONOCYTES NFR BLD AUTO: 19 % (ref 4–12)
NEUTROPHILS # BLD AUTO: 1.7 THOUSANDS/ÂΜL (ref 1.85–7.62)
NEUTS SEG NFR BLD AUTO: 37 % (ref 43–75)
NRBC BLD AUTO-RTO: 0 /100 WBCS
PLATELET # BLD AUTO: 197 THOUSANDS/UL (ref 149–390)
PMV BLD AUTO: 10.9 FL (ref 8.9–12.7)
POTASSIUM SERPL-SCNC: 4.8 MMOL/L (ref 3.5–5.3)
PROT SERPL-MCNC: 6.8 G/DL (ref 6.4–8.4)
RBC # BLD AUTO: 5.33 MILLION/UL (ref 3.81–5.12)
SODIUM SERPL-SCNC: 137 MMOL/L (ref 135–147)
WBC # BLD AUTO: 4.55 THOUSAND/UL (ref 4.31–10.16)

## 2024-01-15 PROCEDURE — 99232 SBSQ HOSP IP/OBS MODERATE 35: CPT | Performed by: INTERNAL MEDICINE

## 2024-01-15 PROCEDURE — 93971 EXTREMITY STUDY: CPT

## 2024-01-15 PROCEDURE — 85025 COMPLETE CBC W/AUTO DIFF WBC: CPT | Performed by: INTERNAL MEDICINE

## 2024-01-15 PROCEDURE — 83735 ASSAY OF MAGNESIUM: CPT | Performed by: INTERNAL MEDICINE

## 2024-01-15 PROCEDURE — 82948 REAGENT STRIP/BLOOD GLUCOSE: CPT

## 2024-01-15 PROCEDURE — 80053 COMPREHEN METABOLIC PANEL: CPT | Performed by: INTERNAL MEDICINE

## 2024-01-15 PROCEDURE — 93971 EXTREMITY STUDY: CPT | Performed by: SURGERY

## 2024-01-15 RX ADMIN — ATORVASTATIN CALCIUM 10 MG: 10 TABLET, FILM COATED ORAL at 16:48

## 2024-01-15 RX ADMIN — FAMOTIDINE 20 MG: 20 TABLET ORAL at 08:59

## 2024-01-15 RX ADMIN — LISINOPRIL 10 MG: 10 TABLET ORAL at 08:58

## 2024-01-15 RX ADMIN — METOPROLOL SUCCINATE 50 MG: 50 TABLET, EXTENDED RELEASE ORAL at 08:59

## 2024-01-15 RX ADMIN — OSELTAMIVIR PHOSPHATE 75 MG: 75 CAPSULE ORAL at 08:59

## 2024-01-15 RX ADMIN — BUSPIRONE HYDROCHLORIDE 5 MG: 5 TABLET ORAL at 08:59

## 2024-01-15 RX ADMIN — Medication 2000 UNITS: at 08:59

## 2024-01-15 RX ADMIN — APIXABAN 5 MG: 5 TABLET, FILM COATED ORAL at 08:59

## 2024-01-15 RX ADMIN — OSELTAMIVIR PHOSPHATE 75 MG: 75 CAPSULE ORAL at 21:52

## 2024-01-15 RX ADMIN — CEFAZOLIN SODIUM 2000 MG: 2 SOLUTION INTRAVENOUS at 15:05

## 2024-01-15 RX ADMIN — ESCITALOPRAM OXALATE 10 MG: 10 TABLET ORAL at 08:58

## 2024-01-15 RX ADMIN — OLANZAPINE 2.5 MG: 2.5 TABLET, FILM COATED ORAL at 08:59

## 2024-01-15 RX ADMIN — CEFAZOLIN SODIUM 2000 MG: 2 SOLUTION INTRAVENOUS at 06:42

## 2024-01-15 RX ADMIN — LORAZEPAM 0.5 MG: 0.5 TABLET ORAL at 16:48

## 2024-01-15 RX ADMIN — APIXABAN 5 MG: 5 TABLET, FILM COATED ORAL at 17:09

## 2024-01-15 RX ADMIN — BUSPIRONE HYDROCHLORIDE 5 MG: 5 TABLET ORAL at 21:52

## 2024-01-15 RX ADMIN — CEFAZOLIN SODIUM 2000 MG: 2 SOLUTION INTRAVENOUS at 21:52

## 2024-01-15 RX ADMIN — INSULIN LISPRO 1 UNITS: 100 INJECTION, SOLUTION INTRAVENOUS; SUBCUTANEOUS at 16:49

## 2024-01-15 RX ADMIN — FUROSEMIDE 20 MG: 20 TABLET ORAL at 08:58

## 2024-01-15 NOTE — PLAN OF CARE
Problem: Potential for Falls  Goal: Patient will remain free of falls  Description: INTERVENTIONS:  - Educate patient/family on patient safety including physical limitations  - Instruct patient to call for assistance with activity   - Consult OT/PT to assist with strengthening/mobility   - Keep Call bell within reach  - Keep bed low and locked with side rails adjusted as appropriate  - Keep care items and personal belongings within reach  - Initiate and maintain comfort rounds  - Make Fall Risk Sign visible to staff  - Offer Toileting every  Hours, in advance of need  - Initiate/Maintain alarm  - Obtain necessary fall risk management equipment:   - Apply yellow socks and bracelet for high fall risk patients  - Consider moving patient to room near nurses station  Outcome: Progressing     Problem: Prexisting or High Potential for Compromised Skin Integrity  Goal: Skin integrity is maintained or improved  Description: INTERVENTIONS:  - Identify patients at risk for skin breakdown  - Assess and monitor skin integrity  - Assess and monitor nutrition and hydration status  - Monitor labs   - Assess for incontinence   - Turn and reposition patient  - Assist with mobility/ambulation  - Relieve pressure over bony prominences  - Avoid friction and shearing  - Provide appropriate hygiene as needed including keeping skin clean and dry  - Evaluate need for skin moisturizer/barrier cream  - Collaborate with interdisciplinary team   - Patient/family teaching  - Consider wound care consult   Outcome: Progressing     Problem: NEUROSENSORY - ADULT  Goal: Achieves stable or improved neurological status  Description: INTERVENTIONS  - Monitor and report changes in neurological status  - Monitor vital signs such as temperature, blood pressure, glucose, and any other labs ordered   - Initiate measures to prevent increased intracranial pressure  - Monitor for seizure activity and implement precautions if appropriate      Outcome:  Progressing  Goal: Achieves maximal functionality and self care  Description: INTERVENTIONS  - Monitor swallowing and airway patency with patient fatigue and changes in neurological status  - Encourage and assist patient to increase activity and self care.   - Encourage visually impaired, hearing impaired and aphasic patients to use assistive/communication devices  Outcome: Progressing     Problem: CARDIOVASCULAR - ADULT  Goal: Maintains optimal cardiac output and hemodynamic stability  Description: INTERVENTIONS:  - Monitor I/O, vital signs and rhythm  - Monitor for S/S and trends of decreased cardiac output  - Administer and titrate ordered vasoactive medications to optimize hemodynamic stability  - Assess quality of pulses, skin color and temperature  - Assess for signs of decreased coronary artery perfusion  - Instruct patient to report change in severity of symptoms  Outcome: Progressing  Goal: Absence of cardiac dysrhythmias or at baseline rhythm  Description: INTERVENTIONS:  - Continuous cardiac monitoring, vital signs, obtain 12 lead EKG if ordered  - Administer antiarrhythmic and heart rate control medications as ordered  - Monitor electrolytes and administer replacement therapy as ordered  Outcome: Progressing     Problem: RESPIRATORY - ADULT  Goal: Achieves optimal ventilation and oxygenation  Description: INTERVENTIONS:  - Assess for changes in respiratory status  - Assess for changes in mentation and behavior  - Position to facilitate oxygenation and minimize respiratory effort  - Oxygen administered by appropriate delivery if ordered  - Initiate smoking cessation education as indicated  - Encourage broncho-pulmonary hygiene including cough, deep breathe, Incentive Spirometry  - Assess the need for suctioning and aspirate as needed  - Assess and instruct to report SOB or any respiratory difficulty  - Respiratory Therapy support as indicated  Outcome: Progressing     Problem: GENITOURINARY - ADULT  Goal:  Maintains or returns to baseline urinary function  Description: INTERVENTIONS:  - Assess urinary function  - Encourage oral fluids to ensure adequate hydration if ordered  - Administer IV fluids as ordered to ensure adequate hydration  - Administer ordered medications as needed  - Offer frequent toileting  - Follow urinary retention protocol if ordered  Outcome: Progressing  Goal: Absence of urinary retention  Description: INTERVENTIONS:  - Assess patient’s ability to void and empty bladder  - Monitor I/O  - Bladder scan as needed  - Discuss with physician/AP medications to alleviate retention as needed  - Discuss catheterization for long term situations as appropriate  Outcome: Progressing     Problem: MUSCULOSKELETAL - ADULT  Goal: Maintain or return mobility to safest level of function  Description: INTERVENTIONS:  - Assess patient's ability to carry out ADLs; assess patient's baseline for ADL function and identify physical deficits which impact ability to perform ADLs (bathing, care of mouth/teeth, toileting, grooming, dressing, etc.)  - Assess/evaluate cause of self-care deficits   - Assess range of motion  - Assess patient's mobility  - Assess patient's need for assistive devices and provide as appropriate  - Encourage maximum independence but intervene and supervise when necessary  - Involve family in performance of ADLs  - Assess for home care needs following discharge   - Consider OT consult to assist with ADL evaluation and planning for discharge  - Provide patient education as appropriate  Outcome: Progressing  Goal: Maintain proper alignment of affected body part  Description: INTERVENTIONS:  - Support, maintain and protect limb and body alignment  - Provide patient/ family with appropriate education  Outcome: Progressing     Problem: PAIN - ADULT  Goal: Verbalizes/displays adequate comfort level or baseline comfort level  Description: Interventions:  - Encourage patient to monitor pain and request  assistance  - Assess pain using appropriate pain scale  - Administer analgesics based on type and severity of pain and evaluate response  - Implement non-pharmacological measures as appropriate and evaluate response  - Consider cultural and social influences on pain and pain management  - Notify physician/advanced practitioner if interventions unsuccessful or patient reports new pain  Outcome: Progressing     Problem: SAFETY ADULT  Goal: Patient will remain free of falls  Description: INTERVENTIONS:  - Educate patient/family on patient safety including physical limitations  - Instruct patient to call for assistance with activity   - Consult OT/PT to assist with strengthening/mobility   - Keep Call bell within reach  - Keep bed low and locked with side rails adjusted as appropriate  - Keep care items and personal belongings within reach  - Initiate and maintain comfort rounds  - Make Fall Risk Sign visible to staff  - Offer Toileting every  Hours, in advance of need  - Initiate/Maintain alarm  - Obtain necessary fall risk management equipment:   - Apply yellow socks and bracelet for high fall risk patients  - Consider moving patient to room near nurses station  Outcome: Progressing  Goal: Maintain or return to baseline ADL function  Description: INTERVENTIONS:  -  Assess patient's ability to carry out ADLs; assess patient's baseline for ADL function and identify physical deficits which impact ability to perform ADLs (bathing, care of mouth/teeth, toileting, grooming, dressing, etc.)  - Assess/evaluate cause of self-care deficits   - Assess range of motion  - Assess patient's mobility; develop plan if impaired  - Assess patient's need for assistive devices and provide as appropriate  - Encourage maximum independence but intervene and supervise when necessary  - Involve family in performance of ADLs  - Assess for home care needs following discharge   - Consider OT consult to assist with ADL evaluation and planning for  discharge  - Provide patient education as appropriate  Outcome: Progressing  Goal: Maintains/Returns to pre admission functional level  Description: INTERVENTIONS:  - Perform AM-PAC 6 Click Basic Mobility/ Daily Activity assessment daily.  - Set and communicate daily mobility goal to care team and patient/family/caregiver.   - Collaborate with rehabilitation services on mobility goals if consulted  - Perform Range of Motion  times a day.  - Reposition patient every  hours.  - Dangle patient  times a day  - Stand patient  times a day  - Ambulate patient  times a day  - Out of bed to chair  times a day   - Out of bed for meals times a day  - Out of bed for toileting  - Record patient progress and toleration of activity level   Outcome: Progressing     Problem: DISCHARGE PLANNING  Goal: Discharge to home or other facility with appropriate resources  Description: INTERVENTIONS:  - Identify barriers to discharge w/patient and caregiver  - Arrange for needed discharge resources and transportation as appropriate  - Identify discharge learning needs (meds, wound care, etc.)  - Arrange for interpretive services to assist at discharge as needed  - Refer to Case Management Department for coordinating discharge planning if the patient needs post-hospital services based on physician/advanced practitioner order or complex needs related to functional status, cognitive ability, or social support system  Outcome: Progressing

## 2024-01-15 NOTE — ASSESSMENT & PLAN NOTE
Given a dose of IV Cefepime in the ED -> de-escalated to IV Ancef  Preliminary read from right lower extremity venous duplex study negative for DVT (await official report)

## 2024-01-15 NOTE — PROGRESS NOTES
[facility]  Progress Note  Name: Tracy Rock I  MRN: [id number]  Unit/Bed#: -01 I Date of Admission: 1/13/2024   Date of Service: 1/15/2024 I Hospital Day: 2      Assessment & Plan:    * Sepsis (HCC)  Assessment & Plan  Presents with tachycardia/tachypnea and nursing home reports of recurrent fevers over the last few days  Etiology suspected to be secondary to a small developing right lower extremity lightheadedness coupled with influenza A (plan for individual assessments)  Monitor vitals and maintain hemodynamics - keep MAP > 65 -> currently at goal, and in the setting of chronic systolic CHF, IV fluid resuscitation not indicated  Blood cultures from 1/13 remain negative thus far - urinalysis negative for infection - CXR, on personal review, without focal infiltrates but mild bilateral reticular-type opacities vs vascular congestion (known CHF history)  Lactic acid normal - procalcitonin negative x 2     Influenza A  Assessment & Plan  Contact/droplet precautions  Continue Tamiflu  Supportive care  Maintain oxygenation    Cellulitis of right lower extremity  Assessment & Plan  Given a dose of IV Cefepime in the ED -> de-escalated to IV Ancef  Preliminary read from right lower extremity venous duplex study negative for DVT (await official report)    Atrial fibrillation (HCC)  Assessment & Plan  Chronic rate controlled with Toprol-XL  On Eliquis for anticoagulation  [id number] score of 8    Chronic systolic heart failure (HCC)  Assessment & Plan  Last EF of 20-25% in October 2021  On diuresis with Lasix and beta-blockade with Toprol-XL  Low-sodium diet with fluid restriction enforced  Monitor/replete serum potassium/magnesium as necessary    Hypomagnesemia  Assessment & Plan  Monitor/replete serum magnesium and potassium    Dementia (HCC)  Assessment & Plan  Supportive care  Continue Buspar/Lexapro/Zyprexa and PRN Ativan  Has required intermittent course of restraints    Essential  hypertension  Assessment & Plan  Low sodium restriction  Continue Zestril/Toprol-XL - additional PRN IV Hydralazine on board    GERD (gastroesophageal reflux disease)  Assessment & Plan  Continue Pepcid    Hyperlipidemia  Assessment & Plan  Continue statin    Type 2 diabetes mellitus (HCC)  Assessment & Plan  Lab Results   Component Value Date    HGBA1C 7.4 (H) 2023     Diet controlled at home  Continue SSI coverage per [employer]ks while hospitalized  Carbohydrate restriction  Hypoglycemia protocol    History of breast cancer  Assessment & Plan  Status post left mastectomy  Outpatient follow-up      DVT Prophylaxis:  Eliquis    AM-PAC Basic Mobility:  Basic Mobility Inpatient Raw Score: 6  -HL Achieved: 2: Bed activities/Dependent transfer  -HLM Goal: 2: Bed activities/Dependent transfer    Patient Centered Rounds:  I have performed bedside rounds and discussed plan of care with nursing today.  Discussions with Specialists or Other Care Team Provider:  see above assessments if applicable    Education and Discussions with Family / Patient:  Patient at bedside - discussed with sonGhassan, over the phone, yesterday    Time Spent for Care:  35 minutes. More than 50% of total time spent on counseling and coordination of care, on one or more of the following: performing physical exam; counseling and coordination of care, obtaining or reviewing history, documenting in the medical record, reviewing/ordering tests/medications/procedures, and communicating with other healthcare professionals.    Current Length of Stay: 2 day(s)  Current Patient Status: Inpatient   Certification Statement:  Patient will continue to require additional hospital stay due to assessments as noted above.    Code Status: Level 1 - Full Code        Subjective:     Encountered earlier in the day.  Laying in bed remaining weak and fatigued.  Does not seem to be in acute distress.        Objective:     Vitals:   Temp (24hrs), Av.6 °F (37  °C), Min:98.2 °F (36.8 °C), Max:99 °F (37.2 °C)    Temp:  [98.2 °F (36.8 °C)-99 °F (37.2 °C)] 98.2 °F (36.8 °C)  HR:  [68-72] 68  Resp:  [16-20] 16  BP: (117-142)/(61-87) 142/87  SpO2:  [92 %-96 %] 96 %  Body mass index is 31.77 kg/m².     Input and Output Summary (last 24 hours):       Intake/Output Summary (Last 24 hours) at 1/15/2024 1217  Last data filed at 1/15/2024 0900  Gross per 24 hour   Intake 480 ml   Output --   Net 480 ml       Physical Exam:     GENERAL Fatigued/weak   HEAD   Normocephalic - atraumatic   EYES   PERRL - EOMI    MOUTH   Mucosa moist   NECK   Supple - full range of motion   CARDIAC Rate controlled - S1/S2 positive   PULMONARY    Clear breath sounds bilaterally - nonlabored respirations   ABDOMEN   Soft - nontender/nondistended - active bowel sounds   MUSCULOSKELETAL   Motor strength/range of motion deconditioned   NEUROLOGIC Baseline demented   SKIN   Chronic wrinkles/blemishes - improved warmth and distal RLE erythema   PSYCHIATRIC   Mood/affect flat         Additional Data:     Labs & Recent Cultures:    Results from last 7 days   Lab Units 01/15/24  0614   WBC Thousand/uL 4.55   HEMOGLOBIN g/dL 17.2*   HEMATOCRIT % 52.0*   PLATELETS Thousands/uL 197   NEUTROS PCT % 37*   LYMPHS PCT % 41   MONOS PCT % 19*   EOS PCT % 2     Results from last 7 days   Lab Units 01/15/24  0614   POTASSIUM mmol/L 4.8   CHLORIDE mmol/L 100   CO2 mmol/L 31   BUN mg/dL 18   CREATININE mg/dL 0.73   CALCIUM mg/dL 9.8   ALK PHOS U/L 68   ALT U/L 20   AST U/L 60*         Results from last 7 days   Lab Units 01/15/24  1124 01/15/24  0709 01/14/24 2034 01/14/24  1605 01/14/24  1122 01/14/24  0722 01/13/24  2048 01/13/24  1835   POC GLUCOSE mg/dl 137 80 103 97 93 86 92 93         Results from last 7 days   Lab Units 01/14/24  0458 01/13/24  1417   LACTIC ACID mmol/L  --  1.4   PROCALCITONIN ng/ml 0.06 0.07         Results from last 7 days   Lab Units 01/13/24  1600 01/13/24  1417   BLOOD CULTURE   --  No Growth at  24 hrs.  No Growth at 24 hrs.   URINE CULTURE  No Growth <1000 cfu/mL  --          Lines/Drains:  Invasive Devices       Peripheral Intravenous Line  Duration             Peripheral IV 01/13/24 Distal;Dorsal (posterior);Right Forearm 1 day                      Last 24 Hours Medication List:   Current Facility-Administered Medications   Medication Dose Route Frequency Provider Last Rate    acetaminophen  650 mg Oral Q6H PRN Charley Carrasco MD      apixaban  5 mg Oral BID Charley Carrasco MD      atorvastatin  10 mg Oral Daily With Dinner Charley Carrasco MD      busPIRone  5 mg Oral BID Charley Carrasco MD      cefazolin  2,000 mg Intravenous Q8H Charley Carrasco MD 2,000 mg (01/15/24 0642)    cholecalciferol  2,000 Units Oral Daily Charley Carrasco MD      dextromethorphan-guaiFENesin  10 mL Oral Q4H PRN Charley aCrrasco MD      escitalopram  10 mg Oral Daily Charley Carrasco MD      famotidine  20 mg Oral Daily Charley Carrasco MD      furosemide  20 mg Oral Daily Charley Carrasco MD      hydrALAZINE  5 mg Intravenous Q6H PRN Charley Carrasco MD      insulin lispro  1-6 Units Subcutaneous 4x Daily (AC & HS) Charley Carrasco MD      lisinopril  10 mg Oral Daily Charley Carrasco MD      LORazepam  0.5 mg Oral Q8H PRN Charley Carrasco MD      metoprolol succinate  50 mg Oral Daily Charley Carrasco MD      OLANZapine  2.5 mg Oral Daily Charley Carrasco MD      oseltamivir  75 mg Oral Q12H [facility] Charley Carrasco MD      oxyCODONE  2.5 mg Oral Q6H PRN Charley Carrasco MD      oxyCODONE  5 mg Oral Q6H PRN Charley Carrasco MD      trimethobenzamide  200 mg Intramuscular Q6H PRN MD CHARLEY Pierce MD   Hospitalist - [facility]        ** Please Note: This note is constructed using a voice recognition dictation system.  An occasional wrong word/phrase or “sound-a-like” substitution may have been picked up by dictation device due to the inherent limitations of voice recognition software.  Read the chart carefully and recognize, using  reasonable context, where substitutions may have occurred.**      High Dose Vitamin A Counseling: Side effects reviewed, pt to contact office should one occur. Terbinafine Counseling: Patient counseling regarding adverse effects of terbinafine including but not limited to headache, diarrhea, rash, upset stomach, liver function test abnormalities, itching, taste/smell disturbance, nausea, abdominal pain, and flatulence.  There is a rare possibility of liver failure that can occur when taking terbinafine.  The patient understands that a baseline LFT and kidney function test may be required. The patient verbalized understanding of the proper use and possible adverse effects of terbinafine.  All of the patient's questions and concerns were addressed. Minocycline Pregnancy And Lactation Text: This medication is Pregnancy Category D and not consider safe during pregnancy. It is also excreted in breast milk. Detail Level: Simple Cellcept Counseling:  I discussed with the patient the risks of mycophenolate mofetil including but not limited to infection/immunosuppression, GI upset, hypokalemia, hypercholesterolemia, bone marrow suppression, lymphoproliferative disorders, malignancy, GI ulceration/bleed/perforation, colitis, interstitial lung disease, kidney failure, progressive multifocal leukoencephalopathy, and birth defects.  The patient understands that monitoring is required including a baseline creatinine and regular CBC testing. In addition, patient must alert us immediately if symptoms of infection or other concerning signs are noted. Hydroxychloroquine Pregnancy And Lactation Text: This medication has been shown to cause fetal harm but it isn't assigned a Pregnancy Risk Category. There are small amounts excreted in breast milk. Hydroxychloroquine Counseling:  I discussed with the patient that a baseline ophthalmologic exam is needed at the start of therapy and every year thereafter while on therapy. A CBC may also be warranted for monitoring.  The side effects of this medication were discussed with the patient, including but not limited to agranulocytosis, aplastic anemia, seizures, rashes, retinopathy, and liver toxicity. Patient instructed to call the office should any adverse effect occur.  The patient verbalized understanding of the proper use and possible adverse effects of Plaquenil.  All the patient's questions and concerns were addressed. Azathioprine Pregnancy And Lactation Text: This medication is Pregnancy Category D and isn't considered safe during pregnancy. It is unknown if this medication is excreted in breast milk. Eucrisa Pregnancy And Lactation Text: This medication has not been assigned a Pregnancy Risk Category but animal studies failed to show danger with the topical medication. It is unknown if the medication is excreted in breast milk. Spironolactone Counseling: Patient advised regarding risks of diarrhea, abdominal pain, hyperkalemia, birth defects (for female patients), liver toxicity and renal toxicity. The patient may need blood work to monitor liver and kidney function and potassium levels while on therapy. The patient verbalized understanding of the proper use and possible adverse effects of spironolactone.  All of the patient's questions and concerns were addressed. Topical Retinoid Pregnancy And Lactation Text: This medication is Pregnancy Category C. It is unknown if this medication is excreted in breast milk. Cimzia Counseling:  I discussed with the patient the risks of Cimzia including but not limited to immunosuppression, allergic reactions and infections.  The patient understands that monitoring is required including a PPD at baseline and must alert us or the primary physician if symptoms of infection or other concerning signs are noted. Siliq Pregnancy And Lactation Text: The risk during pregnancy and breastfeeding is uncertain with this medication. Arava Counseling:  Patient counseled regarding adverse effects of Arava including but not limited to nausea, vomiting, abnormalities in liver function tests. Patients may develop mouth sores, rash, diarrhea, and abnormalities in blood counts. The patient understands that monitoring is required including LFTs and blood counts.  There is a rare possibility of scarring of the liver and lung problems that can occur when taking methotrexate. Persistent nausea, loss of appetite, pale stools, dark urine, cough, and shortness of breath should be reported immediately. Patient advised to discontinue Arava treatment and consult with a physician prior to attempting conception. The patient will have to undergo a treatment to eliminate Arava from the body prior to conception. Terbinafine Pregnancy And Lactation Text: This medication is Pregnancy Category B and is considered safe during pregnancy. It is also excreted in breast milk and breast feeding isn't recommended. Quinolones Counseling:  I discussed with the patient the risks of fluoroquinolones including but not limited to GI upset, allergic reaction, drug rash, diarrhea, dizziness, photosensitivity, yeast infections, liver function test abnormalities, tendonitis/tendon rupture. Hydroquinone Counseling:  Patient advised that medication may result in skin irritation, lightening (hypopigmentation), dryness, and burning.  In the event of skin irritation, the patient was advised to reduce the amount of the drug applied or use it less frequently.  Rarely, spots that are treated with hydroquinone can become darker (pseudoochronosis).  Should this occur, patient instructed to stop medication and call the office. The patient verbalized understanding of the proper use and possible adverse effects of hydroquinone.  All of the patient's questions and concerns were addressed. Quinolones Pregnancy And Lactation Text: This medication is Pregnancy Category C and it isn't know if it is safe during pregnancy. It is also excreted in breast milk. Cimzia Pregnancy And Lactation Text: This medication crosses the placenta but can be considered safe in certain situations. Cimzia may be excreted in breast milk. Spironolactone Pregnancy And Lactation Text: This medication can cause feminization of the male fetus and should be avoided during pregnancy. The active metabolite is also found in breast milk. Simponi Counseling:  I discussed with the patient the risks of golimumab including but not limited to myelosuppression, immunosuppression, autoimmune hepatitis, demyelinating diseases, lymphoma, and serious infections.  The patient understands that monitoring is required including a PPD at baseline and must alert us or the primary physician if symptoms of infection or other concerning signs are noted. High Dose Vitamin A Pregnancy And Lactation Text: High dose vitamin A therapy is contraindicated during pregnancy and breast feeding. Imiquimod Counseling:  I discussed with the patient the risks of imiquimod including but not limited to erythema, scaling, itching, weeping, crusting, and pain.  Patient understands that the inflammatory response to imiquimod is variable from person to person and was educated regarded proper titration schedule.  If flu-like symptoms develop, patient knows to discontinue the medication and contact us. Arava Pregnancy And Lactation Text: This medication is Pregnancy Category X and is absolutely contraindicated during pregnancy. It is unknown if it is excreted in breast milk. Include Pregnancy/Lactation Warning?: No Skyrizi Counseling: I discussed with the patient the risks of risankizumab-rzaa including but not limited to immunosuppression, and serious infections.  The patient understands that monitoring is required including a PPD at baseline and must alert us or the primary physician if symptoms of infection or other concerning signs are noted. Libtayo Pregnancy And Lactation Text: This medication is contraindicated in pregnancy and when breast feeding. Cyclophosphamide Counseling:  I discussed with the patient the risks of cyclophosphamide including but not limited to hair loss, hormonal abnormalities, decreased fertility, abdominal pain, diarrhea, nausea and vomiting, bone marrow suppression and infection. The patient understands that monitoring is required while taking this medication. Libtayo Counseling- I discussed with the patient the risks of Libtayo including but not limited to nausea, vomiting, diarrhea, and bone or muscle pain.  The patient verbalized understanding of the proper use and possible adverse effects of Libtayo.  All of the patient's questions and concerns were addressed. Cimetidine Counseling:  I discussed with the patient the risks of Cimetidine including but not limited to gynecomastia, headache, diarrhea, nausea, drowsiness, arrhythmias, pancreatitis, skin rashes, psychosis, bone marrow suppression and kidney toxicity. SSKI Counseling:  I discussed with the patient the risks of SSKI including but not limited to thyroid abnormalities, metallic taste, GI upset, fever, headache, acne, arthralgias, paraesthesias, lymphadenopathy, easy bleeding, arrhythmias, and allergic reaction. Rifampin Counseling: I discussed with the patient the risks of rifampin including but not limited to liver damage, kidney damage, red-orange body fluids, nausea/vomiting and severe allergy. Cosentyx Counseling:  I discussed with the patient the risks of Cosentyx including but not limited to worsening of Crohn's disease, immunosuppression, allergic reactions and infections.  The patient understands that monitoring is required including a PPD at baseline and must alert us or the primary physician if symptoms of infection or other concerning signs are noted. Clofazimine Counseling:  I discussed with the patient the risks of clofazimine including but not limited to skin and eye pigmentation, liver damage, nausea/vomiting, gastrointestinal bleeding and allergy. Cyclophosphamide Pregnancy And Lactation Text: This medication is Pregnancy Category D and it isn't considered safe during pregnancy. This medication is excreted in breast milk. Thalidomide Counseling: I discussed with the patient the risks of thalidomide including but not limited to birth defects, anxiety, weakness, chest pain, dizziness, cough and severe allergy. Niacinamide Counseling: I recommended taking niacin or niacinamide, also know as vitamin B3, twice daily. Recent evidence suggests that taking vitamin B3 (500 mg twice daily) can reduce the risk of actinic keratoses and non-melanoma skin cancers. Side effects of vitamin B3 include flushing and headache. Sski Pregnancy And Lactation Text: This medication is Pregnancy Category D and isn't considered safe during pregnancy. It is excreted in breast milk. Cosentyx Pregnancy And Lactation Text: This medication is Pregnancy Category B and is considered safe during pregnancy. It is unknown if this medication is excreted in breast milk. Rifampin Pregnancy And Lactation Text: This medication is Pregnancy Category C and it isn't know if it is safe during pregnancy. It is also excreted in breast milk and should not be used if you are breast feeding. Doxepin Counseling:  Patient advised that the medication is sedating and not to drive a car after taking this medication. Patient informed of potential adverse effects including but not limited to dry mouth, urinary retention, and blurry vision.  The patient verbalized understanding of the proper use and possible adverse effects of doxepin.  All of the patient's questions and concerns were addressed. Niacinamide Pregnancy And Lactation Text: These medications are considered safe during pregnancy. Sarecycline Counseling: Patient advised regarding possible photosensitivity and discoloration of the teeth, skin, lips, tongue and gums.  Patient instructed to avoid sunlight, if possible.  When exposed to sunlight, patients should wear protective clothing, sunglasses, and sunscreen.  The patient was instructed to call the office immediately if the following severe adverse effects occur:  hearing changes, easy bruising/bleeding, severe headache, or vision changes.  The patient verbalized understanding of the proper use and possible adverse effects of sarecycline.  All of the patient's questions and concerns were addressed. Clofazimine Pregnancy And Lactation Text: This medication is Pregnancy Category C and isn't considered safe during pregnancy. It is excreted in breast milk. Cyclosporine Counseling:  I discussed with the patient the risks of cyclosporine including but not limited to hypertension, gingival hyperplasia,myelosuppression, immunosuppression, liver damage, kidney damage, neurotoxicity, lymphoma, and serious infections. The patient understands that monitoring is required including baseline blood pressure, CBC, CMP, lipid panel and uric acid, and then 1-2 times monthly CMP and blood pressure. Minoxidil Counseling: Minoxidil is a topical medication which can increase blood flow where it is applied. It is uncertain how this medication increases hair growth. Side effects are uncommon and include stinging and allergic reactions. Dupixent Pregnancy And Lactation Text: This medication likely crosses the placenta but the risk for the fetus is uncertain. This medication is excreted in breast milk. Stelara Counseling:  I discussed with the patient the risks of ustekinumab including but not limited to immunosuppression, malignancy, posterior leukoencephalopathy syndrome, and serious infections.  The patient understands that monitoring is required including a PPD at baseline and must alert us or the primary physician if symptoms of infection or other concerning signs are noted. Dupixent Counseling: I discussed with the patient the risks of dupilumab including but not limited to eye infection and irritation, cold sores, injection site reactions, worsening of asthma, allergic reactions and increased risk of parasitic infection.  Live vaccines should be avoided while taking dupilumab. Dupilumab will also interact with certain medications such as warfarin and cyclosporine. The patient understands that monitoring is required and they must alert us or the primary physician if symptoms of infection or other concerning signs are noted. Benzoyl Peroxide Counseling: Patient counseled that medicine may cause skin irritation and bleach clothing.  In the event of skin irritation, the patient was advised to reduce the amount of the drug applied or use it less frequently.   The patient verbalized understanding of the proper use and possible adverse effects of benzoyl peroxide.  All of the patient's questions and concerns were addressed. Doxepin Pregnancy And Lactation Text: This medication is Pregnancy Category C and it isn't known if it is safe during pregnancy. It is also excreted in breast milk and breast feeding isn't recommended. Carac Counseling:  I discussed with the patient the risks of Carac including but not limited to erythema, scaling, itching, weeping, crusting, and pain. Tranexamic Acid Counseling:  Patient advised of the small risk of bleeding problems with tranexamic acid. They were also instructed to call if they developed any nausea, vomiting or diarrhea. All of the patient's questions and concerns were addressed. Benzoyl Peroxide Pregnancy And Lactation Text: This medication is Pregnancy Category C. It is unknown if benzoyl peroxide is excreted in breast milk. Colchicine Counseling:  Patient counseled regarding adverse effects including but not limited to stomach upset (nausea, vomiting, stomach pain, or diarrhea).  Patient instructed to limit alcohol consumption while taking this medication.  Colchicine may reduce blood counts especially with prolonged use.  The patient understands that monitoring of kidney function and blood counts may be required, especially at baseline. The patient verbalized understanding of the proper use and possible adverse effects of colchicine.  All of the patient's questions and concerns were addressed. Tazorac Counseling:  Patient advised that medication is irritating and drying.  Patient may need to apply sparingly and wash off after an hour before eventually leaving it on overnight.  The patient verbalized understanding of the proper use and possible adverse effects of tazorac.  All of the patient's questions and concerns were addressed. Nsaids Counseling: NSAID Counseling: I discussed with the patient that NSAIDs should be taken with food. Prolonged use of NSAIDs can result in the development of stomach ulcers.  Patient advised to stop taking NSAIDs if abdominal pain occurs.  The patient verbalized understanding of the proper use and possible adverse effects of NSAIDs.  All of the patient's questions and concerns were addressed. Cyclosporine Pregnancy And Lactation Text: This medication is Pregnancy Category C and it isn't know if it is safe during pregnancy. This medication is excreted in breast milk. Azithromycin Counseling:  I discussed with the patient the risks of azithromycin including but not limited to GI upset, allergic reaction, drug rash, diarrhea, and yeast infections. Tetracycline Counseling: Patient counseled regarding possible photosensitivity and increased risk for sunburn.  Patient instructed to avoid sunlight, if possible.  When exposed to sunlight, patients should wear protective clothing, sunglasses, and sunscreen.  The patient was instructed to call the office immediately if the following severe adverse effects occur:  hearing changes, easy bruising/bleeding, severe headache, or vision changes.  The patient verbalized understanding of the proper use and possible adverse effects of tetracycline.  All of the patient's questions and concerns were addressed. Patient understands to avoid pregnancy while on therapy due to potential birth defects. Taltz Counseling: I discussed with the patient the risks of ixekizumab including but not limited to immunosuppression, serious infections, worsening of inflammatory bowel disease and drug reactions.  The patient understands that monitoring is required including a PPD at baseline and must alert us or the primary physician if symptoms of infection or other concerning signs are noted. Methotrexate Counseling:  Patient counseled regarding adverse effects of methotrexate including but not limited to nausea, vomiting, abnormalities in liver function tests. Patients may develop mouth sores, rash, diarrhea, and abnormalities in blood counts. The patient understands that monitoring is required including LFT's and blood counts.  There is a rare possibility of scarring of the liver and lung problems that can occur when taking methotrexate. Persistent nausea, loss of appetite, pale stools, dark urine, cough, and shortness of breath should be reported immediately. Patient advised to discontinue methotrexate treatment at least three months before attempting to become pregnant.  I discussed the need for folate supplements while taking methotrexate.  These supplements can decrease side effects during methotrexate treatment. The patient verbalized understanding of the proper use and possible adverse effects of methotrexate.  All of the patient's questions and concerns were addressed. Tranexamic Acid Pregnancy And Lactation Text: It is unknown if this medication is safe during pregnancy or breast feeding. Carac Pregnancy And Lactation Text: This medication is Pregnancy Category X and contraindicated in pregnancy and in women who may become pregnant. It is unknown if this medication is excreted in breast milk. Topical Clindamycin Counseling: Patient counseled that this medication may cause skin irritation or allergic reactions.  In the event of skin irritation, the patient was advised to reduce the amount of the drug applied or use it less frequently.   The patient verbalized understanding of the proper use and possible adverse effects of clindamycin.  All of the patient's questions and concerns were addressed. Tazorac Pregnancy And Lactation Text: This medication is not safe during pregnancy. It is unknown if this medication is excreted in breast milk. Nsaids Pregnancy And Lactation Text: These medications are considered safe up to 30 weeks gestation. It is excreted in breast milk. Enbrel Counseling:  I discussed with the patient the risks of etanercept including but not limited to myelosuppression, immunosuppression, autoimmune hepatitis, demyelinating diseases, lymphoma, and infections.  The patient understands that monitoring is required including a PPD at baseline and must alert us or the primary physician if symptoms of infection or other concerning signs are noted. Mirvaso Counseling: Mirvaso is a topical medication which can decrease superficial blood flow where applied. Side effects are uncommon and include stinging, redness and allergic reactions. Hydroxyzine Counseling: Patient advised that the medication is sedating and not to drive a car after taking this medication.  Patient informed of potential adverse effects including but not limited to dry mouth, urinary retention, and blurry vision.  The patient verbalized understanding of the proper use and possible adverse effects of hydroxyzine.  All of the patient's questions and concerns were addressed. Methotrexate Pregnancy And Lactation Text: This medication is Pregnancy Category X and is known to cause fetal harm. This medication is excreted in breast milk. Valtrex Counseling: I discussed with the patient the risks of valacyclovir including but not limited to kidney damage, nausea, vomiting and severe allergy.  The patient understands that if the infection seems to be worsening or is not improving, they are to call. Azithromycin Pregnancy And Lactation Text: This medication is considered safe during pregnancy and is also secreted in breast milk. Topical Clindamycin Pregnancy And Lactation Text: This medication is Pregnancy Category B and is considered safe during pregnancy. It is unknown if it is excreted in breast milk. Humira Counseling:  I discussed with the patient the risks of adalimumab including but not limited to myelosuppression, immunosuppression, autoimmune hepatitis, demyelinating diseases, lymphoma, and serious infections.  The patient understands that monitoring is required including a PPD at baseline and must alert us or the primary physician if symptoms of infection or other concerning signs are noted. Calcipotriene Counseling:  I discussed with the patient the risks of calcipotriene including but not limited to erythema, scaling, itching, and irritation. Mirvaso Pregnancy And Lactation Text: This medication has not been assigned a Pregnancy Risk Category. It is unknown if the medication is excreted in breast milk. Odomzo Counseling- I discussed with the patient the risks of Odomzo including but not limited to nausea, vomiting, diarrhea, constipation, weight loss, changes in the sense of taste, decreased appetite, muscle spasms, and hair loss.  The patient verbalized understanding of the proper use and possible adverse effects of Odomzo.  All of the patient's questions and concerns were addressed. Dapsone Counseling: I discussed with the patient the risks of dapsone including but not limited to hemolytic anemia, agranulocytosis, rashes, methemoglobinemia, kidney failure, peripheral neuropathy, headaches, GI upset, and liver toxicity.  Patients who start dapsone require monitoring including baseline LFTs and weekly CBCs for the first month, then every month thereafter.  The patient verbalized understanding of the proper use and possible adverse effects of dapsone.  All of the patient's questions and concerns were addressed. Tremfya Counseling: I discussed with the patient the risks of guselkumab including but not limited to immunosuppression, serious infections, worsening of inflammatory bowel disease and drug reactions.  The patient understands that monitoring is required including a PPD at baseline and must alert us or the primary physician if symptoms of infection or other concerning signs are noted. Calcipotriene Pregnancy And Lactation Text: This medication has not been proven safe during pregnancy. It is unknown if this medication is excreted in breast milk. Hydroxyzine Pregnancy And Lactation Text: This medication is not safe during pregnancy and should not be taken. It is also excreted in breast milk and breast feeding isn't recommended. Topical Sulfur Applications Counseling: Topical Sulfur Counseling: Patient counseled that this medication may cause skin irritation or allergic reactions.  In the event of skin irritation, the patient was advised to reduce the amount of the drug applied or use it less frequently.   The patient verbalized understanding of the proper use and possible adverse effects of topical sulfur application.  All of the patient's questions and concerns were addressed. Picato Counseling:  I discussed with the patient the risks of Picato including but not limited to erythema, scaling, itching, weeping, crusting, and pain. Fluconazole Counseling:  Patient counseled regarding adverse effects of fluconazole including but not limited to headache, diarrhea, nausea, upset stomach, liver function test abnormalities, taste disturbance, and stomach pain.  There is a rare possibility of liver failure that can occur when taking fluconazole.  The patient understands that monitoring of LFTs and kidney function test may be required, especially at baseline. The patient verbalized understanding of the proper use and possible adverse effects of fluconazole.  All of the patient's questions and concerns were addressed. Dapsone Pregnancy And Lactation Text: This medication is Pregnancy Category C and is not considered safe during pregnancy or breast feeding. Valtrex Pregnancy And Lactation Text: this medication is Pregnancy Category B and is considered safe during pregnancy. This medication is not directly found in breast milk but it's metabolite acyclovir is present. Prednisone Counseling:  I discussed with the patient the risks of prolonged use of prednisone including but not limited to weight gain, insomnia, osteoporosis, mood changes, diabetes, susceptibility to infection, glaucoma and high blood pressure.  In cases where prednisone use is prolonged, patients should be monitored with blood pressure checks, serum glucose levels and an eye exam.  Additionally, the patient may need to be placed on GI prophylaxis, PCP prophylaxis, and calcium and vitamin D supplementation and/or a bisphosphonate.  The patient verbalized understanding of the proper use and the possible adverse effects of prednisone.  All of the patient's questions and concerns were addressed. Bactrim Counseling:  I discussed with the patient the risks of sulfa antibiotics including but not limited to GI upset, allergic reaction, drug rash, diarrhea, dizziness, photosensitivity, and yeast infections.  Rarely, more serious reactions can occur including but not limited to aplastic anemia, agranulocytosis, methemoglobinemia, blood dyscrasias, liver or kidney failure, lung infiltrates or desquamative/blistering drug rashes. 5-Fu Counseling: 5-Fluorouracil Counseling:  I discussed with the patient the risks of 5-fluorouracil including but not limited to erythema, scaling, itching, weeping, crusting, and pain. Topical Sulfur Applications Pregnancy And Lactation Text: This medication is Pregnancy Category C and has an unknown safety profile during pregnancy. It is unknown if this topical medication is excreted in breast milk. Ilumya Counseling: I discussed with the patient the risks of tildrakizumab including but not limited to immunosuppression, malignancy, posterior leukoencephalopathy syndrome, and serious infections.  The patient understands that monitoring is required including a PPD at baseline and must alert us or the primary physician if symptoms of infection or other concerning signs are noted. Otezla Pregnancy And Lactation Text: This medication is Pregnancy Category C and it isn't known if it is safe during pregnancy. It is unknown if it is excreted in breast milk. Bactrim Pregnancy And Lactation Text: This medication is Pregnancy Category D and is known to cause fetal risk.  It is also excreted in breast milk. Otezla Counseling: The side effects of Otezla were discussed with the patient, including but not limited to worsening or new depression, weight loss, diarrhea, nausea, upper respiratory tract infection, and headache. Patient instructed to call the office should any adverse effect occur.  The patient verbalized understanding of the proper use and possible adverse effects of Otezla.  All the patient's questions and concerns were addressed. Erivedge Counseling- I discussed with the patient the risks of Erivedge including but not limited to nausea, vomiting, diarrhea, constipation, weight loss, changes in the sense of taste, decreased appetite, muscle spasms, and hair loss.  The patient verbalized understanding of the proper use and possible adverse effects of Erivedge.  All of the patient's questions and concerns were addressed. Albendazole Counseling:  I discussed with the patient the risks of albendazole including but not limited to cytopenia, kidney damage, nausea/vomiting and severe allergy.  The patient understands that this medication is being used in an off-label manner. Wartpeel Counseling:  I discussed with the patient the risks of Wartpeel including but not limited to erythema, scaling, itching, weeping, crusting, and pain. Finasteride Counseling:  I discussed with the patient the risks of use of finasteride including but not limited to decreased libido, decreased ejaculate volume, gynecomastia, and depression. Women should not handle medication.  All of the patient's questions and concerns were addressed. Oxybutynin Counseling:  I discussed with the patient the risks of oxybutynin including but not limited to skin rash, drowsiness, dry mouth, difficulty urinating, and blurred vision. Protopic Pregnancy And Lactation Text: This medication is Pregnancy Category C. It is unknown if this medication is excreted in breast milk when applied topically. Griseofulvin Counseling:  I discussed with the patient the risks of griseofulvin including but not limited to photosensitivity, cytopenia, liver damage, nausea/vomiting and severe allergy.  The patient understands that this medication is best absorbed when taken with a fatty meal (e.g., ice cream or french fries). Protopic Counseling: Patient may experience a mild burning sensation during topical application. Protopic is not approved in children less than 2 years of age. There have been case reports of hematologic and skin malignancies in patients using topical calcineurin inhibitors although causality is questionable. Cephalexin Counseling: I counseled the patient regarding use of cephalexin as an antibiotic for prophylactic and/or therapeutic purposes. Cephalexin (commonly prescribed under brand name Keflex) is a cephalosporin antibiotic which is active against numerous classes of bacteria, including most skin bacteria. Side effects may include nausea, diarrhea, gastrointestinal upset, rash, hives, yeast infections, and in rare cases, hepatitis, kidney disease, seizures, fever, confusion, neurologic symptoms, and others. Patients with severe allergies to penicillin medications are cautioned that there is about a 10% incidence of cross-reactivity with cephalosporins. When possible, patients with penicillin allergies should use alternatives to cephalosporins for antibiotic therapy. Xeljanz Counseling: I discussed with the patient the risks of Xeljanz therapy including increased risk of infection, liver issues, headache, diarrhea, or cold symptoms. Live vaccines should be avoided. They were instructed to call if they have any problems. Infliximab Counseling:  I discussed with the patient the risks of infliximab including but not limited to myelosuppression, immunosuppression, autoimmune hepatitis, demyelinating diseases, lymphoma, and serious infections.  The patient understands that monitoring is required including a PPD at baseline and must alert us or the primary physician if symptoms of infection or other concerning signs are noted. Finasteride Pregnancy And Lactation Text: This medication is absolutely contraindicated during pregnancy. It is unknown if it is excreted in breast milk. Xeltomasaz Pregnancy And Lactation Text: This medication is Pregnancy Category D and is not considered safe during pregnancy.  The risk during breast feeding is also uncertain. Drysol Counseling:  I discussed with the patient the risks of drysol/aluminum chloride including but not limited to skin rash, itching, irritation, burning. Rhofade Counseling: Rhofade is a topical medication which can decrease superficial blood flow where applied. Side effects are uncommon and include stinging, redness and allergic reactions. Albendazole Pregnancy And Lactation Text: This medication is Pregnancy Category C and it isn't known if it is safe during pregnancy. It is also excreted in breast milk. Ivermectin Counseling:  Patient instructed to take medication on an empty stomach with a full glass of water.  Patient informed of potential adverse effects including but not limited to nausea, diarrhea, dizziness, itching, and swelling of the extremities or lymph nodes.  The patient verbalized understanding of the proper use and possible adverse effects of ivermectin.  All of the patient's questions and concerns were addressed. Cephalexin Pregnancy And Lactation Text: This medication is Pregnancy Category B and considered safe during pregnancy.  It is also excreted in breast milk but can be used safely for shorter doses. Griseofulvin Pregnancy And Lactation Text: This medication is Pregnancy Category X and is known to cause serious birth defects. It is unknown if this medication is excreted in breast milk but breast feeding should be avoided. Acitretin Counseling:  I discussed with the patient the risks of acitretin including but not limited to hair loss, dry lips/skin/eyes, liver damage, hyperlipidemia, depression/suicidal ideation, photosensitivity.  Serious rare side effects can include but are not limited to pancreatitis, pseudotumor cerebri, bony changes, clot formation/stroke/heart attack.  Patient understands that alcohol is contraindicated since it can result in liver toxicity and significantly prolong the elimination of the drug by many years. Drysol Pregnancy And Lactation Text: This medication is considered safe during pregnancy and breast feeding. Propranolol Counseling:  I discussed with the patient the risks of propranolol including but not limited to low heart rate, low blood pressure, low blood sugar, restlessness and increased cold sensitivity. They should call the office if they experience any of these side effects. Bexarotene Counseling:  I discussed with the patient the risks of bexarotene including but not limited to hair loss, dry lips/skin/eyes, liver abnormalities, hyperlipidemia, pancreatitis, depression/suicidal ideation, photosensitivity, drug rash/allergic reactions, hypothyroidism, anemia, leukopenia, infection, cataracts, and teratogenicity.  Patient understands that they will need regular blood tests to check lipid profile, liver function tests, white blood cell count, thyroid function tests and pregnancy test if applicable. Clindamycin Pregnancy And Lactation Text: This medication can be used in pregnancy if certain situations. Clindamycin is also present in breast milk. Acitretin Pregnancy And Lactation Text: This medication is Pregnancy Category X and should not be given to women who are pregnant or may become pregnant in the future. This medication is excreted in breast milk. Clindamycin Counseling: I counseled the patient regarding use of clindamycin as an antibiotic for prophylactic and/or therapeutic purposes. Clindamycin is active against numerous classes of bacteria, including skin bacteria. Side effects may include nausea, diarrhea, gastrointestinal upset, rash, hives, yeast infections, and in rare cases, colitis. Gabapentin Counseling: I discussed with the patient the risks of gabapentin including but not limited to dizziness, somnolence, fatigue and ataxia. Zyclara Counseling:  I discussed with the patient the risks of imiquimod including but not limited to erythema, scaling, itching, weeping, crusting, and pain.  Patient understands that the inflammatory response to imiquimod is variable from person to person and was educated regarded proper titration schedule.  If flu-like symptoms develop, patient knows to discontinue the medication and contact us. Itraconazole Counseling:  I discussed with the patient the risks of itraconazole including but not limited to liver damage, nausea/vomiting, neuropathy, and severe allergy.  The patient understands that this medication is best absorbed when taken with acidic beverages such as non-diet cola or ginger ale.  The patient understands that monitoring is required including baseline LFTs and repeat LFTs at intervals.  The patient understands that they are to contact us or the primary physician if concerning signs are noted. Xolair Counseling:  Patient informed of potential adverse effects including but not limited to fever, muscle aches, rash and allergic reactions.  The patient verbalized understanding of the proper use and possible adverse effects of Xolair.  All of the patient's questions and concerns were addressed. Erythromycin Pregnancy And Lactation Text: This medication is Pregnancy Category B and is considered safe during pregnancy. It is also excreted in breast milk. Rituxan Counseling:  I discussed with the patient the risks of Rituxan infusions. Side effects can include infusion reactions, severe drug rashes including mucocutaneous reactions, reactivation of latent hepatitis and other infections and rarely progressive multifocal leukoencephalopathy.  All of the patient's questions and concerns were addressed. Doxycycline Counseling:  Patient counseled regarding possible photosensitivity and increased risk for sunburn.  Patient instructed to avoid sunlight, if possible.  When exposed to sunlight, patients should wear protective clothing, sunglasses, and sunscreen.  The patient was instructed to call the office immediately if the following severe adverse effects occur:  hearing changes, easy bruising/bleeding, severe headache, or vision changes.  The patient verbalized understanding of the proper use and possible adverse effects of doxycycline.  All of the patient's questions and concerns were addressed. Metronidazole Counseling:  I discussed with the patient the risks of metronidazole including but not limited to seizures, nausea/vomiting, a metallic taste in the mouth, nausea/vomiting and severe allergy. Bexarotene Pregnancy And Lactation Text: This medication is Pregnancy Category X and should not be given to women who are pregnant or may become pregnant. This medication should not be used if you are breast feeding. Propranolol Pregnancy And Lactation Text: This medication is Pregnancy Category C and it isn't known if it is safe during pregnancy. It is excreted in breast milk. Xolair Pregnancy And Lactation Text: This medication is Pregnancy Category B and is considered safe during pregnancy. This medication is excreted in breast milk. Elidel Counseling: Patient may experience a mild burning sensation during topical application. Elidel is not approved in children less than 2 years of age. There have been case reports of hematologic and skin malignancies in patients using topical calcineurin inhibitors although causality is questionable. Solaraze Counseling:  I discussed with the patient the risks of Solaraze including but not limited to erythema, scaling, itching, weeping, crusting, and pain. Ketoconazole Counseling:   Patient counseled regarding improving absorption with orange juice.  Adverse effects include but are not limited to breast enlargement, headache, diarrhea, nausea, upset stomach, liver function test abnormalities, taste disturbance, and stomach pain.  There is a rare possibility of liver failure that can occur when taking ketoconazole. The patient understands that monitoring of LFTs may be required, especially at baseline. The patient verbalized understanding of the proper use and possible adverse effects of ketoconazole.  All of the patient's questions and concerns were addressed. Solaraze Pregnancy And Lactation Text: This medication is Pregnancy Category B and is considered safe. There is some data to suggest avoiding during the third trimester. It is unknown if this medication is excreted in breast milk. Doxycycline Pregnancy And Lactation Text: This medication is Pregnancy Category D and not consider safe during pregnancy. It is also excreted in breast milk but is considered safe for shorter treatment courses. Metronidazole Pregnancy And Lactation Text: This medication is Pregnancy Category B and considered safe during pregnancy.  It is also excreted in breast milk. Isotretinoin Counseling: Patient should get monthly blood tests, not donate blood, not drive at night if vision affected, not share medication, and not undergo elective surgery for 6 months after tx completed. Side effects reviewed, pt to contact office should one occur. Eucrisa Counseling: Patient may experience a mild burning sensation during topical application. Eucrisa is not approved in children less than 2 years of age. Birth Control Pills Counseling: Birth Control Pill Counseling: I discussed with the patient the potential side effects of OCPs including but not limited to increased risk of stroke, heart attack, thrombophlebitis, deep venous thrombosis, hepatic adenomas, breast changes, GI upset, headaches, and depression.  The patient verbalized understanding of the proper use and possible adverse effects of OCPs. All of the patient's questions and concerns were addressed. Rituxan Pregnancy And Lactation Text: This medication is Pregnancy Category C and it isn't know if it is safe during pregnancy. It is unknown if this medication is excreted in breast milk but similar antibodies are known to be excreted. Glycopyrrolate Counseling:  I discussed with the patient the risks of glycopyrrolate including but not limited to skin rash, drowsiness, dry mouth, difficulty urinating, and blurred vision. Erythromycin Counseling:  I discussed with the patient the risks of erythromycin including but not limited to GI upset, allergic reaction, drug rash, diarrhea, increase in liver enzymes, and yeast infections. Isotretinoin Pregnancy And Lactation Text: This medication is Pregnancy Category X and is considered extremely dangerous during pregnancy. It is unknown if it is excreted in breast milk. Ketoconazole Pregnancy And Lactation Text: This medication is Pregnancy Category C and it isn't know if it is safe during pregnancy. It is also excreted in breast milk and breast feeding isn't recommended. Opioid Pregnancy And Lactation Text: These medications can lead to premature delivery and should be avoided during pregnancy. These medications are also present in breast milk in small amounts. Minocycline Counseling: Patient advised regarding possible photosensitivity and discoloration of the teeth, skin, lips, tongue and gums.  Patient instructed to avoid sunlight, if possible.  When exposed to sunlight, patients should wear protective clothing, sunglasses, and sunscreen.  The patient was instructed to call the office immediately if the following severe adverse effects occur:  hearing changes, easy bruising/bleeding, severe headache, or vision changes.  The patient verbalized understanding of the proper use and possible adverse effects of minocycline.  All of the patient's questions and concerns were addressed. Glycopyrrolate Pregnancy And Lactation Text: This medication is Pregnancy Category B and is considered safe during pregnancy. It is unknown if it is excreted breast milk. Opioid Counseling: I discussed with the patient the potential side effects of opioids including but not limited to addiction, altered mental status, and depression. I stressed avoiding alcohol, benzodiazepines, muscle relaxants and sleep aids unless specifically okayed by a physician. The patient verbalized understanding of the proper use and possible adverse effects of opioids. All of the patient's questions and concerns were addressed. They were instructed to flush the remaining pills down the toilet if they did not need them for pain. Siliq Counseling:  I discussed with the patient the risks of Siliq including but not limited to new or worsening depression, suicidal thoughts and behavior, immunosuppression, malignancy, posterior leukoencephalopathy syndrome, and serious infections.  The patient understands that monitoring is required including a PPD at baseline and must alert us or the primary physician if symptoms of infection or other concerning signs are noted. There is also a special program designed to monitor depression which is required with Siliq. Azathioprine Counseling:  I discussed with the patient the risks of azathioprine including but not limited to myelosuppression, immunosuppression, hepatotoxicity, lymphoma, and infections.  The patient understands that monitoring is required including baseline LFTs, Creatinine, possible TPMP genotyping and weekly CBCs for the first month and then every 2 weeks thereafter.  The patient verbalized understanding of the proper use and possible adverse effects of azathioprine.  All of the patient's questions and concerns were addressed. Birth Control Pills Pregnancy And Lactation Text: This medication should be avoided if pregnant and for the first 30 days post-partum. Topical Retinoid counseling:  Patient advised to apply a pea-sized amount only at bedtime and wait 30 minutes after washing their face before applying.  If too drying, patient may add a non-comedogenic moisturizer. The patient verbalized understanding of the proper use and possible adverse effects of retinoids.  All of the patient's questions and concerns were addressed.

## 2024-01-15 NOTE — ASSESSMENT & PLAN NOTE
Presents with tachycardia/tachypnea and nursing home reports of recurrent fevers over the last few days  Etiology suspected to be secondary to a small developing right lower extremity lightheadedness coupled with influenza A (plan for individual assessments)  Monitor vitals and maintain hemodynamics - keep MAP > 65 -> currently at goal, and in the setting of chronic systolic CHF, IV fluid resuscitation not indicated  Blood cultures from 1/13 remain negative thus far - urinalysis negative for infection - CXR, on personal review, without focal infiltrates but mild bilateral reticular-type opacities vs vascular congestion (known CHF history)  Lactic acid normal - procalcitonin negative x 2

## 2024-01-15 NOTE — PLAN OF CARE
Problem: Alteration in Orientation  Goal: Treatment Goal: Demonstrate a reduction of confusion and improved orientation to person, place, time and/or situation upon discharge, according to optimum baseline/ability  Outcome: Progressing  Goal: Express concerns related to confused thinking related to:  Description: Interventions:  - Encourage patient to express feelings, fears, frustrations, hopes  - Assign consistent caregivers   - [facility]/re-orient patient as needed  - Allow comfort items, as appropriate  - Provide visual cues, signs, etc.   Outcome: Progressing  Goal: Allow medical examinations, as recommended  Description: Interventions:  - Provide physical/neurological exams and/or referrals, per provider   Outcome: Progressing  Goal: Cooperate with recommended testing/procedures  Description: Interventions:  - Determine need for ancillary testing  - Observe for mental status changes  - Implement falls/precaution protocol   Outcome: Progressing  Goal: Attend and participate in unit activities, including therapeutic, recreational, and educational groups  Description: Interventions:  - Provide therapeutic and educational activities daily, encourage attendance and participation, and document same in the medical record   - Provide appropriate opportunities for reminiscence   - Provide a consistent daily routine   - Encourage family contact/visitation   Outcome: Progressing  Goal: Complete daily ADLs, including personal hygiene independently, as able  Description: Interventions:  - Observe, teach, and assist patient with ADLS  Outcome: Progressing

## 2024-01-15 NOTE — PHYSICAL THERAPY NOTE
Physical Therapy Cancellation Note       01/15/24 8480   PT Last Visit   PT Visit Date 01/15/24   Note Type   Note type Cancelled Session   Cancel Reasons Medical status   Additional Comments PT orders received and chart reviewed.  Pt currently poorly responsive, unable to follow instructions, and in waist/wrist restaints.  Evaluation is not appropriate at this time.  Will follow up at a later date.

## 2024-01-15 NOTE — OCCUPATIONAL THERAPY NOTE
Occupational Therapy Cx Note     Patient Name: Tracy Rock  Today's Date: 1/15/2024  Problem List  Principal Problem:    Sepsis (HCC)  Active Problems:    Atrial fibrillation (HCC)    Essential hypertension    Type 2 diabetes mellitus (HCC)    GERD (gastroesophageal reflux disease)    Dementia (HCC)    Influenza A    Cellulitis of right lower extremity    Hypomagnesemia    Hyperlipidemia    History of breast cancer    Chronic systolic heart failure (HCC)            01/15/24 1542   OT Last Visit   OT Visit Date 01/15/24   Note Type   Note type Cancelled Session   Additional Comments OT orders received. Chart reviewed. Pt currently in restraints. Not appropriate for OT session at this time. Will hold and follow when able and appropriate.              Maria Luisa Mclaughlin, OTR/L

## 2024-01-15 NOTE — PLAN OF CARE
Problem: Potential for Falls  Goal: Patient will remain free of falls  Description: INTERVENTIONS:  - Educate patient/family on patient safety including physical limitations  - Instruct patient to call for assistance with activity   - Consult OT/PT to assist with strengthening/mobility   - Keep Call bell within reach  - Keep bed low and locked with side rails adjusted as appropriate  - Keep care items and personal belongings within reach  - Initiate and maintain comfort rounds  - Make Fall Risk Sign visible to staff  - Offer Toileting every 2  Hours, in advance of need  - Initiate/Maintain bed alarm  - Obtain necessary fall risk management equipment:   - Apply yellow socks and bracelet for high fall risk patients  - Consider moving patient to room near nurses station  Outcome: Progressing         Problem: Prexisting or High Potential for Compromised Skin Integrity  Goal: Skin integrity is maintained or improved  Description: INTERVENTIONS:  - Identify patients at risk for skin breakdown  - Assess and monitor skin integrity  - Assess and monitor nutrition and hydration status  - Monitor labs   - Assess for incontinence   - Turn and reposition patient  - Assist with mobility/ambulation  - Relieve pressure over bony prominences  - Avoid friction and shearing  - Provide appropriate hygiene as needed including keeping skin clean and dry  - Evaluate need for skin moisturizer/barrier cream  - Collaborate with interdisciplinary team   - Patient/family teaching  - Consider wound care consult   Outcome: Progressing      Problem: NEUROSENSORY - ADULT  Goal: Achieves stable or improved neurological status  Description: INTERVENTIONS  - Monitor and report changes in neurological status  - Monitor vital signs such as temperature, blood pressure, glucose, and any other labs ordered   - Initiate measures to prevent increased intracranial pressure  - Monitor for seizure activity and implement precautions if appropriate       Outcome: Progressing     Problem: NEUROSENSORY - ADULT  Goal: Achieves maximal functionality and self care  Description: INTERVENTIONS  - Monitor swallowing and airway patency with patient fatigue and changes in neurological status  - Encourage and assist patient to increase activity and self care.   - Encourage visually impaired, hearing impaired and aphasic patients to use assistive/communication devices  Outcome: Progressing         Problem: MUSCULOSKELETAL - ADULT  Goal: Maintain proper alignment of affected body part  Description: INTERVENTIONS:  - Support, maintain and protect limb and body alignment  - Provide patient/ family with appropriate education  Outcome: Progressing       Problem: INFECTION - ADULT  Goal: Absence or prevention of progression during hospitalization  Description: INTERVENTIONS:  - Assess and monitor for signs and symptoms of infection  - Monitor lab/diagnostic results  - Monitor all insertion sites, i.e. indwelling lines, tubes, and drains  - Monitor endotracheal if appropriate and nasal secretions for changes in amount and color  - [place] appropriate cooling/warming therapies per order  - Administer medications as ordered  - Instruct and encourage patient and family to use good hand hygiene technique  - Identify and instruct in appropriate isolation precautions for identified infection/condition  Outcome: Progressing  Goal: Absence of fever/infection during neutropenic period  Description: INTERVENTIONS:  - Monitor WBC    Outcome: Progressing

## 2024-01-15 NOTE — ASSESSMENT & PLAN NOTE
Supportive care  Continue Buspar/Lexapro/Zyprexa and PRN Ativan  Has required intermittent course of restraints

## 2024-01-16 LAB
ALBUMIN SERPL BCP-MCNC: 3.1 G/DL (ref 3.5–5)
ALP SERPL-CCNC: 68 U/L (ref 34–104)
ALT SERPL W P-5'-P-CCNC: 13 U/L (ref 7–52)
ANION GAP SERPL CALCULATED.3IONS-SCNC: 8 MMOL/L
AST SERPL W P-5'-P-CCNC: 49 U/L (ref 13–39)
BASOPHILS # BLD AUTO: 0.03 THOUSANDS/ÂΜL (ref 0–0.1)
BASOPHILS NFR BLD AUTO: 1 % (ref 0–1)
BILIRUB SERPL-MCNC: 0.75 MG/DL (ref 0.2–1)
BUN SERPL-MCNC: 16 MG/DL (ref 5–25)
CALCIUM ALBUM COR SERPL-MCNC: 10.8 MG/DL (ref 8.3–10.1)
CALCIUM SERPL-MCNC: 10.1 MG/DL (ref 8.4–10.2)
CHLORIDE SERPL-SCNC: 100 MMOL/L (ref 96–108)
CO2 SERPL-SCNC: 31 MMOL/L (ref 21–32)
CREAT SERPL-MCNC: 0.59 MG/DL (ref 0.6–1.3)
EOSINOPHIL # BLD AUTO: 0.13 THOUSAND/ÂΜL (ref 0–0.61)
EOSINOPHIL NFR BLD AUTO: 3 % (ref 0–6)
ERYTHROCYTE [DISTWIDTH] IN BLOOD BY AUTOMATED COUNT: 13.6 % (ref 11.6–15.1)
GFR SERPL CREATININE-BSD FRML MDRD: 86 ML/MIN/1.73SQ M
GLUCOSE SERPL-MCNC: 109 MG/DL (ref 65–140)
GLUCOSE SERPL-MCNC: 114 MG/DL (ref 65–140)
GLUCOSE SERPL-MCNC: 116 MG/DL (ref 65–140)
GLUCOSE SERPL-MCNC: 122 MG/DL (ref 65–140)
GLUCOSE SERPL-MCNC: 131 MG/DL (ref 65–140)
HCT VFR BLD AUTO: 53.2 % (ref 34.8–46.1)
HGB BLD-MCNC: 17.6 G/DL (ref 11.5–15.4)
IMM GRANULOCYTES # BLD AUTO: 0.02 THOUSAND/UL (ref 0–0.2)
IMM GRANULOCYTES NFR BLD AUTO: 0 % (ref 0–2)
LYMPHOCYTES # BLD AUTO: 1.68 THOUSANDS/ÂΜL (ref 0.6–4.47)
LYMPHOCYTES NFR BLD AUTO: 32 % (ref 14–44)
MAGNESIUM SERPL-MCNC: 1.5 MG/DL (ref 1.9–2.7)
MCH RBC QN AUTO: 32.4 PG (ref 26.8–34.3)
MCHC RBC AUTO-ENTMCNC: 33.1 G/DL (ref 31.4–37.4)
MCV RBC AUTO: 98 FL (ref 82–98)
MONOCYTES # BLD AUTO: 0.76 THOUSAND/ÂΜL (ref 0.17–1.22)
MONOCYTES NFR BLD AUTO: 15 % (ref 4–12)
NEUTROPHILS # BLD AUTO: 2.61 THOUSANDS/ÂΜL (ref 1.85–7.62)
NEUTS SEG NFR BLD AUTO: 49 % (ref 43–75)
NRBC BLD AUTO-RTO: 0 /100 WBCS
PLATELET # BLD AUTO: 195 THOUSANDS/UL (ref 149–390)
PMV BLD AUTO: 11.3 FL (ref 8.9–12.7)
POTASSIUM SERPL-SCNC: 3.7 MMOL/L (ref 3.5–5.3)
PROT SERPL-MCNC: 6.5 G/DL (ref 6.4–8.4)
RBC # BLD AUTO: 5.44 MILLION/UL (ref 3.81–5.12)
SODIUM SERPL-SCNC: 139 MMOL/L (ref 135–147)
WBC # BLD AUTO: 5.23 THOUSAND/UL (ref 4.31–10.16)

## 2024-01-16 PROCEDURE — 85025 COMPLETE CBC W/AUTO DIFF WBC: CPT | Performed by: INTERNAL MEDICINE

## 2024-01-16 PROCEDURE — 80053 COMPREHEN METABOLIC PANEL: CPT | Performed by: INTERNAL MEDICINE

## 2024-01-16 PROCEDURE — 99232 SBSQ HOSP IP/OBS MODERATE 35: CPT | Performed by: INTERNAL MEDICINE

## 2024-01-16 PROCEDURE — 82948 REAGENT STRIP/BLOOD GLUCOSE: CPT

## 2024-01-16 PROCEDURE — 83735 ASSAY OF MAGNESIUM: CPT | Performed by: INTERNAL MEDICINE

## 2024-01-16 RX ORDER — SODIUM CHLORIDE, SODIUM LACTATE, POTASSIUM CHLORIDE, CALCIUM CHLORIDE 600; 310; 30; 20 MG/100ML; MG/100ML; MG/100ML; MG/100ML
100 INJECTION, SOLUTION INTRAVENOUS CONTINUOUS
Status: DISCONTINUED | OUTPATIENT
Start: 2024-01-16 | End: 2024-01-19

## 2024-01-16 RX ADMIN — ESCITALOPRAM OXALATE 10 MG: 10 TABLET ORAL at 09:10

## 2024-01-16 RX ADMIN — OSELTAMIVIR PHOSPHATE 75 MG: 75 CAPSULE ORAL at 21:51

## 2024-01-16 RX ADMIN — FAMOTIDINE 20 MG: 20 TABLET ORAL at 09:10

## 2024-01-16 RX ADMIN — ATORVASTATIN CALCIUM 10 MG: 10 TABLET, FILM COATED ORAL at 17:31

## 2024-01-16 RX ADMIN — Medication 2000 UNITS: at 09:10

## 2024-01-16 RX ADMIN — FUROSEMIDE 20 MG: 20 TABLET ORAL at 09:10

## 2024-01-16 RX ADMIN — OLANZAPINE 2.5 MG: 2.5 TABLET, FILM COATED ORAL at 09:10

## 2024-01-16 RX ADMIN — APIXABAN 5 MG: 5 TABLET, FILM COATED ORAL at 09:11

## 2024-01-16 RX ADMIN — CEFAZOLIN SODIUM 2000 MG: 2 SOLUTION INTRAVENOUS at 21:52

## 2024-01-16 RX ADMIN — SODIUM CHLORIDE, SODIUM LACTATE, POTASSIUM CHLORIDE, AND CALCIUM CHLORIDE 50 ML/HR: .6; .31; .03; .02 INJECTION, SOLUTION INTRAVENOUS at 21:52

## 2024-01-16 RX ADMIN — CEFAZOLIN SODIUM 2000 MG: 2 SOLUTION INTRAVENOUS at 14:31

## 2024-01-16 RX ADMIN — METOPROLOL SUCCINATE 50 MG: 50 TABLET, EXTENDED RELEASE ORAL at 09:10

## 2024-01-16 RX ADMIN — BUSPIRONE HYDROCHLORIDE 5 MG: 5 TABLET ORAL at 21:51

## 2024-01-16 RX ADMIN — APIXABAN 5 MG: 5 TABLET, FILM COATED ORAL at 17:31

## 2024-01-16 RX ADMIN — OSELTAMIVIR PHOSPHATE 75 MG: 75 CAPSULE ORAL at 09:10

## 2024-01-16 RX ADMIN — LISINOPRIL 10 MG: 10 TABLET ORAL at 09:10

## 2024-01-16 RX ADMIN — BUSPIRONE HYDROCHLORIDE 5 MG: 5 TABLET ORAL at 09:11

## 2024-01-16 RX ADMIN — CEFAZOLIN SODIUM 2000 MG: 2 SOLUTION INTRAVENOUS at 05:08

## 2024-01-16 NOTE — OCCUPATIONAL THERAPY NOTE
Occupational Therapy Cx Note     Patient Name: Tracy Rock  Today's Date: 1/16/2024  Problem List  Principal Problem:    Sepsis (HCC)  Active Problems:    Atrial fibrillation (HCC)    Essential hypertension    Type 2 diabetes mellitus (HCC)    GERD (gastroesophageal reflux disease)    Dementia (HCC)    Influenza A    Cellulitis of right lower extremity    Hypomagnesemia    Hyperlipidemia    History of breast cancer    Chronic systolic heart failure (HCC)            01/16/24 1410   OT Last Visit   OT Visit Date 01/16/24   Note Type   Note type Cancelled Session   Additional Comments Pt remains in restraints. Will hold and follow when able and appropriate.           Maria Luisa Mclaughlin, OTR/L

## 2024-01-16 NOTE — CASE MANAGEMENT
Case Management Progress Note    Patient name Tracy Rock  Location /-01 MRN [id number]  : 1940 Date 2024       LOS (days): 3  Geometric Mean LOS (GMLOS) (days): 3.6  Days to GMLOS:0.7        OBJECTIVE:        Current admission status: Inpatient  Preferred Pharmacy:   [id number] - [place], [place] - [address] [place] [id number]  Phone: [phone] Fax: [phone]    [facility] - [facility] [address] [place] [address]  [address] [place] [address]  [address] [place] [address]  Phone: [phone] Fax: [phone]    Primary Care Provider: Bertha Isaac DO    Primary Insurance: MEDICARE  Secondary Insurance: [facility]    PROGRESS NOTE:    Cm attempted to contact patient's son to review role of Cm. Cm only able to leave a voice message asking for a return call back to discuss discharge planning. Cm will follow.

## 2024-01-16 NOTE — PLAN OF CARE
Problem: Potential for Falls  Goal: Patient will remain free of falls  Description: INTERVENTIONS:  - Educate patient/family on patient safety including physical limitations  - Instruct patient to call for assistance with activity   - Consult OT/PT to assist with strengthening/mobility   - Keep Call bell within reach  - Keep bed low and locked with side rails adjusted as appropriate  - Keep care items and personal belongings within reach  - Initiate and maintain comfort rounds  - Make Fall Risk Sign visible to staff  - Offer Toileting every  Hours, in advance of need  - Initiate/Maintain alarm  - Obtain necessary fall risk management equipment:   - Apply yellow socks and bracelet for high fall risk patients  - Consider moving patient to room near nurses station  Outcome: Progressing     Problem: Prexisting or High Potential for Compromised Skin Integrity  Goal: Skin integrity is maintained or improved  Description: INTERVENTIONS:  - Identify patients at risk for skin breakdown  - Assess and monitor skin integrity  - Assess and monitor nutrition and hydration status  - Monitor labs   - Assess for incontinence   - Turn and reposition patient  - Assist with mobility/ambulation  - Relieve pressure over bony prominences  - Avoid friction and shearing  - Provide appropriate hygiene as needed including keeping skin clean and dry  - Evaluate need for skin moisturizer/barrier cream  - Collaborate with interdisciplinary team   - Patient/family teaching  - Consider wound care consult   Outcome: Progressing     Problem: NEUROSENSORY - ADULT  Goal: Achieves stable or improved neurological status  Description: INTERVENTIONS  - Monitor and report changes in neurological status  - Monitor vital signs such as temperature, blood pressure, glucose, and any other labs ordered   - Initiate measures to prevent increased intracranial pressure  - Monitor for seizure activity and implement precautions if appropriate      Outcome:  Progressing  Goal: Achieves maximal functionality and self care  Description: INTERVENTIONS  - Monitor swallowing and airway patency with patient fatigue and changes in neurological status  - Encourage and assist patient to increase activity and self care.   - Encourage visually impaired, hearing impaired and aphasic patients to use assistive/communication devices  Outcome: Progressing     Problem: CARDIOVASCULAR - ADULT  Goal: Maintains optimal cardiac output and hemodynamic stability  Description: INTERVENTIONS:  - Monitor I/O, vital signs and rhythm  - Monitor for S/S and trends of decreased cardiac output  - Administer and titrate ordered vasoactive medications to optimize hemodynamic stability  - Assess quality of pulses, skin color and temperature  - Assess for signs of decreased coronary artery perfusion  - Instruct patient to report change in severity of symptoms  Outcome: Progressing  Goal: Absence of cardiac dysrhythmias or at baseline rhythm  Description: INTERVENTIONS:  - Continuous cardiac monitoring, vital signs, obtain 12 lead EKG if ordered  - Administer antiarrhythmic and heart rate control medications as ordered  - Monitor electrolytes and administer replacement therapy as ordered  Outcome: Progressing     Problem: RESPIRATORY - ADULT  Goal: Achieves optimal ventilation and oxygenation  Description: INTERVENTIONS:  - Assess for changes in respiratory status  - Assess for changes in mentation and behavior  - Position to facilitate oxygenation and minimize respiratory effort  - Oxygen administered by appropriate delivery if ordered  - Initiate smoking cessation education as indicated  - Encourage broncho-pulmonary hygiene including cough, deep breathe, Incentive Spirometry  - Assess the need for suctioning and aspirate as needed  - Assess and instruct to report SOB or any respiratory difficulty  - Respiratory Therapy support as indicated  Outcome: Progressing     Problem: GENITOURINARY - ADULT  Goal:  Maintains or returns to baseline urinary function  Description: INTERVENTIONS:  - Assess urinary function  - Encourage oral fluids to ensure adequate hydration if ordered  - Administer IV fluids as ordered to ensure adequate hydration  - Administer ordered medications as needed  - Offer frequent toileting  - Follow urinary retention protocol if ordered  Outcome: Progressing  Goal: Absence of urinary retention  Description: INTERVENTIONS:  - Assess patient’s ability to void and empty bladder  - Monitor I/O  - Bladder scan as needed  - Discuss with physician/AP medications to alleviate retention as needed  - Discuss catheterization for long term situations as appropriate  Outcome: Progressing     Problem: MUSCULOSKELETAL - ADULT  Goal: Maintain or return mobility to safest level of function  Description: INTERVENTIONS:  - Assess patient's ability to carry out ADLs; assess patient's baseline for ADL function and identify physical deficits which impact ability to perform ADLs (bathing, care of mouth/teeth, toileting, grooming, dressing, etc.)  - Assess/evaluate cause of self-care deficits   - Assess range of motion  - Assess patient's mobility  - Assess patient's need for assistive devices and provide as appropriate  - Encourage maximum independence but intervene and supervise when necessary  - Involve family in performance of ADLs  - Assess for home care needs following discharge   - Consider OT consult to assist with ADL evaluation and planning for discharge  - Provide patient education as appropriate  Outcome: Progressing  Goal: Maintain proper alignment of affected body part  Description: INTERVENTIONS:  - Support, maintain and protect limb and body alignment  - Provide patient/ family with appropriate education  Outcome: Progressing     Problem: INFECTION - ADULT  Goal: Absence or prevention of progression during hospitalization  Description: INTERVENTIONS:  - Assess and monitor for signs and symptoms of  infection  - Monitor lab/diagnostic results  - Monitor all insertion sites, i.e. indwelling lines, tubes, and drains  - Monitor endotracheal if appropriate and nasal secretions for changes in amount and color  - [place] appropriate cooling/warming therapies per order  - Administer medications as ordered  - Instruct and encourage patient and family to use good hand hygiene technique  - Identify and instruct in appropriate isolation precautions for identified infection/condition  Outcome: Progressing  Goal: Absence of fever/infection during neutropenic period  Description: INTERVENTIONS:  - Monitor WBC    Outcome: Progressing     Problem: SAFETY ADULT  Goal: Patient will remain free of falls  Description: INTERVENTIONS:  - Educate patient/family on patient safety including physical limitations  - Instruct patient to call for assistance with activity   - Consult OT/PT to assist with strengthening/mobility   - Keep Call bell within reach  - Keep bed low and locked with side rails adjusted as appropriate  - Keep care items and personal belongings within reach  - Initiate and maintain comfort rounds  - Make Fall Risk Sign visible to staff  - Offer Toileting every  Hours, in advance of need  - Initiate/Maintain alarm  - Obtain necessary fall risk management equipment:   - Apply yellow socks and bracelet for high fall risk patients  - Consider moving patient to room near nurses station  Outcome: Progressing  Goal: Maintain or return to baseline ADL function  Description: INTERVENTIONS:  -  Assess patient's ability to carry out ADLs; assess patient's baseline for ADL function and identify physical deficits which impact ability to perform ADLs (bathing, care of mouth/teeth, toileting, grooming, dressing, etc.)  - Assess/evaluate cause of self-care deficits   - Assess range of motion  - Assess patient's mobility; develop plan if impaired  - Assess patient's need for assistive devices and provide as appropriate  - Encourage  maximum independence but intervene and supervise when necessary  - Involve family in performance of ADLs  - Assess for home care needs following discharge   - Consider OT consult to assist with ADL evaluation and planning for discharge  - Provide patient education as appropriate  Outcome: Progressing  Goal: Maintains/Returns to pre admission functional level  Description: INTERVENTIONS:  - Perform AM-PAC 6 Click Basic Mobility/ Daily Activity assessment daily.  - Set and communicate daily mobility goal to care team and patient/family/caregiver.   - Collaborate with rehabilitation services on mobility goals if consulted  - Perform Range of Motion  times a day.  - Reposition patient every  hours.  - Dangle patient  times a day  - Stand patient  times a day  - Ambulate patient  times a day  - Out of bed to chair  times a day   - Out of bed for meals times a day  - Out of bed for toileting  - Record patient progress and toleration of activity level   Outcome: Progressing     Problem: DISCHARGE PLANNING  Goal: Discharge to home or other facility with appropriate resources  Description: INTERVENTIONS:  - Identify barriers to discharge w/patient and caregiver  - Arrange for needed discharge resources and transportation as appropriate  - Identify discharge learning needs (meds, wound care, etc.)  - Arrange for interpretive services to assist at discharge as needed  - Refer to Case Management Department for coordinating discharge planning if the patient needs post-hospital services based on physician/advanced practitioner order or complex needs related to functional status, cognitive ability, or social support system  Outcome: Progressing     Problem: Knowledge Deficit  Goal: Patient/family/caregiver demonstrates understanding of disease process, treatment plan, medications, and discharge instructions  Description: Complete learning assessment and assess knowledge base.  Interventions:  - Provide teaching at level of  understanding  - Provide teaching via preferred learning methods  Outcome: Progressing     Problem: Alteration in Orientation  Goal: Treatment Goal: Demonstrate a reduction of confusion and improved orientation to person, place, time and/or situation upon discharge, according to optimum baseline/ability  Outcome: Progressing  Goal: Express concerns related to confused thinking related to:  Description: Interventions:  - Encourage patient to express feelings, fears, frustrations, hopes  - Assign consistent caregivers   - [place]/re-orient patient as needed  - Allow comfort items, as appropriate  - Provide visual cues, signs, etc.   Outcome: Progressing  Goal: Allow medical examinations, as recommended  Description: Interventions:  - Provide physical/neurological exams and/or referrals, per provider   Outcome: Progressing  Goal: Cooperate with recommended testing/procedures  Description: Interventions:  - Determine need for ancillary testing  - Observe for mental status changes  - Implement falls/precaution protocol   Outcome: Progressing  Goal: Attend and participate in unit activities, including therapeutic, recreational, and educational groups  Description: Interventions:  - Provide therapeutic and educational activities daily, encourage attendance and participation, and document same in the medical record   - Provide appropriate opportunities for reminiscence   - Provide a consistent daily routine   - Encourage family contact/visitation   Outcome: Progressing  Goal: Complete daily ADLs, including personal hygiene independently, as able  Description: Interventions:  - Observe, teach, and assist patient with ADLS  Outcome: Progressing

## 2024-01-17 PROBLEM — D58.2 ELEVATED HEMOGLOBIN (HCC): Status: ACTIVE | Noted: 2024-01-17

## 2024-01-17 PROBLEM — D75.1 POLYCYTHEMIA: Status: ACTIVE | Noted: 2024-01-01

## 2024-01-17 PROBLEM — G93.40 ACUTE ENCEPHALOPATHY: Status: ACTIVE | Noted: 2024-01-01

## 2024-01-17 LAB
ALBUMIN SERPL BCP-MCNC: 3.1 G/DL (ref 3.5–5)
ALP SERPL-CCNC: 68 U/L (ref 34–104)
ALT SERPL W P-5'-P-CCNC: 13 U/L (ref 7–52)
ANION GAP SERPL CALCULATED.3IONS-SCNC: 10 MMOL/L
AST SERPL W P-5'-P-CCNC: 47 U/L (ref 13–39)
BASOPHILS # BLD AUTO: 0.03 THOUSANDS/ÂΜL (ref 0–0.1)
BASOPHILS NFR BLD AUTO: 0 % (ref 0–1)
BILIRUB SERPL-MCNC: 0.82 MG/DL (ref 0.2–1)
BUN SERPL-MCNC: 15 MG/DL (ref 5–25)
CALCIUM ALBUM COR SERPL-MCNC: 10.5 MG/DL (ref 8.3–10.1)
CALCIUM SERPL-MCNC: 9.8 MG/DL (ref 8.4–10.2)
CHLORIDE SERPL-SCNC: 101 MMOL/L (ref 96–108)
CO2 SERPL-SCNC: 27 MMOL/L (ref 21–32)
CREAT SERPL-MCNC: 0.64 MG/DL (ref 0.6–1.3)
EOSINOPHIL # BLD AUTO: 0.07 THOUSAND/ÂΜL (ref 0–0.61)
EOSINOPHIL NFR BLD AUTO: 1 % (ref 0–6)
ERYTHROCYTE [DISTWIDTH] IN BLOOD BY AUTOMATED COUNT: 13.4 % (ref 11.6–15.1)
EST. AVERAGE GLUCOSE BLD GHB EST-MCNC: 171 MG/DL
GFR SERPL CREATININE-BSD FRML MDRD: 83 ML/MIN/1.73SQ M
GLUCOSE SERPL-MCNC: 108 MG/DL (ref 65–140)
GLUCOSE SERPL-MCNC: 127 MG/DL (ref 65–140)
GLUCOSE SERPL-MCNC: 135 MG/DL (ref 65–140)
GLUCOSE SERPL-MCNC: 140 MG/DL (ref 65–140)
GLUCOSE SERPL-MCNC: 145 MG/DL (ref 65–140)
HBA1C MFR BLD: 7.6 %
HCT VFR BLD AUTO: 55.2 % (ref 34.8–46.1)
HGB BLD-MCNC: 18 G/DL (ref 11.5–15.4)
IMM GRANULOCYTES # BLD AUTO: 0.01 THOUSAND/UL (ref 0–0.2)
IMM GRANULOCYTES NFR BLD AUTO: 0 % (ref 0–2)
LYMPHOCYTES # BLD AUTO: 1.87 THOUSANDS/ÂΜL (ref 0.6–4.47)
LYMPHOCYTES NFR BLD AUTO: 27 % (ref 14–44)
MAGNESIUM SERPL-MCNC: 1.4 MG/DL (ref 1.9–2.7)
MCH RBC QN AUTO: 31.9 PG (ref 26.8–34.3)
MCHC RBC AUTO-ENTMCNC: 32.6 G/DL (ref 31.4–37.4)
MCV RBC AUTO: 98 FL (ref 82–98)
MONOCYTES # BLD AUTO: 0.87 THOUSAND/ÂΜL (ref 0.17–1.22)
MONOCYTES NFR BLD AUTO: 12 % (ref 4–12)
NEUTROPHILS # BLD AUTO: 4.2 THOUSANDS/ÂΜL (ref 1.85–7.62)
NEUTS SEG NFR BLD AUTO: 60 % (ref 43–75)
NRBC BLD AUTO-RTO: 0 /100 WBCS
PLATELET # BLD AUTO: 190 THOUSANDS/UL (ref 149–390)
PMV BLD AUTO: 11.5 FL (ref 8.9–12.7)
POTASSIUM SERPL-SCNC: 3.8 MMOL/L (ref 3.5–5.3)
PROT SERPL-MCNC: 6.7 G/DL (ref 6.4–8.4)
RBC # BLD AUTO: 5.65 MILLION/UL (ref 3.81–5.12)
SODIUM SERPL-SCNC: 138 MMOL/L (ref 135–147)
TSH SERPL DL<=0.05 MIU/L-ACNC: 1.81 UIU/ML (ref 0.45–4.5)
WBC # BLD AUTO: 7.05 THOUSAND/UL (ref 4.31–10.16)

## 2024-01-17 PROCEDURE — 84443 ASSAY THYROID STIM HORMONE: CPT | Performed by: FAMILY MEDICINE

## 2024-01-17 PROCEDURE — 99285 EMERGENCY DEPT VISIT HI MDM: CPT | Performed by: EMERGENCY MEDICINE

## 2024-01-17 PROCEDURE — 85025 COMPLETE CBC W/AUTO DIFF WBC: CPT | Performed by: INTERNAL MEDICINE

## 2024-01-17 PROCEDURE — 83735 ASSAY OF MAGNESIUM: CPT | Performed by: INTERNAL MEDICINE

## 2024-01-17 PROCEDURE — 99232 SBSQ HOSP IP/OBS MODERATE 35: CPT | Performed by: INTERNAL MEDICINE

## 2024-01-17 PROCEDURE — 82948 REAGENT STRIP/BLOOD GLUCOSE: CPT

## 2024-01-17 PROCEDURE — 80053 COMPREHEN METABOLIC PANEL: CPT | Performed by: INTERNAL MEDICINE

## 2024-01-17 PROCEDURE — 99223 1ST HOSP IP/OBS HIGH 75: CPT | Performed by: FAMILY MEDICINE

## 2024-01-17 PROCEDURE — 83036 HEMOGLOBIN GLYCOSYLATED A1C: CPT | Performed by: FAMILY MEDICINE

## 2024-01-17 RX ORDER — GABAPENTIN 100 MG/1
100 CAPSULE ORAL 2 TIMES DAILY
Status: DISCONTINUED | OUTPATIENT
Start: 2024-01-17 | End: 2024-01-18

## 2024-01-17 RX ORDER — LANOLIN ALCOHOL/MO/W.PET/CERES
3 CREAM (GRAM) TOPICAL
Status: DISCONTINUED | OUTPATIENT
Start: 2024-01-17 | End: 2024-01-18

## 2024-01-17 RX ORDER — ACETAMINOPHEN 325 MG/1
975 TABLET ORAL EVERY 8 HOURS
Status: DISCONTINUED | OUTPATIENT
Start: 2024-01-17 | End: 2024-01-20

## 2024-01-17 RX ORDER — MAGNESIUM SULFATE HEPTAHYDRATE 40 MG/ML
2 INJECTION, SOLUTION INTRAVENOUS ONCE
Status: COMPLETED | OUTPATIENT
Start: 2024-01-17 | End: 2024-01-17

## 2024-01-17 RX ORDER — GUAIFENESIN 100 MG/5ML
200 SOLUTION ORAL EVERY 4 HOURS PRN
Status: DISCONTINUED | OUTPATIENT
Start: 2024-01-17 | End: 2024-01-20

## 2024-01-17 RX ADMIN — APIXABAN 5 MG: 5 TABLET, FILM COATED ORAL at 17:48

## 2024-01-17 RX ADMIN — CEFAZOLIN SODIUM 2000 MG: 2 SOLUTION INTRAVENOUS at 14:35

## 2024-01-17 RX ADMIN — FUROSEMIDE 20 MG: 20 TABLET ORAL at 08:14

## 2024-01-17 RX ADMIN — OLANZAPINE 2.5 MG: 2.5 TABLET, FILM COATED ORAL at 08:13

## 2024-01-17 RX ADMIN — LISINOPRIL 10 MG: 10 TABLET ORAL at 08:13

## 2024-01-17 RX ADMIN — FAMOTIDINE 20 MG: 20 TABLET ORAL at 08:12

## 2024-01-17 RX ADMIN — Medication 2000 UNITS: at 08:13

## 2024-01-17 RX ADMIN — CEFAZOLIN SODIUM 2000 MG: 2 SOLUTION INTRAVENOUS at 06:11

## 2024-01-17 RX ADMIN — ACETAMINOPHEN 975 MG: 325 TABLET, FILM COATED ORAL at 17:49

## 2024-01-17 RX ADMIN — BUSPIRONE HYDROCHLORIDE 5 MG: 5 TABLET ORAL at 08:12

## 2024-01-17 RX ADMIN — ESCITALOPRAM OXALATE 10 MG: 10 TABLET ORAL at 08:13

## 2024-01-17 RX ADMIN — APIXABAN 5 MG: 5 TABLET, FILM COATED ORAL at 08:13

## 2024-01-17 RX ADMIN — OSELTAMIVIR PHOSPHATE 75 MG: 75 CAPSULE ORAL at 20:19

## 2024-01-17 RX ADMIN — ATORVASTATIN CALCIUM 10 MG: 10 TABLET, FILM COATED ORAL at 17:48

## 2024-01-17 RX ADMIN — Medication 3 MG: at 20:19

## 2024-01-17 RX ADMIN — METOPROLOL SUCCINATE 50 MG: 50 TABLET, EXTENDED RELEASE ORAL at 08:14

## 2024-01-17 RX ADMIN — MAGNESIUM SULFATE HEPTAHYDRATE 2 G: 40 INJECTION, SOLUTION INTRAVENOUS at 11:55

## 2024-01-17 RX ADMIN — OSELTAMIVIR PHOSPHATE 75 MG: 75 CAPSULE ORAL at 08:14

## 2024-01-17 RX ADMIN — ACETAMINOPHEN 975 MG: 325 TABLET, FILM COATED ORAL at 11:55

## 2024-01-17 RX ADMIN — GABAPENTIN 100 MG: 100 CAPSULE ORAL at 17:49

## 2024-01-17 NOTE — ASSESSMENT & PLAN NOTE
Severe dementia with behavioral disturbance at baseline  Patient needs assistance with all IADLs and most of her ADLs  Will continue to provide supportive care, reorient as needed.  Patient is at high risk for delirium, will monitor closely and place on delirium precautions.  Maintain sleep/wake cycle.  Optimize pain regimen.  Monitor for constipation and urinary retention and manage as needed.  B12 level and TSH within normal limits  Encourage family to visit.  Encourage to wear glasses and hearing aids while awake.  Encourage po intake, assist with feeding if needed.   Discontinued Lexapro gabapentin and Zyprexa at this time due to hypoactive delirium- will continue to hold medications for now   Hold melatonin 3 mg nightly

## 2024-01-17 NOTE — PROGRESS NOTES
Counts include 234 beds at the [facility]  Progress Note  Name: Tracy Rock I  MRN: [id number]  Unit/Bed#: -01 I Date of Admission: 1/13/2024   Date of Service: 1/16/2024 I Hospital Day: 3    Assessment/Plan   Chronic systolic heart failure (HCC)  Assessment & Plan  Last EF of 20-25% in October 2021  On diuresis with Lasix and beta-blockade with Toprol-XL  Low-sodium diet with fluid restriction enforced  Monitor/replete serum potassium/magnesium as necessary    History of breast cancer  Assessment & Plan  Status post left mastectomy  Outpatient follow-up    Hyperlipidemia  Assessment & Plan  Continue statin    Hypomagnesemia  Assessment & Plan  Monitor/replete serum magnesium and potassium    Cellulitis of right lower extremity  Assessment & Plan  Given a dose of IV Cefepime in the ED -> de-escalated to IV Ancef  Preliminary read from right lower extremity venous duplex study negative for DVT     Influenza A  Assessment & Plan  Contact/droplet precautions  Continue tamiflu   Supportive care  Maintain oxygenation    Dementia (HCC)  Assessment & Plan  Supportive care  Continue Buspar/Lexapro/Zyprexa and PRN Ativan  Has required intermittent course of restraints    GERD (gastroesophageal reflux disease)  Assessment & Plan  Continue Pepcid    Type 2 diabetes mellitus (HCC)  Assessment & Plan  Lab Results   Component Value Date    HGBA1C 7.4 (H) 03/17/2023     Diet controlled at home  Continue SSI coverage per [facility]ks while hospitalized  Carbohydrate restriction  Hypoglycemia protocol    Essential hypertension  Assessment & Plan  Low sodium restriction  Continue Zestril/Toprol-XL - additional PRN IV Hydralazine on board    Atrial fibrillation (HCC)  Assessment & Plan  Chronic rate controlled with Toprol-XL  On Eliquis for anticoagulation  [id number] score of 8    * Sepsis (HCC)  Assessment & Plan  Presents with tachycardia/tachypnea and nursing home reports of recurrent fevers over the last few days  Etiology  suspected to be secondary to a small developing right lower extremity lightheadedness coupled with influenza A (plan for individual assessments)  Monitor vitals and maintain hemodynamics - keep MAP > 65 -> currently at goal, and in the setting of chronic systolic CHF, IV fluid resuscitation not indicated  Blood cultures from 1/13 remain negative thus far - urinalysis negative for infection - CXR, on personal review, without focal infiltrates but mild bilateral reticular-type opacities vs vascular congestion (known CHF history)  Lactic acid normal - procalcitonin negative x 2              VTE Pharmacologic Prophylaxis:   Moderate Risk (Score 3-4) - Pharmacological DVT Prophylaxis Ordered: apixaban (Eliquis).    Mobility:   Basic Mobility Inpatient Raw Score: 6  JH-HLM Goal: 2: Bed activities/Dependent transfer  JH-HLM Achieved: 2: Bed activities/Dependent transfer  HLM Goal NOT achieved. Continue with multidisciplinary rounding and encourage appropriate mobility to improve upon HLM goals.    Patient Centered Rounds: I performed bedside rounds with nursing staff today.   Discussions with Specialists or Other Care Team Provider: staff    Education and Discussions with Family / Patient: Attempted to update  (son) via phone. Unable to contact.    Total Time Spent on Date of Encounter in care of patient: 45 mins. This time was spent on one or more of the following: performing physical exam; counseling and coordination of care; obtaining or reviewing history; documenting in the medical record; reviewing/ordering tests, medications or procedures; communicating with other healthcare professionals and discussing with patient's family/caregivers.    Current Length of Stay: 3 day(s)  Current Patient Status: Inpatient   Certification Statement: The patient will continue to require additional inpatient hospital stay due to dementia , confusion   Discharge Plan: Anticipate discharge in >72 hrs to rehab  facility.    Code Status: Level 1 - Full Code    Subjective:   Pt is confused , with restraints for safety , has no fever     Objective:     Vitals:   Temp (24hrs), Av.7 °F (36.5 °C), Min:97.5 °F (36.4 °C), Max:98.2 °F (36.8 °C)    Temp:  [97.5 °F (36.4 °C)-98.2 °F (36.8 °C)] 97.5 °F (36.4 °C)  HR:  [67-98] 67  Resp:  [18-20] 19  BP: (102-127)/(59-95) 102/59  SpO2:  [91 %-98 %] 91 %  Body mass index is 31.77 kg/m².     Input and Output Summary (last 24 hours):     Intake/Output Summary (Last 24 hours) at 2024  Last data filed at 2024 1045  Gross per 24 hour   Intake 80 ml   Output 350 ml   Net -270 ml       Physical Exam:   Physical Exam   HEENT-PERRLA, dry  oral mucosa  Neck-supple, no JVD elevation   Respiratory-equal air entry bilaterally, no rales or rhonchi  Cardiovascular system-S1, S2 heard, no murmur or gallops or rubs  Abdomen-soft, nontender, no guarding or rigidity, bowel sounds heard  Extremities-no pedal edema  Peripheral pulses palpable  Musculoskeletal-no contractures  Central nervous system-confused , moving extremities   Skin-no rash noted       Additional Data:     Labs:  Results from last 7 days   Lab Units 24  0451   WBC Thousand/uL 5.23   HEMOGLOBIN g/dL 17.6*   HEMATOCRIT % 53.2*   PLATELETS Thousands/uL 195   NEUTROS PCT % 49   LYMPHS PCT % 32   MONOS PCT % 15*   EOS PCT % 3     Results from last 7 days   Lab Units 24  0451   SODIUM mmol/L 139   POTASSIUM mmol/L 3.7   CHLORIDE mmol/L 100   CO2 mmol/L 31   BUN mg/dL 16   CREATININE mg/dL 0.59*   ANION GAP mmol/L 8   CALCIUM mg/dL 10.1   ALBUMIN g/dL 3.1*   TOTAL BILIRUBIN mg/dL 0.75   ALK PHOS U/L 68   ALT U/L 13   AST U/L 49*   GLUCOSE RANDOM mg/dL 109         Results from last 7 days   Lab Units 24  1627 24  1148 24  0706 01/15/24  2151 01/15/24  1618 01/15/24  1124 01/15/24  0709 24  2034 24  1605 24  1122 24  0722 248   POC GLUCOSE mg/dl 131 122 116 89 158*  137 80 103 97 93 86 92         Results from last 7 days   Lab Units 01/14/24  0458 01/13/24  1417   LACTIC ACID mmol/L  --  1.4   PROCALCITONIN ng/ml 0.06 0.07       Lines/Drains:  Invasive Devices       Peripheral Intravenous Line  Duration             Peripheral IV 01/13/24 Distal;Dorsal (posterior);Right Forearm 2 days                          Imaging: Personally reviewed the following imaging: chest xray    Recent Cultures (last 7 days):   Results from last 7 days   Lab Units 01/13/24  1600 01/13/24  1417   BLOOD CULTURE   --  No Growth at 48 hrs.  No Growth at 48 hrs.   URINE CULTURE  No Growth <1000 cfu/mL  --        Last 24 Hours Medication List:   Current Facility-Administered Medications   Medication Dose Route Frequency Provider Last Rate    acetaminophen  650 mg Oral Q6H PRN Charley Carrasco MD      apixaban  5 mg Oral BID Charley Carrasco MD      atorvastatin  10 mg Oral Daily With Dinner Charley Carrasco MD      busPIRone  5 mg Oral BID Charley Carrasco MD      cefazolin  2,000 mg Intravenous Q8H Charley Carrasco MD 2,000 mg (01/16/24 1431)    cholecalciferol  2,000 Units Oral Daily Charley Carrasco MD      dextromethorphan-guaiFENesin  10 mL Oral Q4H PRN Charley Carrasco MD      escitalopram  10 mg Oral Daily Charley Carrasco MD      famotidine  20 mg Oral Daily Charley Carrasco MD      furosemide  20 mg Oral Daily Charley Carrasco MD      hydrALAZINE  5 mg Intravenous Q6H PRN Charley Carrasco MD      insulin lispro  1-6 Units Subcutaneous 4x Daily (AC & HS) Charley Carrasco MD      lisinopril  10 mg Oral Daily Charley Carrasco MD      LORazepam  0.5 mg Oral Q8H PRN Charley Carrasco MD      metoprolol succinate  50 mg Oral Daily Charley Carrasco MD      OLANZapine  2.5 mg Oral Daily Charley Carrasco MD      oseltamivir  75 mg Oral Q12H CJ Charley Carrasco MD      oxyCODONE  2.5 mg Oral Q6H PRN Charley Carrasco MD      oxyCODONE  5 mg Oral Q6H PRN Charley Carrasco MD      trimethobenzamide  200 mg Intramuscular Q6H PRN Charley Carrasco MD          Today,  Patient Was Seen By: Devorah Salas MD    **Please Note: This note may have been constructed using a voice recognition system.**

## 2024-01-17 NOTE — ASSESSMENT & PLAN NOTE
Patient met the sepsis criteria on admission to the hospital  Vitals signs are stable  Completed antibiotic treatment  for right lower extremity cellulitis

## 2024-01-17 NOTE — ASSESSMENT & PLAN NOTE
Hemoglobin 15.6 trending down  Monitor CBC  CT chest abdomen and pelvis no acute pathology  Consider heme/onc consult

## 2024-01-17 NOTE — ED PROVIDER NOTES
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  Screening labs notable for being influenza A positive.  Chest x-ray shows no acute disease.  Unremarkable CBC and BMP.  Patient also with an area of what appears to be cellulitis on her right distal calf.  Treated with antibiotics.  Will admit for further monitoring of resolution of fevers.                  SBIRT 22yo+      Flowsheet Row Most Recent Value   Initial Alcohol Screen: US AUDIT-C     1. How often do you have a drink containing alcohol? 0 Filed at: 01/13/2024 1806   2. How many drinks containing alcohol do you have on a typical day you are drinking?  0 Filed at: 01/13/2024 1806   3b. FEMALE Any Age, or MALE 65+: How often do you have 4 or more drinks on one occassion? 0 Filed at: 01/13/2024 1806   Audit-C Score 0 Filed at: 01/13/2024 1806   CRYSTAL: How many times in the past year have you...    Used an illegal drug or used a prescription medication for non-medical reasons? Never Filed at: 01/13/2024 1806                      Medical Decision Making  Amount and/or Complexity of Data Reviewed  Labs: ordered.  Radiology: ordered.    Risk  Prescription drug management.  Decision regarding hospitalization.           Disposition  Final diagnoses:   Influenza A   Cellulitis     Time reflects when diagnosis was documented in both MDM as applicable and the Disposition within this note       Time User Action Codes Description Comment     1/13/2024  4:34 PM Anastasia Roa Add [J10.1] Influenza A     1/13/2024  4:34 PM Anastasia Roa Add [L03.90] Cellulitis     1/16/2024  8:50 PM Devorah Salas Add [F03.90] Dementia (HCC)     1/16/2024  8:50 PM Devorah Salas Add [R29.6] Recurrent falls           ED Disposition       ED Disposition   Admit    Condition   Stable    Date/Time   Sat Jan 13, 2024 1634    Comment   Case was discussed with Dr. Carrasco and the patient's admission status was agreed to be Admission Status: inpatient status to the service of Dr. Carrasco .               Follow-up Information    None         Current Discharge Medication List        CONTINUE these medications which have NOT CHANGED    Details   acetaminophen (TYLENOL) 325 mg tablet Take 975 mg by mouth 3 (three) times a day      apixaban (ELIQUIS) 5 mg Take 1 tablet (5 mg total) by mouth 2 (two) times a day  Qty: 180 tablet, Refills: 3    Associated Diagnoses: Permanent atrial fibrillation (HCC)      atorvastatin (LIPITOR) 10 mg tablet Take 10 mg by mouth daily      busPIRone (BUSPAR) 5 mg tablet Take 5 mg by mouth 2 (two) times a day      Cholecalciferol (Vitamin D3) 50 MCG (2000 UT) TABS Take 2,000 Units by mouth daily      escitalopram (LEXAPRO) 10 mg tablet Take 10 mg by mouth daily      famotidine (PEPCID) 20 mg tablet Take 20 mg by mouth daily      furosemide (LASIX) 20 mg tablet Take 20 mg by mouth daily      Lidocaine HCl (Aspercreme Lidocaine) 4 % CREA Apply 1 Application topically 3 (three) times a day      lisinopril (ZESTRIL) 10 mg tablet Take 10 mg by mouth daily      LORazepam (Ativan) 0.5 mg tablet Take 1 tablet (0.5 mg total) by mouth every 8 (eight) hours as needed for anxiety  Qty: 90 tablet, Refills: 0    Associated Diagnoses: Anxiety      metoprolol succinate (TOPROL-XL) 50 mg 24 hr tablet Take 1 tablet (50 mg total) by mouth daily  Qty: 30 tablet, Refills: 3    Associated Diagnoses: Permanent atrial fibrillation (HCC)      nystatin (MYCOSTATIN)  powder Apply topically 2 (two) times a day      OLANZapine (ZyPREXA) 2.5 mg tablet Take 2.5 mg by mouth 2 (two) times a day      oxyCODONE (ROXICODONE) 5 immediate release tablet Take 2.5 mg by mouth every 6 (six) hours as needed for severe pain             No discharge procedures on file.    PDMP Review         Value Time User    PDMP Reviewed  Yes 9/1/2023 12:40 PM MOISES Silva            ED Provider  Electronically Signed by             Anastasia Roa MD  01/18/24 0716

## 2024-01-17 NOTE — ASSESSMENT & PLAN NOTE
Currently rate controlled  Continue metoprolol and anticoagulation with Eliquis  Monitor electrolytes

## 2024-01-17 NOTE — ASSESSMENT & PLAN NOTE
Given a dose of IV Cefepime in the ED -> de-escalated to IV Ancef  Preliminary read from right lower extremity venous duplex study negative for DVT

## 2024-01-17 NOTE — ASSESSMENT & PLAN NOTE
Patient uses a walker for ambulation at baseline  Monitor orthostatic vital signs  Encourage p.o. hydration  Avoid hypotension and hypoglycemia   Continue PT OT
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
## 2024-01-17 NOTE — PLAN OF CARE
Problem: Potential for Falls  Goal: Patient will remain free of falls  Description: INTERVENTIONS:  - Educate patient/family on patient safety including physical limitations  - Instruct patient to call for assistance with activity   - Consult OT/PT to assist with strengthening/mobility   - Keep Call bell within reach  - Keep bed low and locked with side rails adjusted as appropriate  - Keep care items and personal belongings within reach  - Initiate and maintain comfort rounds  - Make Fall Risk Sign visible to staff  - Offer Toileting every 4 Hours, in advance of need  - Initiate/Maintain bed alarm  - Apply yellow socks and bracelet for high fall risk patients  - Consider moving patient to room near nurses station  Outcome: Progressing     Problem: Prexisting or High Potential for Compromised Skin Integrity  Goal: Skin integrity is maintained or improved  Description: INTERVENTIONS:  - Identify patients at risk for skin breakdown  - Assess and monitor skin integrity  - Assess and monitor nutrition and hydration status  - Monitor labs   - Assess for incontinence   - Turn and reposition patient  - Assist with mobility/ambulation  - Relieve pressure over bony prominences  - Avoid friction and shearing  - Provide appropriate hygiene as needed including keeping skin clean and dry  - Evaluate need for skin moisturizer/barrier cream  - Collaborate with interdisciplinary team   - Patient/family teaching  - Consider wound care consult   Outcome: Progressing     Problem: NEUROSENSORY - ADULT  Goal: Achieves stable or improved neurological status  Description: INTERVENTIONS  - Monitor and report changes in neurological status  - Monitor vital signs such as temperature, blood pressure, glucose, and any other labs ordered   - Initiate measures to prevent increased intracranial pressure  - Monitor for seizure activity and implement precautions if appropriate      Outcome: Progressing     Problem: RESPIRATORY - ADULT  Goal:  Achieves optimal ventilation and oxygenation  Description: INTERVENTIONS:  - Assess for changes in respiratory status  - Assess for changes in mentation and behavior  - Position to facilitate oxygenation and minimize respiratory effort  - Oxygen administered by appropriate delivery if ordered  - Initiate smoking cessation education as indicated  - Encourage broncho-pulmonary hygiene including cough, deep breathe, Incentive Spirometry  - Assess the need for suctioning and aspirate as needed  - Assess and instruct to report SOB or any respiratory difficulty  - Respiratory Therapy support as indicated  Outcome: Progressing     Problem: PAIN - ADULT  Goal: Verbalizes/displays adequate comfort level or baseline comfort level  Description: Interventions:  - Encourage patient to monitor pain and request assistance  - Assess pain using appropriate pain scale  - Administer analgesics based on type and severity of pain and evaluate response  - Implement non-pharmacological measures as appropriate and evaluate response  - Consider cultural and social influences on pain and pain management  - Notify physician/advanced practitioner if interventions unsuccessful or patient reports new pain  Outcome: Progressing     Problem: INFECTION - ADULT  Goal: Absence or prevention of progression during hospitalization  Description: INTERVENTIONS:  - Assess and monitor for signs and symptoms of infection  - Monitor lab/diagnostic results  - Monitor all insertion sites, i.e. indwelling lines, tubes, and drains  - Monitor endotracheal if appropriate and nasal secretions for changes in amount and color  - [place] appropriate cooling/warming therapies per order  - Administer medications as ordered  - Instruct and encourage patient and family to use good hand hygiene technique  - Identify and instruct in appropriate isolation precautions for identified infection/condition  Outcome: Progressing     Problem: SAFETY ADULT  Goal: Maintain or return  to baseline ADL function  Description: INTERVENTIONS:  -  Assess patient's ability to carry out ADLs; assess patient's baseline for ADL function and identify physical deficits which impact ability to perform ADLs (bathing, care of mouth/teeth, toileting, grooming, dressing, etc.)  - Assess/evaluate cause of self-care deficits   - Assess range of motion  - Assess patient's mobility; develop plan if impaired  - Assess patient's need for assistive devices and provide as appropriate  - Encourage maximum independence but intervene and supervise when necessary  - Involve family in performance of ADLs  - Assess for home care needs following discharge   - Consider OT consult to assist with ADL evaluation and planning for discharge  - Provide patient education as appropriate  Outcome: Progressing

## 2024-01-17 NOTE — OCCUPATIONAL THERAPY NOTE
Occupational Therapy Cx Note     Patient Name: Tracy Rock  Today's Date: 1/17/2024  Problem List  Principal Problem:    Sepsis (HCC)  Active Problems:    Atrial fibrillation (HCC)    Essential hypertension    Type 2 diabetes mellitus (HCC)    GERD (gastroesophageal reflux disease)    Dementia (HCC)    Influenza A    Cellulitis of right lower extremity    Hypomagnesemia    Hyperlipidemia    History of breast cancer    Chronic systolic heart failure (HCC)            01/17/24 1424   OT Last Visit   OT Visit Date 01/17/24   Note Type   Note type Cancelled Session   Additional Comments Attempted to see pt for OT evaluation. Pt in deep sleep. Attempted to arouse via sternal rub/ auditory stimulation, however unsuccessful. RN aware. Will attempt to follow at later time/date.           Maria Luisa Mclaughlin OTR/L

## 2024-01-17 NOTE — ASSESSMENT & PLAN NOTE
Lab Results   Component Value Date    HGBA1C 7.6 (H) 01/17/2024       Recent Labs     01/18/24  1610 01/18/24 2053 01/19/24  0720 01/19/24  1113   POCGLU 163* 138 101 110       Blood Sugar Average: Last 72 hrs:  (P) [id number]      Avoid hypoglycemia  Goal for hemoglobin A1c for patient age and comorbidities will be 8.5-9.0

## 2024-01-17 NOTE — ASSESSMENT & PLAN NOTE
Patient presented to the hospital with hypoactive delirium.  Per nursing staff she was agitated intermittently during her hospital stay and she required physical restraint.\  - hypoactive delirium at the time of encounter  -Patient is high risk of delirium due to dementia at baseline, change in environment, cellulitis and influenza A infection  - Tamiflu could have been contributing to her change in mental status  -continue delirium precautions  -maintain normal sleep/wake cycle-hold melatonin  -minimize overnight interruptions, group overnight vitals/labs/nursing checks as possible  -dim lights, close blinds and turn off tv to minimize stimulation and encourage sleep environment in evenings  -ensure that pain is well controlled continue Tylenol 975mg Q8H scheduled   -monitor for fecal and urinary retention which may precipitate delirium  -encourage early mobilization and ambulation  -provide frequent reorientation and redirection  -encourage family and friends at the bedside to help calm patient if anxious  -avoid medications which may precipitate or worsen delirium such as tramadol, benzodiazepine, anticholinergics, and antihistaminics  -encourage hydration and nutrition , assist with feeding if needed  -redirect unwanted behaviors as first line, avoid physical restraints.   -Continue aspiration precautions,  speech therapy follows  -disContinued Lexapro and Zyprexa as well as gabapentin.    -CT head showed no acute pathology  - if worsening or no improvement consider Neurology consult r/o possible seizure disorder

## 2024-01-17 NOTE — PHYSICAL THERAPY NOTE
Physical Therapy Cancellation Note     01/17/24 1339   PT Last Visit   PT Visit Date 01/17/24   Note Type   Note type Cancelled Session   Cancel Reasons Other   Additional Comments Pt seen sleeping in bed, vitals appropriate, no noted distress.  Pt unable to alerted/aroused to participate despite strong verbal/tactile stimulus or therapist attempt at repositioning pt in bed.  Will re-attempt at a later time when pt is more alert and able to participate.

## 2024-01-17 NOTE — ASSESSMENT & PLAN NOTE
Completed Tamiflu-which could have contributed to her acute encephalopathy  Patient saturates well on room air  Continue supportive care

## 2024-01-18 ENCOUNTER — APPOINTMENT (INPATIENT)
Dept: CT IMAGING | Facility: HOSPITAL | Age: 84
DRG: 871 | End: 2024-01-18
Payer: MEDICARE

## 2024-01-18 LAB
25(OH)D3 SERPL-MCNC: 39.7 NG/ML (ref 30–100)
ALBUMIN SERPL BCP-MCNC: 3.1 G/DL (ref 3.5–5)
ALP SERPL-CCNC: 66 U/L (ref 34–104)
ALT SERPL W P-5'-P-CCNC: 7 U/L (ref 7–52)
AMMONIA PLAS-SCNC: 52 UMOL/L (ref 18–72)
ANION GAP SERPL CALCULATED.3IONS-SCNC: 10 MMOL/L
AST SERPL W P-5'-P-CCNC: 37 U/L (ref 13–39)
BACTERIA BLD CULT: NORMAL
BACTERIA BLD CULT: NORMAL
BASE EX.OXY STD BLDV CALC-SCNC: 96 % (ref 60–80)
BASE EXCESS BLDV CALC-SCNC: 2.7 MMOL/L
BASOPHILS # BLD AUTO: 0.02 THOUSANDS/ÂΜL (ref 0–0.1)
BASOPHILS NFR BLD AUTO: 0 % (ref 0–1)
BILIRUB SERPL-MCNC: 1.03 MG/DL (ref 0.2–1)
BUN SERPL-MCNC: 18 MG/DL (ref 5–25)
CALCIUM ALBUM COR SERPL-MCNC: 10.7 MG/DL (ref 8.3–10.1)
CALCIUM SERPL-MCNC: 10 MG/DL (ref 8.4–10.2)
CHLORIDE SERPL-SCNC: 104 MMOL/L (ref 96–108)
CO2 SERPL-SCNC: 27 MMOL/L (ref 21–32)
CREAT SERPL-MCNC: 0.68 MG/DL (ref 0.6–1.3)
EOSINOPHIL # BLD AUTO: 0.04 THOUSAND/ÂΜL (ref 0–0.61)
EOSINOPHIL NFR BLD AUTO: 1 % (ref 0–6)
ERYTHROCYTE [DISTWIDTH] IN BLOOD BY AUTOMATED COUNT: 13.4 % (ref 11.6–15.1)
ERYTHROCYTE [DISTWIDTH] IN BLOOD BY AUTOMATED COUNT: 13.5 % (ref 11.6–15.1)
GFR SERPL CREATININE-BSD FRML MDRD: 82 ML/MIN/1.73SQ M
GLUCOSE SERPL-MCNC: 101 MG/DL (ref 65–140)
GLUCOSE SERPL-MCNC: 126 MG/DL (ref 65–140)
GLUCOSE SERPL-MCNC: 133 MG/DL (ref 65–140)
GLUCOSE SERPL-MCNC: 138 MG/DL (ref 65–140)
GLUCOSE SERPL-MCNC: 163 MG/DL (ref 65–140)
HCO3 BLDV-SCNC: 26.3 MMOL/L (ref 24–30)
HCT VFR BLD AUTO: 50.5 % (ref 34.8–46.1)
HCT VFR BLD AUTO: 51 % (ref 34.8–46.1)
HGB BLD-MCNC: 16.9 G/DL (ref 11.5–15.4)
HGB BLD-MCNC: 17.1 G/DL (ref 11.5–15.4)
IMM GRANULOCYTES # BLD AUTO: 0.03 THOUSAND/UL (ref 0–0.2)
IMM GRANULOCYTES NFR BLD AUTO: 0 % (ref 0–2)
LYMPHOCYTES # BLD AUTO: 2.37 THOUSANDS/ÂΜL (ref 0.6–4.47)
LYMPHOCYTES NFR BLD AUTO: 30 % (ref 14–44)
MAGNESIUM SERPL-MCNC: 1.8 MG/DL (ref 1.9–2.7)
MCH RBC QN AUTO: 32.4 PG (ref 26.8–34.3)
MCH RBC QN AUTO: 32.4 PG (ref 26.8–34.3)
MCHC RBC AUTO-ENTMCNC: 33.5 G/DL (ref 31.4–37.4)
MCHC RBC AUTO-ENTMCNC: 33.5 G/DL (ref 31.4–37.4)
MCV RBC AUTO: 97 FL (ref 82–98)
MCV RBC AUTO: 97 FL (ref 82–98)
MONOCYTES # BLD AUTO: 1.28 THOUSAND/ÂΜL (ref 0.17–1.22)
MONOCYTES NFR BLD AUTO: 16 % (ref 4–12)
NEUTROPHILS # BLD AUTO: 4.23 THOUSANDS/ÂΜL (ref 1.85–7.62)
NEUTS SEG NFR BLD AUTO: 53 % (ref 43–75)
NRBC BLD AUTO-RTO: 0 /100 WBCS
O2 CT BLDV-SCNC: 23 ML/DL
PCO2 BLDV: 37.4 MM HG (ref 42–50)
PH BLDV: 7.46 [PH] (ref 7.3–7.4)
PLATELET # BLD AUTO: 193 THOUSANDS/UL (ref 149–390)
PLATELET # BLD AUTO: 199 THOUSANDS/UL (ref 149–390)
PMV BLD AUTO: 11.3 FL (ref 8.9–12.7)
PMV BLD AUTO: 11.3 FL (ref 8.9–12.7)
PO2 BLDV: 88 MM HG (ref 35–45)
POTASSIUM SERPL-SCNC: 3.9 MMOL/L (ref 3.5–5.3)
PROT SERPL-MCNC: 6.7 G/DL (ref 6.4–8.4)
RBC # BLD AUTO: 5.21 MILLION/UL (ref 3.81–5.12)
RBC # BLD AUTO: 5.27 MILLION/UL (ref 3.81–5.12)
SODIUM SERPL-SCNC: 141 MMOL/L (ref 135–147)
VIT B12 SERPL-MCNC: 337 PG/ML (ref 180–914)
WBC # BLD AUTO: 7.61 THOUSAND/UL (ref 4.31–10.16)
WBC # BLD AUTO: 7.97 THOUSAND/UL (ref 4.31–10.16)

## 2024-01-18 PROCEDURE — 99233 SBSQ HOSP IP/OBS HIGH 50: CPT | Performed by: FAMILY MEDICINE

## 2024-01-18 PROCEDURE — 82948 REAGENT STRIP/BLOOD GLUCOSE: CPT

## 2024-01-18 PROCEDURE — 83735 ASSAY OF MAGNESIUM: CPT | Performed by: INTERNAL MEDICINE

## 2024-01-18 PROCEDURE — 71250 CT THORAX DX C-: CPT

## 2024-01-18 PROCEDURE — 82607 VITAMIN B-12: CPT | Performed by: FAMILY MEDICINE

## 2024-01-18 PROCEDURE — 70450 CT HEAD/BRAIN W/O DYE: CPT

## 2024-01-18 PROCEDURE — 82140 ASSAY OF AMMONIA: CPT | Performed by: INTERNAL MEDICINE

## 2024-01-18 PROCEDURE — G1004 CDSM NDSC: HCPCS

## 2024-01-18 PROCEDURE — 99232 SBSQ HOSP IP/OBS MODERATE 35: CPT | Performed by: INTERNAL MEDICINE

## 2024-01-18 PROCEDURE — 82306 VITAMIN D 25 HYDROXY: CPT | Performed by: FAMILY MEDICINE

## 2024-01-18 PROCEDURE — 87081 CULTURE SCREEN ONLY: CPT | Performed by: INTERNAL MEDICINE

## 2024-01-18 PROCEDURE — 74176 CT ABD & PELVIS W/O CONTRAST: CPT

## 2024-01-18 PROCEDURE — 92610 EVALUATE SWALLOWING FUNCTION: CPT

## 2024-01-18 PROCEDURE — 85025 COMPLETE CBC W/AUTO DIFF WBC: CPT | Performed by: INTERNAL MEDICINE

## 2024-01-18 PROCEDURE — 80053 COMPREHEN METABOLIC PANEL: CPT | Performed by: INTERNAL MEDICINE

## 2024-01-18 PROCEDURE — 82805 BLOOD GASES W/O2 SATURATION: CPT | Performed by: INTERNAL MEDICINE

## 2024-01-18 PROCEDURE — 85027 COMPLETE CBC AUTOMATED: CPT | Performed by: INTERNAL MEDICINE

## 2024-01-18 RX ORDER — MAGNESIUM SULFATE HEPTAHYDRATE 40 MG/ML
2 INJECTION, SOLUTION INTRAVENOUS ONCE
Status: COMPLETED | OUTPATIENT
Start: 2024-01-18 | End: 2024-01-18

## 2024-01-18 RX ORDER — POLYETHYLENE GLYCOL 3350 17 G/17G
17 POWDER, FOR SOLUTION ORAL DAILY
Status: DISCONTINUED | OUTPATIENT
Start: 2024-01-18 | End: 2024-01-20

## 2024-01-18 RX ADMIN — ACETAMINOPHEN 975 MG: 325 TABLET, FILM COATED ORAL at 10:10

## 2024-01-18 RX ADMIN — ATORVASTATIN CALCIUM 10 MG: 10 TABLET, FILM COATED ORAL at 17:39

## 2024-01-18 RX ADMIN — APIXABAN 5 MG: 5 TABLET, FILM COATED ORAL at 10:10

## 2024-01-18 RX ADMIN — ACETAMINOPHEN 975 MG: 325 TABLET, FILM COATED ORAL at 17:45

## 2024-01-18 RX ADMIN — OSELTAMIVIR PHOSPHATE 75 MG: 75 CAPSULE ORAL at 10:10

## 2024-01-18 RX ADMIN — METOPROLOL SUCCINATE 50 MG: 50 TABLET, EXTENDED RELEASE ORAL at 10:10

## 2024-01-18 RX ADMIN — LISINOPRIL 10 MG: 10 TABLET ORAL at 10:10

## 2024-01-18 RX ADMIN — POLYETHYLENE GLYCOL 3350 17 G: 17 POWDER, FOR SOLUTION ORAL at 17:39

## 2024-01-18 RX ADMIN — SODIUM CHLORIDE, SODIUM LACTATE, POTASSIUM CHLORIDE, AND CALCIUM CHLORIDE 100 ML/HR: .6; .31; .03; .02 INJECTION, SOLUTION INTRAVENOUS at 06:05

## 2024-01-18 RX ADMIN — SODIUM CHLORIDE, SODIUM LACTATE, POTASSIUM CHLORIDE, AND CALCIUM CHLORIDE 100 ML/HR: .6; .31; .03; .02 INJECTION, SOLUTION INTRAVENOUS at 22:05

## 2024-01-18 RX ADMIN — MAGNESIUM SULFATE HEPTAHYDRATE 2 G: 40 INJECTION, SOLUTION INTRAVENOUS at 11:14

## 2024-01-18 RX ADMIN — FAMOTIDINE 20 MG: 20 TABLET ORAL at 10:10

## 2024-01-18 RX ADMIN — INSULIN LISPRO 1 UNITS: 100 INJECTION, SOLUTION INTRAVENOUS; SUBCUTANEOUS at 17:42

## 2024-01-18 RX ADMIN — GABAPENTIN 100 MG: 100 CAPSULE ORAL at 10:10

## 2024-01-18 RX ADMIN — Medication 2000 UNITS: at 10:10

## 2024-01-18 RX ADMIN — OLANZAPINE 2.5 MG: 2.5 TABLET, FILM COATED ORAL at 10:10

## 2024-01-18 RX ADMIN — ESCITALOPRAM OXALATE 10 MG: 10 TABLET ORAL at 10:10

## 2024-01-18 RX ADMIN — APIXABAN 5 MG: 5 TABLET, FILM COATED ORAL at 17:39

## 2024-01-18 NOTE — PLAN OF CARE
Problem: Potential for Falls  Goal: Patient will remain free of falls  Description: INTERVENTIONS:  - Educate patient/family on patient safety including physical limitations  - Instruct patient to call for assistance with activity   - Consult OT/PT to assist with strengthening/mobility   - Keep Call bell within reach  - Keep bed low and locked with side rails adjusted as appropriate  - Keep care items and personal belongings within reach  - Initiate and maintain comfort rounds  - Make Fall Risk Sign visible to staff  - Offer Toileting every 2 Hours, in advance of need  - Initiate/Maintain alarm  - Obtain necessary fall risk management equipment:   - Apply yellow socks and bracelet for high fall risk patients  - Consider moving patient to room near nurses station  Outcome: Progressing     Problem: Prexisting or High Potential for Compromised Skin Integrity  Goal: Skin integrity is maintained or improved  Description: INTERVENTIONS:  - Identify patients at risk for skin breakdown  - Assess and monitor skin integrity  - Assess and monitor nutrition and hydration status  - Monitor labs   - Assess for incontinence   - Turn and reposition patient  - Assist with mobility/ambulation  - Relieve pressure over bony prominences  - Avoid friction and shearing  - Provide appropriate hygiene as needed including keeping skin clean and dry  - Evaluate need for skin moisturizer/barrier cream  - Collaborate with interdisciplinary team   - Patient/family teaching  - Consider wound care consult   Outcome: Progressing     Problem: NEUROSENSORY - ADULT  Goal: Achieves stable or improved neurological status  Description: INTERVENTIONS  - Monitor and report changes in neurological status  - Monitor vital signs such as temperature, blood pressure, glucose, and any other labs ordered   - Initiate measures to prevent increased intracranial pressure  - Monitor for seizure activity and implement precautions if appropriate      Outcome:  Progressing  Goal: Achieves maximal functionality and self care  Description: INTERVENTIONS  - Monitor swallowing and airway patency with patient fatigue and changes in neurological status  - Encourage and assist patient to increase activity and self care.   - Encourage visually impaired, hearing impaired and aphasic patients to use assistive/communication devices  Outcome: Progressing     Problem: CARDIOVASCULAR - ADULT  Goal: Maintains optimal cardiac output and hemodynamic stability  Description: INTERVENTIONS:  - Monitor I/O, vital signs and rhythm  - Monitor for S/S and trends of decreased cardiac output  - Administer and titrate ordered vasoactive medications to optimize hemodynamic stability  - Assess quality of pulses, skin color and temperature  - Assess for signs of decreased coronary artery perfusion  - Instruct patient to report change in severity of symptoms  Outcome: Progressing  Goal: Absence of cardiac dysrhythmias or at baseline rhythm  Description: INTERVENTIONS:  - Continuous cardiac monitoring, vital signs, obtain 12 lead EKG if ordered  - Administer antiarrhythmic and heart rate control medications as ordered  - Monitor electrolytes and administer replacement therapy as ordered  Outcome: Progressing     Problem: RESPIRATORY - ADULT  Goal: Achieves optimal ventilation and oxygenation  Description: INTERVENTIONS:  - Assess for changes in respiratory status  - Assess for changes in mentation and behavior  - Position to facilitate oxygenation and minimize respiratory effort  - Oxygen administered by appropriate delivery if ordered  - Initiate smoking cessation education as indicated  - Encourage broncho-pulmonary hygiene including cough, deep breathe, Incentive Spirometry  - Assess the need for suctioning and aspirate as needed  - Assess and instruct to report SOB or any respiratory difficulty  - Respiratory Therapy support as indicated  Outcome: Progressing     Problem: GENITOURINARY - ADULT  Goal:  Maintains or returns to baseline urinary function  Description: INTERVENTIONS:  - Assess urinary function  - Encourage oral fluids to ensure adequate hydration if ordered  - Administer IV fluids as ordered to ensure adequate hydration  - Administer ordered medications as needed  - Offer frequent toileting  - Follow urinary retention protocol if ordered  Outcome: Progressing  Goal: Absence of urinary retention  Description: INTERVENTIONS:  - Assess patient’s ability to void and empty bladder  - Monitor I/O  - Bladder scan as needed  - Discuss with physician/AP medications to alleviate retention as needed  - Discuss catheterization for long term situations as appropriate  Outcome: Progressing     Problem: MUSCULOSKELETAL - ADULT  Goal: Maintain or return mobility to safest level of function  Description: INTERVENTIONS:  - Assess patient's ability to carry out ADLs; assess patient's baseline for ADL function and identify physical deficits which impact ability to perform ADLs (bathing, care of mouth/teeth, toileting, grooming, dressing, etc.)  - Assess/evaluate cause of self-care deficits   - Assess range of motion  - Assess patient's mobility  - Assess patient's need for assistive devices and provide as appropriate  - Encourage maximum independence but intervene and supervise when necessary  - Involve family in performance of ADLs  - Assess for home care needs following discharge   - Consider OT consult to assist with ADL evaluation and planning for discharge  - Provide patient education as appropriate  Outcome: Progressing  Goal: Maintain proper alignment of affected body part  Description: INTERVENTIONS:  - Support, maintain and protect limb and body alignment  - Provide patient/ family with appropriate education  Outcome: Progressing     Problem: PAIN - ADULT  Goal: Verbalizes/displays adequate comfort level or baseline comfort level  Description: Interventions:  - Encourage patient to monitor pain and request  assistance  - Assess pain using appropriate pain scale  - Administer analgesics based on type and severity of pain and evaluate response  - Implement non-pharmacological measures as appropriate and evaluate response  - Consider cultural and social influences on pain and pain management  - Notify physician/advanced practitioner if interventions unsuccessful or patient reports new pain  Outcome: Progressing     Problem: INFECTION - ADULT  Goal: Absence or prevention of progression during hospitalization  Description: INTERVENTIONS:  - Assess and monitor for signs and symptoms of infection  - Monitor lab/diagnostic results  - Monitor all insertion sites, i.e. indwelling lines, tubes, and drains  - Monitor endotracheal if appropriate and nasal secretions for changes in amount and color  - Johnston appropriate cooling/warming therapies per order  - Administer medications as ordered  - Instruct and encourage patient and family to use good hand hygiene technique  - Identify and instruct in appropriate isolation precautions for identified infection/condition  Outcome: Progressing  Goal: Absence of fever/infection during neutropenic period  Description: INTERVENTIONS:  - Monitor WBC    Outcome: Progressing     Problem: SAFETY ADULT  Goal: Patient will remain free of falls  Description: INTERVENTIONS:  - Educate patient/family on patient safety including physical limitations  - Instruct patient to call for assistance with activity   - Consult OT/PT to assist with strengthening/mobility   - Keep Call bell within reach  - Keep bed low and locked with side rails adjusted as appropriate  - Keep care items and personal belongings within reach  - Initiate and maintain comfort rounds  - Make Fall Risk Sign visible to staff  - Offer Toileting every 2 Hours, in advance of need  - Initiate/Maintain alarm  - Obtain necessary fall risk management equipment:   - Apply yellow socks and bracelet for high fall risk patients  - Consider  moving patient to room near nurses station  Outcome: Progressing  Goal: Maintain or return to baseline ADL function  Description: INTERVENTIONS:  -  Assess patient's ability to carry out ADLs; assess patient's baseline for ADL function and identify physical deficits which impact ability to perform ADLs (bathing, care of mouth/teeth, toileting, grooming, dressing, etc.)  - Assess/evaluate cause of self-care deficits   - Assess range of motion  - Assess patient's mobility; develop plan if impaired  - Assess patient's need for assistive devices and provide as appropriate  - Encourage maximum independence but intervene and supervise when necessary  - Involve family in performance of ADLs  - Assess for home care needs following discharge   - Consider OT consult to assist with ADL evaluation and planning for discharge  - Provide patient education as appropriate  Outcome: Progressing  Goal: Maintains/Returns to pre admission functional level  Description: INTERVENTIONS:  - Perform AM-PAC 6 Click Basic Mobility/ Daily Activity assessment daily.  - Set and communicate daily mobility goal to care team and patient/family/caregiver.   - Collaborate with rehabilitation services on mobility goals if consulted  - Perform Range of Motion 4 times a day.  - Reposition patient every 2 hours.  - Dangle patient 3 times a day  - Stand patient 3 times a day  - Ambulate patient 3 times a day  - Out of bed to chair 3 times a day   - Out of bed for meals 3 times a day  - Out of bed for toileting  - Record patient progress and toleration of activity level   Outcome: Progressing     Problem: DISCHARGE PLANNING  Goal: Discharge to home or other facility with appropriate resources  Description: INTERVENTIONS:  - Identify barriers to discharge w/patient and caregiver  - Arrange for needed discharge resources and transportation as appropriate  - Identify discharge learning needs (meds, wound care, etc.)  - Arrange for interpretive services to  assist at discharge as needed  - Refer to Case Management Department for coordinating discharge planning if the patient needs post-hospital services based on physician/advanced practitioner order or complex needs related to functional status, cognitive ability, or social support system  Outcome: Progressing     Problem: Knowledge Deficit  Goal: Patient/family/caregiver demonstrates understanding of disease process, treatment plan, medications, and discharge instructions  Description: Complete learning assessment and assess knowledge base.  Interventions:  - Provide teaching at level of understanding  - Provide teaching via preferred learning methods  Outcome: Progressing     Problem: Alteration in Orientation  Goal: Treatment Goal: Demonstrate a reduction of confusion and improved orientation to person, place, time and/or situation upon discharge, according to optimum baseline/ability  Outcome: Progressing  Goal: Express concerns related to confused thinking related to:  Description: Interventions:  - Encourage patient to express feelings, fears, frustrations, hopes  - Assign consistent caregivers   - [place]/re-orient patient as needed  - Allow comfort items, as appropriate  - Provide visual cues, signs, etc.   Outcome: Progressing  Goal: Allow medical examinations, as recommended  Description: Interventions:  - Provide physical/neurological exams and/or referrals, per provider   Outcome: Progressing  Goal: Cooperate with recommended testing/procedures  Description: Interventions:  - Determine need for ancillary testing  - Observe for mental status changes  - Implement falls/precaution protocol   Outcome: Progressing  Goal: Attend and participate in unit activities, including therapeutic, recreational, and educational groups  Description: Interventions:  - Provide therapeutic and educational activities daily, encourage attendance and participation, and document same in the medical record   - Provide appropriate  opportunities for reminiscence   - Provide a consistent daily routine   - Encourage family contact/visitation   Outcome: Progressing  Goal: Complete daily ADLs, including personal hygiene independently, as able  Description: Interventions:  - Observe, teach, and assist patient with ADLS  Outcome: Progressing     Problem: Nutrition/Hydration-ADULT  Goal: Nutrient/Hydration intake appropriate for improving, restoring or maintaining nutritional needs  Description: Monitor and assess patient's nutrition/hydration status for malnutrition. Collaborate with interdisciplinary team and initiate plan and interventions as ordered.  Monitor patient's weight and dietary intake as ordered or per policy. Utilize nutrition screening tool and intervene as necessary. Determine patient's food preferences and provide high-protein, high-caloric foods as appropriate.     INTERVENTIONS:  - Monitor oral intake, urinary output, labs, and treatment plans  - Assess nutrition and hydration status and recommend course of action  - Evaluate amount of meals eaten  - Assist patient with eating if necessary   - Allow adequate time for meals  - Recommend/ encourage appropriate diets, oral nutritional supplements, and vitamin/mineral supplements  - Order, calculate, and assess calorie counts as needed  - Recommend, monitor, and adjust tube feedings and TPN/PPN based on assessed needs  - Assess need for intravenous fluids  - Provide specific nutrition/hydration education as appropriate  - Include patient/family/caregiver in decisions related to nutrition  Outcome: Progressing

## 2024-01-18 NOTE — ASSESSMENT & PLAN NOTE
Given a dose of IV Cefepime in the ED -> de-escalated to IV Ancef  Preliminary read from right lower extremity venous duplex study negative for DVT   1/17  improved rt leg swelling and redness , will dc abx

## 2024-01-18 NOTE — ASSESSMENT & PLAN NOTE
Patient has severe dementia with behavioral disturbance at baseline,  Geriatric input highly appreciated,  Delirium with hospitalization and acute infection with influenza A and cellulitis of the lower extremity which has improved,Will discontinue IV antibiotic with Ancef  However patient has decreased p.o. intake and confusion persist with needing restraints  Will continue with Tylenol scheduled dose  BuSpar has been discontinued and started on gabapentin  Maintain sleep-wake cycle  Avoid constipation and retention of urine  Will continue Lexapro and Zyprexa and nightly dose of melatonin

## 2024-01-18 NOTE — CASE MANAGEMENT
Case Management Progress Note    Patient name Tracy Rock  Location /-01 MRN [id number]  : 1940 Date 2024       LOS (days): 5  Geometric Mean LOS (GMLOS) (days): 3.6  Days to GMLOS:-1.1        OBJECTIVE:        Current admission status: Inpatient  Preferred Pharmacy:   [id number] [place], [place] - [address] [address]  Phone: [phone] Fax: [phone]    [facility] [address] [facility]  [address]  [address]  [facility] [address]  Phone: [phone] Fax: [phone]    Primary Care Provider: Bertha Isaac DO    Primary Insurance: MEDICARE  Secondary Insurance: [facility]    PROGRESS NOTE:    Cm contacted [facility] Personal Care-Dementia unit to discuss patient's baseline functioning. Cm spoke with Za at facility. Per Za, when patient is having a bad day, usually 2-3 times a week, patient is totally dependent. She will not wash/bath/walk/ or respond to questions. However, when patient is more alert, she is able to vocalize being frustrated and needs. She would be able to wash her face and under her arms. She is reportedly able to walk over 200ft. Cm being advised that patient will need to walk over 200ft prior to coming back. Cm advised that patient will need to go to SNF. Cm will place referral with the requirements from the Personal Care to SNF.

## 2024-01-18 NOTE — ASSESSMENT & PLAN NOTE
Lab Results   Component Value Date    HGBA1C 7.6 (H) 01/17/2024     Diet controlled at home  Continue SSI coverage per [employer] while hospitalized  Carbohydrate restriction  Hypoglycemia protocol
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
## 2024-01-18 NOTE — PROGRESS NOTES
Formerly [facility], later [facility]  Progress Note  Name: Tracy Rock I  MRN: [id number]  Unit/Bed#: -01 I Date of Admission: 1/13/2024   Date of Service: 1/17/2024 I Hospital Day: 4    Assessment/Plan   Polycythemia  Assessment & Plan  Elevated hb likely with poor po intake and dehydration will give IV fluids , also has CHF , hold lasix and avoid fluid overload       Acute encephalopathy  Assessment & Plan  Patient has severe dementia with behavioral disturbance at baseline,  Geriatric input highly appreciated,  Delirium with hospitalization and acute infection with influenza A and cellulitis of the lower extremity which has improved,Will discontinue IV antibiotic with Ancef  However patient has decreased p.o. intake and confusion persist with needing restraints  Will continue with Tylenol scheduled dose  BuSpar has been discontinued and started on gabapentin  Maintain sleep-wake cycle  Avoid constipation and retention of urine  Will continue Lexapro and Zyprexa and nightly dose of melatonin     Chronic systolic heart failure (HCC)  Assessment & Plan  Last EF of 20-25% in October 2021  On diuresis with Lasix and beta-blockade with Toprol-XL  Low-sodium diet with fluid restriction enforced  Monitor/replete serum potassium/magnesium as necessary  Will hold lasix due to dehydration and decreased PO intake     History of breast cancer  Assessment & Plan  Status post left mastectomy  Outpatient follow-up    Hyperlipidemia  Assessment & Plan  Continue statin    Hypomagnesemia  Assessment & Plan  Monitor/replete serum magnesium and potassium    Cellulitis of right lower extremity  Assessment & Plan  Given a dose of IV Cefepime in the ED -> de-escalated to IV Ancef  Preliminary read from right lower extremity venous duplex study negative for DVT   1/17  improved rt leg swelling and redness , will dc abx     Dementia (HCC)  Assessment & Plan  Supportive care  Continue Buspar/Lexapro/Zyprexa and PRN Ativan  Has  required intermittent course of restraints    Atrial fibrillation (HCC)  Assessment & Plan  Cont eliquis and toprol XL     GERD (gastroesophageal reflux disease)  Assessment & Plan  Continue Pepcid    Type 2 diabetes mellitus (HCC)  Assessment & Plan  Lab Results   Component Value Date    HGBA1C 7.6 (H) 01/17/2024     Diet controlled at home  Continue SSI coverage per [employer] while hospitalized  Carbohydrate restriction  Hypoglycemia protocol    Essential hypertension  Assessment & Plan  Low sodium restriction  Continue Zestril/Toprol-XL - additional PRN IV Hydralazine on board    Atrial fibrillation (HCC)  Assessment & Plan  Chronic rate controlled with Toprol-XL  On Eliquis for anticoagulation  [id number] score of 8               VTE Pharmacologic Prophylaxis:   Moderate Risk (Score 3-4) - Pharmacological DVT Prophylaxis Ordered: apixaban (Eliquis).    Mobility:   Basic Mobility Inpatient Raw Score: 6  JH-HLM Goal: 2: Bed activities/Dependent transfer  JH-HLM Achieved: 2: Bed activities/Dependent transfer  HLM Goal achieved. Continue to encourage appropriate mobility.    Patient Centered Rounds: I performed bedside rounds with nursing staff today.   Discussions with Specialists or Other Care Team Provider: d/w geriatrics     Education and Discussions with Family / Patient: Updated  (son) via phone.    Total Time Spent on Date of Encounter in care of patient: 45 mins. This time was spent on one or more of the following: performing physical exam; counseling and coordination of care; obtaining or reviewing history; documenting in the medical record; reviewing/ordering tests, medications or procedures; communicating with other healthcare professionals and discussing with patient's family/caregivers.    Current Length of Stay: 4 day(s)  Current Patient Status: Inpatient   Certification Statement: The patient will continue to require additional inpatient hospital stay due to delirium   Discharge Plan:  Anticipate discharge in 48 hrs to rehab facility.    Code Status: Level 1 - Full Code    Subjective:   Pt is confused , needing restraints, pt has poor po intake ,     Objective:     Vitals:   Temp (24hrs), Av.3 °F (36.8 °C), Min:97.8 °F (36.6 °C), Max:98.6 °F (37 °C)    Temp:  [97.8 °F (36.6 °C)-98.6 °F (37 °C)] 98.6 °F (37 °C)  HR:  [54-92] 54  Resp:  [19-20] 20  BP: (117-144)/(47-84) 133/47  SpO2:  [93 %-96 %] 93 %  Body mass index is 31.77 kg/m².     Input and Output Summary (last 24 hours):     Intake/Output Summary (Last 24 hours) at 2024  Last data filed at 2024 0611  Gross per 24 hour   Intake 50 ml   Output 300 ml   Net -250 ml       Physical Exam:   Physical Exam   HEENT-PERRLA,dry  oral mucosa  Neck-supple, no JVD elevation   Respiratory-decreased air entry at bases   Cardiovascular system-S1, S2 heard, no murmur or gallops or rubs  Abdomen-soft, nontender, no guarding or rigidity, bowel sounds heard  Extremities-no pedal edema  Peripheral pulses palpable  Musculoskeletal-no contractures  Central nervous system-no acute focal neurological deficit ,no sensory or motor deficit noted.  Skin-no rash noted       Additional Data:     Labs:  Results from last 7 days   Lab Units 24  0501   WBC Thousand/uL 7.05   HEMOGLOBIN g/dL 18.0*   HEMATOCRIT % 55.2*   PLATELETS Thousands/uL 190   NEUTROS PCT % 60   LYMPHS PCT % 27   MONOS PCT % 12   EOS PCT % 1     Results from last 7 days   Lab Units 24  0649   SODIUM mmol/L 138   POTASSIUM mmol/L 3.8   CHLORIDE mmol/L 101   CO2 mmol/L 27   BUN mg/dL 15   CREATININE mg/dL 0.64   ANION GAP mmol/L 10   CALCIUM mg/dL 9.8   ALBUMIN g/dL 3.1*   TOTAL BILIRUBIN mg/dL 0.82   ALK PHOS U/L 68   ALT U/L 13   AST U/L 47*   GLUCOSE RANDOM mg/dL 140         Results from last 7 days   Lab Units 24  1632 24  1120 24  0724 24  2051 24  1627 24  1148 24  0706 01/15/24  2151 01/15/24  1618 01/15/24  1124 01/15/24  0709  01/14/24 2034   POC GLUCOSE mg/dl 145* 135 127 114 131 122 116 89 158* 137 80 103     Results from last 7 days   Lab Units 01/17/24  0501   HEMOGLOBIN A1C % 7.6*     Results from last 7 days   Lab Units 01/14/24  0458 01/13/24  1417   LACTIC ACID mmol/L  --  1.4   PROCALCITONIN ng/ml 0.06 0.07       Lines/Drains:  Invasive Devices       Peripheral Intravenous Line  Duration             Peripheral IV 01/13/24 Distal;Dorsal (posterior);Right Forearm 3 days                          Imaging: Personally reviewed the following imaging: chest xray    Recent Cultures (last 7 days):   Results from last 7 days   Lab Units 01/13/24  1600 01/13/24  1417   BLOOD CULTURE   --  No Growth at 72 hrs.  No Growth at 72 hrs.   URINE CULTURE  No Growth <1000 cfu/mL  --        Last 24 Hours Medication List:   Current Facility-Administered Medications   Medication Dose Route Frequency Provider Last Rate    acetaminophen  650 mg Oral Q6H PRN Charley Carrasco MD      acetaminophen  975 mg Oral Q8H Devorah Salas MD      apixaban  5 mg Oral BID Charley Carrasco MD      atorvastatin  10 mg Oral Daily With Dinner Charley Carrasco MD      cholecalciferol  2,000 Units Oral Daily Charley Carrasco MD      escitalopram  10 mg Oral Daily Charley Carrasco MD      famotidine  20 mg Oral Daily Charley Carrasco MD      gabapentin  100 mg Oral BID Sarah Cunningham MD      guaiFENesin  200 mg Oral Q4H PRN Sarah Cunningham MD      hydrALAZINE  5 mg Intravenous Q6H PRN Charley Carrasco MD      insulin lispro  1-6 Units Subcutaneous 4x Daily (AC & HS) Charley Carrasco MD      lactated ringers  100 mL/hr Intravenous Continuous Devorah Salas  mL/hr (01/17/24 1052)    lisinopril  10 mg Oral Daily Charley Carrasco MD      LORazepam  0.5 mg Oral Q8H PRN Charley Carrasco MD      melatonin  3 mg Oral HS Sarah Cunningham MD      metoprolol succinate  50 mg Oral Daily Charley Carrasco MD      OLANZapine  2.5 mg Oral Daily Charley Carrasco MD      oseltamivir  75 mg Oral Q12H  [facility] Charley Carrasco MD      oxyCODONE  2.5 mg Oral Q6H PRN Charley Carrasco MD      oxyCODONE  5 mg Oral Q6H PRN Charley Carrasco MD      trimethobenzamide  200 mg Intramuscular Q6H PRN Charley Carrasco MD          Today, Patient Was Seen By: Devorah Salas MD    **Please Note: This note may have been constructed using a voice recognition system.**

## 2024-01-18 NOTE — ASSESSMENT & PLAN NOTE
Elevated hb likely with poor po intake and dehydration will give IV fluids , also has CHF , hold lasix and avoid fluid overload

## 2024-01-18 NOTE — ASSESSMENT & PLAN NOTE
Given a dose of IV Cefepime in the ED -> de-escalated to IV Ancef  Cefazolin discontinued   Preliminary read from right lower extremity venous duplex study negative for DVT   1/17  improved rt leg swelling and redness , will dc abx

## 2024-01-18 NOTE — PLAN OF CARE
Pt presented as poorly responsive to begin.  However, after ample stimulation, she roused and excepted servings of purée, nectar thick and thin liquid.  She presented with s/s suggestive of moderate oral and suspected mild pharyngeal dysphagia.  Risk for poor nutrition appear significant at this time, at least mild aspiration risk also present.    Recommended Diet: puree/level 1 diet and nectar thick liquids   Recommended Form of Meds: crushed with puree   Aspiration precautions and swallowing strategies: upright posture, only feed when fully alert, slow rate of feeding, and please use straws for administration of liquids.  Other Recommendations: Continue frequent oral care

## 2024-01-18 NOTE — CASE MANAGEMENT
Case Management Assessment & Discharge Planning Note    Patient name Tracy Rock  Location /-01 MRN [id number]  : 1940 Date 2024       Current Admission Date: 2024  Current Admission Diagnosis:Sepsis ([facility])   Patient Active Problem List    Diagnosis Date Noted    Acute encephalopathy 2024    Polycythemia 2024    Elevated hemoglobin ([facility]) 2024    Sepsis ([facility]) 2024    Influenza A 2024    Cellulitis of right lower extremity 2024    Hypomagnesemia 2024    Hyperlipidemia 2024    History of breast cancer 2024    Chronic systolic heart failure ([facility]) 2024    Generalized weakness 2024    Dysuria 2023    Ambulatory dysfunction 2023    Cellulitis of left lower extremity 2023    Venous stasis ulcer of left calf ([facility]) 2023    Right hand pain 2022    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    Renal calculus 2022    Acute pain of left knee 2022    Acute pain due to trauma 09/15/2022    History of hypertension 09/15/2022    Paroxysmal atrial fibrillation ([facility]) 09/15/2022    Closed compression fracture of L2 lumbar vertebra, initial encounter ([facility]) 2022    Fall from ground level 2022    Fall 2022    Low back pain 2022    Dementia ([facility]) 2022    Recurrent falls 2022    PONV (postoperative nausea and vomiting)     Obesity, morbid ([facility]) 2021    Primary osteoarthritis of left knee 2019    Deep vein thrombosis (DVT) of lower extremity ([facility]) 2017    PE (pulmonary thromboembolism) ([facility]) 2017    DVT (deep venous thrombosis) ([facility]) 2017    Type 2 diabetes mellitus ([facility]) 2017    Incomplete RBBB 2017    Pulmonary emboli ([facility]) 2017    Chronic systolic congestive heart failure ([facility]) 2016    Atrial fibrillation ([facility]) 2016    Essential hypertension 2016    NICM (nonischemic cardiomyopathy) ([facility])  12/02/2016    Nonischemic cardiomyopathy (HCC) 06/23/2016    Atrial fibrillation (HCC) 02/25/2016    GERD (gastroesophageal reflux disease) 09/05/2012    DDD (degenerative disc disease), lumbosacral 11/19/2007    Thoracic or lumbosacral neuritis or radiculitis 11/19/2007      LOS (days): 5  Geometric Mean LOS (GMLOS) (days): 3.6  Days to GMLOS:-1.3     OBJECTIVE:    Risk of Unplanned Readmission Score: 19.12         Current admission status: Inpatient       Preferred Pharmacy:   [id number] - [place], [place] - [address]  [address] [address]  Phone: [phone] Fax: [phone]    [facility] - [place], [place] - [address]  [address]  [address]  [place] [address]  Phone: [phone] Fax: [phone]    Primary Care Provider: Bertha Isaac DO    Primary Insurance: MEDICARE  Secondary Insurance: [facility]    ASSESSMENT:  Active Health Care Proxies    There are no active Health Care Proxies on file.                 Readmission Root Cause  30 Day Readmission: No    Patient Information  Admitted from:: Facility ([facility])  Mental Status: Confused  During Assessment patient was accompanied by: Not accompanied during assessment  Assessment information provided by:: Son, Other - please comment [facility]  Support Systems: Son  Home entry access options. Select all that apply.: No steps to enter home  Type of Current Residence: Facility  Upon entering residence, is there a bedroom on the main floor (no further steps)?: Yes  Upon entering residence, is there a bathroom on the main floor (no further steps)?: Yes  Living Arrangements: Other (Comment)    Activities of Daily Living Prior to Admission  Functional Status: Assistance (Staff at [facility]-sometimes dependent)  Completes ADLs independently?: No  Level of ADL dependence:  "Assistance (depending on dementia, can sometimes be total dependent.)  Ambulates independently?: No  Level of ambulatory dependence: Assistance (depending on dementia, can walk independently with RW or is dependent)  Does patient use assisted devices?: Yes  Assisted Devices (DME) used: Walker  Does patient currently own DME?: No  Does patient have a history of Outpatient Therapy (PT/OT)?: No  Does the patient have a history of Short-Term Rehab?: Yes  Does patient have a history of HHC?: No  Does patient currently have HHC?: No         Patient Information Continued  Income Source: Pension/penitentiary  Does patient have prescription coverage?: Yes  Does patient receive dialysis treatments?: No  Does patient have a history of substance abuse?: No  Does patient have a history of Mental Health Diagnosis?: No         Means of Transportation  Means of Transport to Appts:: Family transport      Housing Stability: Low Risk  (3/31/2022)    Housing Stability Vital Sign     Unable to Pay for Housing in the Last Year: No     Number of Places Lived in the Last Year: 1     Unstable Housing in the Last Year: No   Food Insecurity: No Food Insecurity (3/31/2022)    Hunger Vital Sign     Worried About Running Out of Food in the Last Year: Never true     Ran Out of Food in the Last Year: Never true   Transportation Needs: Not on file   Utilities: Not on file       DISCHARGE DETAILS:    Discharge planning discussed with:: Za-Representative from [facility] [place] and son  Freedom of Choice: Yes  Comments - Freedom of Choice: Per discussion with son, he would like for patient to return to [facility] personal care dementia unit due to them providing more care for patient. However, he understands that if patient must go through SNF then this is also okay. Augustus spoke with Za today from [facility] dementia unit that expressed that when patient is in the \"zone\" and is very confused, then she is dependent for all care and will not " respond. When she is not too confused, she is able to ambulate and communicat some with staff. Per Za, pedro luis is reportedly able to ambulate over 200 ft. Cm informed providers and therapy is pending. Per Za and supervisor, patient will need to be admitted to SNF prior to return.  CM contacted family/caregiver?: Yes  Were Treatment Team discharge recommendations reviewed with patient/caregiver?: Yes  Did patient/caregiver verbalize understanding of patient care needs?: N/A- going to facility  Were patient/caregiver advised of the risks associated with not following Treatment Team discharge recommendations?: Yes    Contacts  Patient Contacts: Ghassan -son  Relationship to Patient:: Family  Contact Method: Phone  Phone Number: [phone]  Reason/Outcome: Discharge Planning    Requested Home Health Care         Is the patient interested in HHC at discharge?: No    DME Referral Provided  Referral made for DME?: No    Other Referral/Resources/Interventions Provided:  Referral Comments: awaiting appropriateness for referral to [facility]         Treatment Team Recommendation: Short Term Rehab  Discharge Destination Plan:: Short Term Rehab

## 2024-01-18 NOTE — PROGRESS NOTES
[facility]  Progress Note  Name: Tracy Rock I  MRN: [id number]  Unit/Bed#: -01 I Date of Admission: 1/13/2024   Date of Service: 1/18/2024 I Hospital Day: 5    Assessment/Plan   Polycythemia  Assessment & Plan  Elevated hb likely with poor po intake and dehydration will give IV fluids , also has CHF , hold lasix and avoid fluid overload       Encephalopathy  Assessment & Plan  Patient has severe dementia with behavioral disturbance at baseline,  Geriatric input highly appreciated,  Delirium with hospitalization and acute infection with influenza A and cellulitis of the lower extremity which has improved,Will discontinue IV antibiotic with Ancef  However patient has decreased p.o. intake and confusion persist with needing restraints  Will continue with Tylenol scheduled dose  BuSpar has been discontinued and started on gabapentin  Maintain sleep-wake cycle  Avoid constipation and retention of urine  Will continue Lexapro and Zyprexa and nightly dose of melatonin   Will get CT brain , stat , r/o intracranial pathology     Chronic systolic heart failure (HCC)  Assessment & Plan  Last EF of 20-25% in October 2021  On diuresis with Lasix and beta-blockade with Toprol-XL  Low-sodium diet with fluid restriction enforced  Monitor/replete serum potassium/magnesium as necessary  Will hold lasix due to dehydration and decreased PO intake     History of breast cancer  Assessment & Plan  Status post left mastectomy  Outpatient follow-up    Hyperlipidemia  Assessment & Plan  Continue statin    Hypomagnesemia  Assessment & Plan  Monitor/replete serum magnesium and potassium    Cellulitis of right lower extremity  Assessment & Plan  Given a dose of IV Cefepime in the ED -> de-escalated to IV Ancef  Cefazolin discontinued   Preliminary read from right lower extremity venous duplex study negative for DVT   1/17  improved rt leg swelling and redness , will dc abx     Influenza A  Assessment &  Plan  Contact/droplet precautions  Completed tamiflu today  Supportive care  Maintain oxygenation    Ambulatory dysfunction  Assessment & Plan  Cont PT/OT when more awake     Dementia (HCC)  Assessment & Plan  Supportive care  Continue Lexapro/Zyprexa and PRN Ativan  Discontinue buspar  Has required intermittent course of restraints ,now will take off restraints and see     Atrial fibrillation ([facility])  Assessment & Plan  Cont eliquis and toprol XL     GERD (gastroesophageal reflux disease)  Assessment & Plan  Continue Pepcid    Type 2 diabetes mellitus ([facility])  Assessment & Plan  Lab Results   Component Value Date    HGBA1C 7.6 (H) 01/17/2024     Diet controlled at home  Continue SSI coverage per Accu-Cheks while hospitalized  Carbohydrate restriction  Hypoglycemia protocol  Diet is downgraded - pureed and nectar thick liquid     Essential hypertension  Assessment & Plan  Low sodium restriction  Continue Zestril/Toprol-XL - prn IV hydralazine     Atrial fibrillation ([facility])  Assessment & Plan  Chronic rate controlled with Toprol-XL  Cont eliquis   [id number] score of 8    * Sepsis ([facility])  Assessment & Plan  Presents with tachycardia/tachypnea and nursing home reports of recurrent fevers over the last few days  Etiology suspected to be secondary to a small developing right lower extremity lightheadedness coupled with influenza A (plan for individual assessments)  Monitor vitals and maintain hemodynamics - keep MAP > 65 -> currently at goal, and in the setting of chronic systolic CHF, IV fluid resuscitation not indicated  Blood cultures from 1/13 remain negative thus far - urinalysis negative for infection - CXR, on personal review, without focal infiltrates but mild bilateral reticular-type opacities vs vascular congestion (known CHF history)  Lactic acid normal - procalcitonin negative x 2                VTE Pharmacologic Prophylaxis:   Moderate Risk (Score 3-4) - Pharmacological DVT Prophylaxis Ordered: apixaban  (Eliquis).    Mobility:   Basic Mobility Inpatient Raw Score: 6  JH-HLM Goal: 2: Bed activities/Dependent transfer  JH-HLM Achieved: 2: Bed activities/Dependent transfer  HLM Goal achieved. Continue to encourage appropriate mobility.    Patient Centered Rounds: I performed bedside rounds with nursing staff today.   Discussions with Specialists or Other Care Team Provider: staff ,      Education and Discussions with Family / Patient: Updated  (son) via phone.    Total Time Spent on Date of Encounter in care of patient: 45 mins. This time was spent on one or more of the following: performing physical exam; counseling and coordination of care; obtaining or reviewing history; documenting in the medical record; reviewing/ordering tests, medications or procedures; communicating with other healthcare professionals and discussing with patient's family/caregivers.    Current Length of Stay: 5 day(s)  Current Patient Status: Inpatient   Certification Statement: The patient will continue to require additional inpatient hospital stay due to encephalopathy , influenza A   Discharge Plan: Anticipate discharge in >72 hrs to discharge location to be determined pending rehab evaluations.    Code Status: Level 1 - Full Code    Subjective:   Pt is still confused , lethargic ,unable to respond , opens eyes,has no fever or chills      Objective:     Vitals:   Temp (24hrs), Av.6 °F (35.9 °C), Min:93.6 °F (34.2 °C), Max:98.6 °F (37 °C)    Temp:  [93.6 °F (34.2 °C)-98.6 °F (37 °C)] 93.6 °F (34.2 °C)  HR:  [67-92] 89  Resp:  [17-24] 24  BP: (133-150)/(47-95) 137/76  SpO2:  [84 %-94 %] 84 %  Body mass index is 31.77 kg/m².     Input and Output Summary (last 24 hours):     Intake/Output Summary (Last 24 hours) at 2024 1522  Last data filed at 2024 1100  Gross per 24 hour   Intake 1860 ml   Output --   Net 1860 ml       Physical Exam:   Physical Exam   HEENT-PERRLA, dry  oral mucosa  Neck-supple, no JVD  elevation   Respiratory-decreased air entry b/l   Cardiovascular system-S1, S2 heard, no murmur or gallops or rubs  Abdomen-soft, nontender, no guarding or rigidity, bowel sounds heard  Extremities-no pedal edema  Peripheral pulses palpable  Musculoskeletal-no contractures  Central nervous system-confused , lethargic,no focal deficits    Skin-no rash noted       Additional Data:     Labs:  Results from last 7 days   Lab Units 01/18/24  0546   WBC Thousand/uL 7.97  7.61   HEMOGLOBIN g/dL 17.1*  16.9*   HEMATOCRIT % 51.0*  50.5*   PLATELETS Thousands/uL 199  193   NEUTROS PCT % 53   LYMPHS PCT % 30   MONOS PCT % 16*   EOS PCT % 1     Results from last 7 days   Lab Units 01/18/24  0546   SODIUM mmol/L 141   POTASSIUM mmol/L 3.9   CHLORIDE mmol/L 104   CO2 mmol/L 27   BUN mg/dL 18   CREATININE mg/dL 0.68   ANION GAP mmol/L 10   CALCIUM mg/dL 10.0   ALBUMIN g/dL 3.1*   TOTAL BILIRUBIN mg/dL 1.03*   ALK PHOS U/L 66   ALT U/L 7   AST U/L 37   GLUCOSE RANDOM mg/dL 126         Results from last 7 days   Lab Units 01/18/24  1102 01/18/24  0712 01/17/24  2042 01/17/24  1632 01/17/24  1120 01/17/24  0724 01/16/24  2051 01/16/24  1627 01/16/24  1148 01/16/24  0706 01/15/24  2151 01/15/24  1618   POC GLUCOSE mg/dl 133 101 108 145* 135 127 114 131 122 116 89 158*     Results from last 7 days   Lab Units 01/17/24  0501   HEMOGLOBIN A1C % 7.6*     Results from last 7 days   Lab Units 01/14/24  0458 01/13/24  1417   LACTIC ACID mmol/L  --  1.4   PROCALCITONIN ng/ml 0.06 0.07       Lines/Drains:  Invasive Devices       Peripheral Intravenous Line  Duration             Peripheral IV 01/18/24 Right;Ventral (anterior) Forearm <1 day                          Imaging: Personally reviewed the following imaging: CT head    Recent Cultures (last 7 days):   Results from last 7 days   Lab Units 01/13/24  1600 01/13/24  1417   BLOOD CULTURE   --  No Growth After 4 Days.  No Growth After 4 Days.   URINE CULTURE  No Growth <1000 cfu/mL  --         Last 24 Hours Medication List:   Current Facility-Administered Medications   Medication Dose Route Frequency Provider Last Rate    acetaminophen  650 mg Oral Q6H PRN Charley Carrasco MD      acetaminophen  975 mg Oral Q8H Devorah Salas MD      apixaban  5 mg Oral BID Charley Carrasco MD      atorvastatin  10 mg Oral Daily With Dinner Charley Carrasco MD      cholecalciferol  2,000 Units Oral Daily Charley Carrasco MD      escitalopram  10 mg Oral Daily Charley Carrasco MD      famotidine  20 mg Oral Daily Charley Carrasco MD      gabapentin  100 mg Oral BID Sarah Cunningham MD      guaiFENesin  200 mg Oral Q4H PRN Sarah Cunningham MD      hydrALAZINE  5 mg Intravenous Q6H PRN Charley Carrasco MD      insulin lispro  1-6 Units Subcutaneous 4x Daily (AC & HS) Charley Carrasco MD      lactated ringers  100 mL/hr Intravenous Continuous Devorah Salas  mL/hr (01/18/24 0605)    lisinopril  10 mg Oral Daily Charley Carrasco MD      LORazepam  0.5 mg Oral Q8H PRN Charley Carrasco MD      melatonin  3 mg Oral HS Sarah Cunningham MD      metoprolol succinate  50 mg Oral Daily Charley Carrasco MD      OLANZapine  2.5 mg Oral Daily Charley Carrasco MD      oseltamivir  75 mg Oral Q12H CJ Charley Carrasco MD      oxyCODONE  2.5 mg Oral Q6H PRN Charley Carrasco MD      oxyCODONE  5 mg Oral Q6H PRN Charley Carrasco MD      trimethobenzamide  200 mg Intramuscular Q6H PRN Charley Carrasco MD          Today, Patient Was Seen By: Devorah Salas MD    **Please Note: This note may have been constructed using a voice recognition system.**

## 2024-01-18 NOTE — ASSESSMENT & PLAN NOTE
Supportive care  Continue Lexapro/Zyprexa and PRN Ativan  Discontinue buspar  Has required intermittent course of restraints ,now will take off restraints and see

## 2024-01-18 NOTE — ASSESSMENT & PLAN NOTE
Patient has severe dementia with behavioral disturbance at baseline,  Geriatric input highly appreciated,  Delirium with hospitalization and acute infection with influenza A and cellulitis of the lower extremity which has improved,Will discontinue IV antibiotic with Ancef  However patient has decreased p.o. intake and confusion persist with needing restraints  Will continue with Tylenol scheduled dose  BuSpar has been discontinued and started on gabapentin  Maintain sleep-wake cycle  Avoid constipation and retention of urine  Will continue Lexapro and Zyprexa and nightly dose of melatonin   Will get CT brain , stat , r/o intracranial pathology

## 2024-01-18 NOTE — PLAN OF CARE
Problem: Prexisting or High Potential for Compromised Skin Integrity  Goal: Skin integrity is maintained or improved  Description: INTERVENTIONS:  - Identify patients at risk for skin breakdown  - Assess and monitor skin integrity  - Assess and monitor nutrition and hydration status  - Monitor labs   - Assess for incontinence   - Turn and reposition patient  - Assist with mobility/ambulation  - Relieve pressure over bony prominences  - Avoid friction and shearing  - Provide appropriate hygiene as needed including keeping skin clean and dry  - Evaluate need for skin moisturizer/barrier cream  - Collaborate with interdisciplinary team   - Patient/family teaching  - Consider wound care consult   Outcome: Progressing     Problem: NEUROSENSORY - ADULT  Goal: Achieves stable or improved neurological status  Description: INTERVENTIONS  - Monitor and report changes in neurological status  - Monitor vital signs such as temperature, blood pressure, glucose, and any other labs ordered   - Initiate measures to prevent increased intracranial pressure  - Monitor for seizure activity and implement precautions if appropriate      Outcome: Progressing  Goal: Achieves maximal functionality and self care  Description: INTERVENTIONS  - Monitor swallowing and airway patency with patient fatigue and changes in neurological status  - Encourage and assist patient to increase activity and self care.   - Encourage visually impaired, hearing impaired and aphasic patients to use assistive/communication devices  Outcome: Progressing     Problem: CARDIOVASCULAR - ADULT  Goal: Maintains optimal cardiac output and hemodynamic stability  Description: INTERVENTIONS:  - Monitor I/O, vital signs and rhythm  - Monitor for S/S and trends of decreased cardiac output  - Administer and titrate ordered vasoactive medications to optimize hemodynamic stability  - Assess quality of pulses, skin color and temperature  - Assess for signs of decreased coronary  artery perfusion  - Instruct patient to report change in severity of symptoms  Outcome: Progressing  Goal: Absence of cardiac dysrhythmias or at baseline rhythm  Description: INTERVENTIONS:  - Continuous cardiac monitoring, vital signs, obtain 12 lead EKG if ordered  - Administer antiarrhythmic and heart rate control medications as ordered  - Monitor electrolytes and administer replacement therapy as ordered  Outcome: Progressing     Problem: RESPIRATORY - ADULT  Goal: Achieves optimal ventilation and oxygenation  Description: INTERVENTIONS:  - Assess for changes in respiratory status  - Assess for changes in mentation and behavior  - Position to facilitate oxygenation and minimize respiratory effort  - Oxygen administered by appropriate delivery if ordered  - Initiate smoking cessation education as indicated  - Encourage broncho-pulmonary hygiene including cough, deep breathe, Incentive Spirometry  - Assess the need for suctioning and aspirate as needed  - Assess and instruct to report SOB or any respiratory difficulty  - Respiratory Therapy support as indicated  Outcome: Progressing     Problem: GENITOURINARY - ADULT  Goal: Maintains or returns to baseline urinary function  Description: INTERVENTIONS:  - Assess urinary function  - Encourage oral fluids to ensure adequate hydration if ordered  - Administer IV fluids as ordered to ensure adequate hydration  - Administer ordered medications as needed  - Offer frequent toileting  - Follow urinary retention protocol if ordered  Outcome: Progressing  Goal: Absence of urinary retention  Description: INTERVENTIONS:  - Assess patient’s ability to void and empty bladder  - Monitor I/O  - Bladder scan as needed  - Discuss with physician/AP medications to alleviate retention as needed  - Discuss catheterization for long term situations as appropriate  Outcome: Progressing     Problem: MUSCULOSKELETAL - ADULT  Goal: Maintain or return mobility to safest level of  function  Description: INTERVENTIONS:  - Assess patient's ability to carry out ADLs; assess patient's baseline for ADL function and identify physical deficits which impact ability to perform ADLs (bathing, care of mouth/teeth, toileting, grooming, dressing, etc.)  - Assess/evaluate cause of self-care deficits   - Assess range of motion  - Assess patient's mobility  - Assess patient's need for assistive devices and provide as appropriate  - Encourage maximum independence but intervene and supervise when necessary  - Involve family in performance of ADLs  - Assess for home care needs following discharge   - Consider OT consult to assist with ADL evaluation and planning for discharge  - Provide patient education as appropriate  Outcome: Progressing  Goal: Maintain proper alignment of affected body part  Description: INTERVENTIONS:  - Support, maintain and protect limb and body alignment  - Provide patient/ family with appropriate education  Outcome: Progressing     Problem: DISCHARGE PLANNING  Goal: Discharge to home or other facility with appropriate resources  Description: INTERVENTIONS:  - Identify barriers to discharge w/patient and caregiver  - Arrange for needed discharge resources and transportation as appropriate  - Identify discharge learning needs (meds, wound care, etc.)  - Arrange for interpretive services to assist at discharge as needed  - Refer to Case Management Department for coordinating discharge planning if the patient needs post-hospital services based on physician/advanced practitioner order or complex needs related to functional status, cognitive ability, or social support system  Outcome: Progressing     Problem: Knowledge Deficit  Goal: Patient/family/caregiver demonstrates understanding of disease process, treatment plan, medications, and discharge instructions  Description: Complete learning assessment and assess knowledge base.  Interventions:  - Provide teaching at level of understanding  -  Provide teaching via preferred learning methods  Outcome: Progressing     Problem: Alteration in Orientation  Goal: Treatment Goal: Demonstrate a reduction of confusion and improved orientation to person, place, time and/or situation upon discharge, according to optimum baseline/ability  Outcome: Progressing  Goal: Express concerns related to confused thinking related to:  Description: Interventions:  - Encourage patient to express feelings, fears, frustrations, hopes  - Assign consistent caregivers   - [place]/re-orient patient as needed  - Allow comfort items, as appropriate  - Provide visual cues, signs, etc.   Outcome: Progressing  Goal: Allow medical examinations, as recommended  Description: Interventions:  - Provide physical/neurological exams and/or referrals, per provider   Outcome: Progressing  Goal: Cooperate with recommended testing/procedures  Description: Interventions:  - Determine need for ancillary testing  - Observe for mental status changes  - Implement falls/precaution protocol   Outcome: Progressing  Goal: Attend and participate in unit activities, including therapeutic, recreational, and educational groups  Description: Interventions:  - Provide therapeutic and educational activities daily, encourage attendance and participation, and document same in the medical record   - Provide appropriate opportunities for reminiscence   - Provide a consistent daily routine   - Encourage family contact/visitation   Outcome: Progressing  Goal: Complete daily ADLs, including personal hygiene independently, as able  Description: Interventions:  - Observe, teach, and assist patient with ADLS  Outcome: Progressing

## 2024-01-18 NOTE — ASSESSMENT & PLAN NOTE
Last EF of 20-25% in October 2021  On diuresis with Lasix and beta-blockade with Toprol-XL  Low-sodium diet with fluid restriction enforced  Monitor/replete serum potassium/magnesium as necessary  Will hold lasix due to dehydration and decreased PO intake

## 2024-01-18 NOTE — ASSESSMENT & PLAN NOTE
Lab Results   Component Value Date    HGBA1C 7.6 (H) 01/17/2024     Diet controlled at home  Continue SSI coverage per Accu-Cheks while hospitalized  Carbohydrate restriction  Hypoglycemia protocol  Diet is downgraded - pureed and nectar thick liquid

## 2024-01-19 PROBLEM — Z71.89 GOALS OF CARE, COUNSELING/DISCUSSION: Status: ACTIVE | Noted: 2024-01-01

## 2024-01-19 LAB
ALBUMIN SERPL BCP-MCNC: 2.8 G/DL (ref 3.5–5)
ALP SERPL-CCNC: 65 U/L (ref 34–104)
ALT SERPL W P-5'-P-CCNC: 8 U/L (ref 7–52)
ANION GAP SERPL CALCULATED.3IONS-SCNC: 5 MMOL/L
AST SERPL W P-5'-P-CCNC: 31 U/L (ref 13–39)
BASOPHILS # BLD AUTO: 0.02 THOUSANDS/ÂΜL (ref 0–0.1)
BASOPHILS NFR BLD AUTO: 0 % (ref 0–1)
BILIRUB SERPL-MCNC: 1.32 MG/DL (ref 0.2–1)
BUN SERPL-MCNC: 13 MG/DL (ref 5–25)
CALCIUM ALBUM COR SERPL-MCNC: 10.6 MG/DL (ref 8.3–10.1)
CALCIUM SERPL-MCNC: 9.6 MG/DL (ref 8.4–10.2)
CHLORIDE SERPL-SCNC: 106 MMOL/L (ref 96–108)
CO2 SERPL-SCNC: 29 MMOL/L (ref 21–32)
CREAT SERPL-MCNC: 0.61 MG/DL (ref 0.6–1.3)
EOSINOPHIL # BLD AUTO: 0.02 THOUSAND/ÂΜL (ref 0–0.61)
EOSINOPHIL NFR BLD AUTO: 0 % (ref 0–6)
ERYTHROCYTE [DISTWIDTH] IN BLOOD BY AUTOMATED COUNT: 13.6 % (ref 11.6–15.1)
GFR SERPL CREATININE-BSD FRML MDRD: 85 ML/MIN/1.73SQ M
GLUCOSE SERPL-MCNC: 101 MG/DL (ref 65–140)
GLUCOSE SERPL-MCNC: 108 MG/DL (ref 65–140)
GLUCOSE SERPL-MCNC: 110 MG/DL (ref 65–140)
GLUCOSE SERPL-MCNC: 130 MG/DL (ref 65–140)
GLUCOSE SERPL-MCNC: 98 MG/DL (ref 65–140)
HCT VFR BLD AUTO: 47.1 % (ref 34.8–46.1)
HGB BLD-MCNC: 15.6 G/DL (ref 11.5–15.4)
IMM GRANULOCYTES # BLD AUTO: 0.04 THOUSAND/UL (ref 0–0.2)
IMM GRANULOCYTES NFR BLD AUTO: 0 % (ref 0–2)
LYMPHOCYTES # BLD AUTO: 2.12 THOUSANDS/ÂΜL (ref 0.6–4.47)
LYMPHOCYTES NFR BLD AUTO: 20 % (ref 14–44)
MAGNESIUM SERPL-MCNC: 1.7 MG/DL (ref 1.9–2.7)
MCH RBC QN AUTO: 32.4 PG (ref 26.8–34.3)
MCHC RBC AUTO-ENTMCNC: 33.1 G/DL (ref 31.4–37.4)
MCV RBC AUTO: 98 FL (ref 82–98)
MONOCYTES # BLD AUTO: 1.75 THOUSAND/ÂΜL (ref 0.17–1.22)
MONOCYTES NFR BLD AUTO: 16 % (ref 4–12)
NEUTROPHILS # BLD AUTO: 6.91 THOUSANDS/ÂΜL (ref 1.85–7.62)
NEUTS SEG NFR BLD AUTO: 64 % (ref 43–75)
NRBC BLD AUTO-RTO: 0 /100 WBCS
PLATELET # BLD AUTO: 200 THOUSANDS/UL (ref 149–390)
PMV BLD AUTO: 10.9 FL (ref 8.9–12.7)
POTASSIUM SERPL-SCNC: 3.9 MMOL/L (ref 3.5–5.3)
PROT SERPL-MCNC: 6.1 G/DL (ref 6.4–8.4)
RBC # BLD AUTO: 4.81 MILLION/UL (ref 3.81–5.12)
SODIUM SERPL-SCNC: 140 MMOL/L (ref 135–147)
WBC # BLD AUTO: 10.86 THOUSAND/UL (ref 4.31–10.16)

## 2024-01-19 PROCEDURE — 99233 SBSQ HOSP IP/OBS HIGH 50: CPT | Performed by: FAMILY MEDICINE

## 2024-01-19 PROCEDURE — 85025 COMPLETE CBC W/AUTO DIFF WBC: CPT | Performed by: INTERNAL MEDICINE

## 2024-01-19 PROCEDURE — 80053 COMPREHEN METABOLIC PANEL: CPT | Performed by: INTERNAL MEDICINE

## 2024-01-19 PROCEDURE — 99232 SBSQ HOSP IP/OBS MODERATE 35: CPT | Performed by: INTERNAL MEDICINE

## 2024-01-19 PROCEDURE — 97163 PT EVAL HIGH COMPLEX 45 MIN: CPT

## 2024-01-19 PROCEDURE — 83735 ASSAY OF MAGNESIUM: CPT | Performed by: INTERNAL MEDICINE

## 2024-01-19 PROCEDURE — 82948 REAGENT STRIP/BLOOD GLUCOSE: CPT

## 2024-01-19 PROCEDURE — 97167 OT EVAL HIGH COMPLEX 60 MIN: CPT

## 2024-01-19 RX ORDER — MAGNESIUM SULFATE HEPTAHYDRATE 40 MG/ML
2 INJECTION, SOLUTION INTRAVENOUS ONCE
Status: COMPLETED | OUTPATIENT
Start: 2024-01-19 | End: 2024-01-19

## 2024-01-19 RX ORDER — OLANZAPINE 10 MG/2ML
2.5 INJECTION, POWDER, FOR SOLUTION INTRAMUSCULAR 4 TIMES DAILY PRN
Status: DISCONTINUED | OUTPATIENT
Start: 2024-01-19 | End: 2024-01-20

## 2024-01-19 RX ORDER — DEXTROSE AND SODIUM CHLORIDE 5; .45 G/100ML; G/100ML
75 INJECTION, SOLUTION INTRAVENOUS CONTINUOUS
Status: DISCONTINUED | OUTPATIENT
Start: 2024-01-19 | End: 2024-01-20

## 2024-01-19 RX ADMIN — MAGNESIUM SULFATE HEPTAHYDRATE 2 G: 40 INJECTION, SOLUTION INTRAVENOUS at 21:03

## 2024-01-19 RX ADMIN — LISINOPRIL 10 MG: 10 TABLET ORAL at 09:24

## 2024-01-19 RX ADMIN — Medication 2000 UNITS: at 09:24

## 2024-01-19 RX ADMIN — METOPROLOL SUCCINATE 50 MG: 50 TABLET, EXTENDED RELEASE ORAL at 09:24

## 2024-01-19 RX ADMIN — FAMOTIDINE 20 MG: 20 TABLET ORAL at 09:23

## 2024-01-19 RX ADMIN — APIXABAN 5 MG: 5 TABLET, FILM COATED ORAL at 09:24

## 2024-01-19 RX ADMIN — LORAZEPAM 0.5 MG: 0.5 TABLET ORAL at 02:37

## 2024-01-19 RX ADMIN — ACETAMINOPHEN 975 MG: 325 TABLET, FILM COATED ORAL at 02:36

## 2024-01-19 RX ADMIN — DEXTROSE AND SODIUM CHLORIDE 75 ML/HR: 5; .45 INJECTION, SOLUTION INTRAVENOUS at 20:48

## 2024-01-19 NOTE — PLAN OF CARE
Problem: Potential for Falls  Goal: Patient will remain free of falls  Description: INTERVENTIONS:  - Educate patient/family on patient safety including physical limitations  - Instruct patient to call for assistance with activity   - Consult OT/PT to assist with strengthening/mobility   - Keep Call bell within reach  - Keep bed low and locked with side rails adjusted as appropriate  - Keep care items and personal belongings within reach  - Initiate and maintain comfort rounds  - Offer Toileting every 2 Hours, in advance of need  - Initiate/Maintain bed alarm  - Obtain necessary fall risk management equipment: fall mat  - Apply yellow socks and bracelet for high fall risk patients  - Consider moving patient to room near nurses station  Outcome: Progressing     Problem: GENITOURINARY - ADULT  Goal: Maintains or returns to baseline urinary function  Description: INTERVENTIONS:  - Assess urinary function  - Encourage oral fluids to ensure adequate hydration if ordered  - Administer IV fluids as ordered to ensure adequate hydration  - Administer ordered medications as needed  - Offer frequent toileting  - Follow urinary retention protocol if ordered  Outcome: Progressing

## 2024-01-19 NOTE — PROGRESS NOTES
Progress Note - Geriatric Medicine   Tracy Rock 83 y.o. female MRN: [id number]  Unit/Bed#: -01 Encounter: [id number]      Assessment/Plan:  Polycythemia  Assessment & Plan  Hemoglobin 15.6 trending down  Monitor CBC  CT chest abdomen and pelvis no acute pathology  Consider heme/onc consult    Encephalopathy  Assessment & Plan  Patient presented to the hospital with hypoactive delirium.  Per nursing staff she was agitated intermittently during her hospital stay and she required physical restraint.\  - hypoactive delirium at the time of encounter  -Patient is high risk of delirium due to dementia at baseline, change in environment, cellulitis and influenza A infection  - Tamiflu could have been contributing to her change in mental status  -continue delirium precautions  -maintain normal sleep/wake cycle-hold melatonin  -minimize overnight interruptions, group overnight vitals/labs/nursing checks as possible  -dim lights, close blinds and turn off tv to minimize stimulation and encourage sleep environment in evenings  -ensure that pain is well controlled continue Tylenol 975mg Q8H scheduled   -monitor for fecal and urinary retention which may precipitate delirium  -encourage early mobilization and ambulation  -provide frequent reorientation and redirection  -encourage family and friends at the bedside to help calm patient if anxious  -avoid medications which may precipitate or worsen delirium such as tramadol, benzodiazepine, anticholinergics, and antihistaminics  -encourage hydration and nutrition , assist with feeding if needed  -redirect unwanted behaviors as first line, avoid physical restraints.   -Continue aspiration precautions,  speech therapy follows  -disContinued Lexapro and Zyprexa as well as gabapentin.    -CT head showed no acute pathology  - if worsening or no improvement consider Neurology consult r/o possible seizure disorder      Influenza A  Assessment & Plan  Completed Tamiflu-which could  have contributed to her acute encephalopathy  Patient saturates well on room air  Continue supportive care    Ambulatory dysfunction  Assessment & Plan  Patient uses a walker for ambulation at baseline  Monitor orthostatic vital signs  Encourage p.o. hydration  Avoid hypotension and hypoglycemia   Continue PT OT    Dementia ([facility])  Assessment & Plan  Severe dementia with behavioral disturbance at baseline  Patient needs assistance with all IADLs and most of her ADLs  Will continue to provide supportive care, reorient as needed.  Patient is at high risk for delirium, will monitor closely and place on delirium precautions.  Maintain sleep/wake cycle.  Optimize pain regimen.  Monitor for constipation and urinary retention and manage as needed.  B12 level and TSH within normal limits  Encourage family to visit.  Encourage to wear glasses and hearing aids while awake.  Encourage po intake, assist with feeding if needed.   Discontinued Lexapro gabapentin and Zyprexa at this time due to hypoactive delirium- will continue to hold medications for now   Hold melatonin 3 mg nightly    Atrial fibrillation ([facility])  Assessment & Plan  Currently rate controlled  Continue metoprolol and anticoagulation with Eliquis  Monitor electrolytes    Type 2 diabetes mellitus ([facility])  Assessment & Plan  Lab Results   Component Value Date    HGBA1C 7.6 (H) 01/17/2024       Recent Labs     01/18/24  1610 01/18/24  2053 01/19/24  0720 01/19/24  1113   POCGLU 163* 138 101 110       Blood Sugar Average: Last 72 hrs:  (P) [id number]      Avoid hypoglycemia  Goal for hemoglobin A1c for patient age and comorbidities will be 8.5-9.0    * Sepsis ([facility])  Assessment & Plan  Patient met the sepsis criteria on admission to the hospital  Vitals signs are stable  Completed antibiotic treatment  for right lower extremity cellulitis       Subjective:   Patient seen and examined at bedside for geriatric follow-up. At the time of encounter patient is  "somnolent, not answering questions, moans at verbal and tactile stimuli. No po intake as per nurse. No acute events to report    Review of Systems   Unable to perform ROS: Mental status change         Objective:     Vitals: Blood pressure 146/77, pulse 99, temperature (!) 97.3 °F (36.3 °C), temperature source Tympanic, resp. rate 20, height 5' 7\" (1.702 m), weight 92 kg (202 lb 13.2 oz), SpO2 96%.,Body mass index is 31.77 kg/m².      Intake/Output Summary (Last 24 hours) at 1/19/2024 1518  Last data filed at 1/19/2024 1513  Gross per 24 hour   Intake 460 ml   Output 1520 ml   Net -1060 ml       Current Medications: Reviewed    Physical Exam:   Physical Exam  Vitals and nursing note reviewed.   Constitutional:       General: She is not in acute distress.     Appearance: She is well-developed.   HENT:      Head: Normocephalic and atraumatic.      Mouth/Throat:      Mouth: Mucous membranes are dry.   Eyes:      Conjunctiva/sclera: Conjunctivae normal.   Cardiovascular:      Rate and Rhythm: Normal rate and regular rhythm.      Heart sounds: Heart sounds are distant. No murmur heard.  Pulmonary:      Effort: Pulmonary effort is normal. No respiratory distress.      Breath sounds: Normal breath sounds.   Abdominal:      Palpations: Abdomen is soft.      Tenderness: There is no abdominal tenderness.   Musculoskeletal:         General: No swelling.      Cervical back: Neck supple.      Right lower leg: Edema (trace) present.      Left lower leg: Edema (trace) present.   Skin:     General: Skin is warm and dry.      Capillary Refill: Capillary refill takes less than 2 seconds.   Neurological:      Mental Status: She is alert.      Comments: Somnolent , not answering questions, moaning to verbal and tactile stimuli   Psychiatric:         Mood and Affect: Mood normal.          Invasive Devices       Peripheral Intravenous Line  Duration             Peripheral IV 01/18/24 Right;Ventral (anterior) Forearm 1 day              "       Lab, Imaging and other studies: I have personally reviewed pertinent reports.
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
## 2024-01-19 NOTE — PLAN OF CARE
Problem: Potential for Falls  Goal: Patient will remain free of falls  Description: INTERVENTIONS:  - Educate patient/family on patient safety including physical limitations  - Instruct patient to call for assistance with activity   - Consult OT/PT to assist with strengthening/mobility   - Keep Call bell within reach  - Keep bed low and locked with side rails adjusted as appropriate  - Keep care items and personal belongings within reach  - Initiate and maintain comfort rounds  - Make Fall Risk Sign visible to staff  - Offer Toileting every 2 Hours, in advance of need  - Initiate/Maintain bed alarm  - Obtain necessary fall risk management equipment: floor cushion  - Apply yellow socks and bracelet for high fall risk patients  - Consider moving patient to room near nurses station  Outcome: Progressing     Problem: NEUROSENSORY - ADULT  Goal: Achieves stable or improved neurological status  Description: INTERVENTIONS  - Monitor and report changes in neurological status  - Monitor vital signs such as temperature, blood pressure, glucose, and any other labs ordered   - Initiate measures to prevent increased intracranial pressure  - Monitor for seizure activity and implement precautions if appropriate      Outcome: Progressing     Problem: CARDIOVASCULAR - ADULT  Goal: Maintains optimal cardiac output and hemodynamic stability  Description: INTERVENTIONS:  - Monitor I/O, vital signs and rhythm  - Monitor for S/S and trends of decreased cardiac output  - Administer and titrate ordered vasoactive medications to optimize hemodynamic stability  - Assess quality of pulses, skin color and temperature  - Assess for signs of decreased coronary artery perfusion  - Instruct patient to report change in severity of symptoms  Outcome: Progressing     Problem: GENITOURINARY - ADULT  Goal: Maintains or returns to baseline urinary function  Description: INTERVENTIONS:  - Assess urinary function  - Encourage oral fluids to ensure  adequate hydration if ordered  - Administer IV fluids as ordered to ensure adequate hydration  - Administer ordered medications as needed  - Offer frequent toileting  - Follow urinary retention protocol if ordered  Outcome: Progressing     Problem: GENITOURINARY - ADULT  Goal: Absence of urinary retention  Description: INTERVENTIONS:  - Assess patient’s ability to void and empty bladder  - Monitor I/O  - Bladder scan as needed  - Discuss with physician/AP medications to alleviate retention as needed  - Discuss catheterization for long term situations as appropriate  Outcome: Progressing

## 2024-01-20 PROBLEM — E44.0 MODERATE PROTEIN-CALORIE MALNUTRITION (HCC): Status: ACTIVE | Noted: 2024-01-20

## 2024-01-20 LAB
ALBUMIN SERPL BCP-MCNC: 2.8 G/DL (ref 3.5–5)
ALP SERPL-CCNC: 66 U/L (ref 34–104)
ALT SERPL W P-5'-P-CCNC: 8 U/L (ref 7–52)
ANION GAP SERPL CALCULATED.3IONS-SCNC: 7 MMOL/L
AST SERPL W P-5'-P-CCNC: 28 U/L (ref 13–39)
BASOPHILS # BLD AUTO: 0.03 THOUSANDS/ÂΜL (ref 0–0.1)
BASOPHILS NFR BLD AUTO: 0 % (ref 0–1)
BILIRUB SERPL-MCNC: 1.14 MG/DL (ref 0.2–1)
BUN SERPL-MCNC: 11 MG/DL (ref 5–25)
CALCIUM ALBUM COR SERPL-MCNC: 10.7 MG/DL (ref 8.3–10.1)
CALCIUM SERPL-MCNC: 9.7 MG/DL (ref 8.4–10.2)
CHLORIDE SERPL-SCNC: 105 MMOL/L (ref 96–108)
CO2 SERPL-SCNC: 26 MMOL/L (ref 21–32)
CREAT SERPL-MCNC: 0.54 MG/DL (ref 0.6–1.3)
EOSINOPHIL # BLD AUTO: 0.04 THOUSAND/ÂΜL (ref 0–0.61)
EOSINOPHIL NFR BLD AUTO: 0 % (ref 0–6)
ERYTHROCYTE [DISTWIDTH] IN BLOOD BY AUTOMATED COUNT: 13.4 % (ref 11.6–15.1)
GFR SERPL CREATININE-BSD FRML MDRD: 88 ML/MIN/1.73SQ M
GLUCOSE SERPL-MCNC: 148 MG/DL (ref 65–140)
GLUCOSE SERPL-MCNC: 149 MG/DL (ref 65–140)
GLUCOSE SERPL-MCNC: 152 MG/DL (ref 65–140)
HCT VFR BLD AUTO: 48.9 % (ref 34.8–46.1)
HGB BLD-MCNC: 15.9 G/DL (ref 11.5–15.4)
IMM GRANULOCYTES # BLD AUTO: 0.03 THOUSAND/UL (ref 0–0.2)
IMM GRANULOCYTES NFR BLD AUTO: 0 % (ref 0–2)
LYMPHOCYTES # BLD AUTO: 1.92 THOUSANDS/ÂΜL (ref 0.6–4.47)
LYMPHOCYTES NFR BLD AUTO: 20 % (ref 14–44)
MAGNESIUM SERPL-MCNC: 1.8 MG/DL (ref 1.9–2.7)
MCH RBC QN AUTO: 32.1 PG (ref 26.8–34.3)
MCHC RBC AUTO-ENTMCNC: 32.5 G/DL (ref 31.4–37.4)
MCV RBC AUTO: 99 FL (ref 82–98)
MONOCYTES # BLD AUTO: 1.29 THOUSAND/ÂΜL (ref 0.17–1.22)
MONOCYTES NFR BLD AUTO: 13 % (ref 4–12)
MRSA NOSE QL CULT: NORMAL
NEUTROPHILS # BLD AUTO: 6.38 THOUSANDS/ÂΜL (ref 1.85–7.62)
NEUTS SEG NFR BLD AUTO: 67 % (ref 43–75)
NRBC BLD AUTO-RTO: 0 /100 WBCS
PLATELET # BLD AUTO: 233 THOUSANDS/UL (ref 149–390)
PMV BLD AUTO: 11.4 FL (ref 8.9–12.7)
POTASSIUM SERPL-SCNC: 3.8 MMOL/L (ref 3.5–5.3)
PROT SERPL-MCNC: 6.4 G/DL (ref 6.4–8.4)
RBC # BLD AUTO: 4.96 MILLION/UL (ref 3.81–5.12)
SODIUM SERPL-SCNC: 138 MMOL/L (ref 135–147)
WBC # BLD AUTO: 9.69 THOUSAND/UL (ref 4.31–10.16)

## 2024-01-20 PROCEDURE — 85025 COMPLETE CBC W/AUTO DIFF WBC: CPT | Performed by: INTERNAL MEDICINE

## 2024-01-20 PROCEDURE — 83735 ASSAY OF MAGNESIUM: CPT | Performed by: INTERNAL MEDICINE

## 2024-01-20 PROCEDURE — 80053 COMPREHEN METABOLIC PANEL: CPT | Performed by: INTERNAL MEDICINE

## 2024-01-20 PROCEDURE — 82948 REAGENT STRIP/BLOOD GLUCOSE: CPT

## 2024-01-20 PROCEDURE — 99232 SBSQ HOSP IP/OBS MODERATE 35: CPT | Performed by: INTERNAL MEDICINE

## 2024-01-20 RX ORDER — LORAZEPAM 2 MG/ML
0.5 INJECTION INTRAMUSCULAR EVERY 6 HOURS PRN
Status: DISCONTINUED | OUTPATIENT
Start: 2024-01-20 | End: 2024-01-23 | Stop reason: HOSPADM

## 2024-01-20 RX ADMIN — LORAZEPAM 0.5 MG: 2 INJECTION INTRAMUSCULAR; INTRAVENOUS at 20:52

## 2024-01-20 NOTE — MALNUTRITION/BMI
This medical record reflects one or more clinical indicators suggestive of malnutrition.    Malnutrition Findings:   Adult Malnutrition type: Chronic illness  Adult Degree of Malnutrition: Malnutrition of moderate degree  Malnutrition Characteristics: Fat loss, Muscle loss, Weight loss                  360 Statement: Moderate chronic malnutrition r/t inadequate energy intake overtime as evidenced by wt decreae of 38#/15.8% x 3mo, moderate muscle wasting and fat loss (clavicles, temples, orbitals).  Will treat with nutrition therapy, PO diet, supplements.    BMI Findings:           Body mass index is 31.77 kg/m².     See Nutrition note dated 01/19/24 for additional details.  Completed nutrition assessment is viewable in the nutrition documentation.

## 2024-01-20 NOTE — NURSING NOTE
During physical assessment IV was noted to be infiltrated. New IV was placed but on arm with limb alert. Dr. Salas was made aware. Said okay to use.

## 2024-01-20 NOTE — DISCHARGE SUMMARY
[facility]  Discharge- Tracy Rock 1940, 83 y.o. female MRN: [id number]  Unit/Bed#: -01 Encounter: [id number]  Primary Care Provider: Bertha Isaac DO   Date and time admitted to hospital: 1/13/2024  1:18 PM    Goals of care, counseling/discussion  Assessment & Plan  Goals of care discussion was held with the son Mr. Ferrell at bedside, and would want to keep her comfortable and want to make her DNR/DNI status.  Patient however has not improved in the last few days and has been more lethargic for more than 72 hours.  So far all the workup has been negative including CT head and CT chest and abdomen.  Patient has advanced dementia and has declined in the past few months as per the son.  He would like her to go back to  assisted living however would want to talk with the hospice team and keep her comfortable at the facility.  1/20/24-plan was discussed again with the patient's son Mr. Ferrell and agrees to keep her comfortable and on hospice and [facility] will be able to take Mrs. Figueroa back on Monday on hospice service at the facility.  Will give comfort medication with Ativan as needed and pleasure feed  Will discontinue all p.o. medication      Essential hypertension  Assessment & Plan  Will discontinue p.o. antihypertensive.    Atrial fibrillation (HCC)  Assessment & Plan  Patient unable to take p.o. medication and will discontinue Eliquis,          VTE Pharmacologic Prophylaxis:   High Risk (Score >/= 5) - Pharmacological DVT Prophylaxis Contraindicated. Sequential Compression Devices Ordered.    Mobility:   Basic Mobility Inpatient Raw Score: 6  -HLM Goal: 2: Bed activities/Dependent transfer  -HLM Achieved: 1: Laying in bed  HLM Goal achieved. Continue to encourage appropriate mobility.    Patient Centered Rounds: I performed bedside rounds with nursing staff today.   Discussions with Specialists or Other Care Team Provider: d/w case management      Education and Discussions with Family / Patient: Updated  (son) via phone.    Total Time Spent on Date of Encounter in care of patient: 45 mins. This time was spent on one or more of the following: performing physical exam; counseling and coordination of care; obtaining or reviewing history; documenting in the medical record; reviewing/ordering tests, medications or procedures; communicating with other healthcare professionals and discussing with patient's family/caregivers.    Current Length of Stay: 7 day(s)  Current Patient Status: Inpatient   Certification Statement: The patient will continue to require additional inpatient hospital stay due to    Discharge Plan: Anticipate discharge in 48 hrs to prior assisted or independent living facility.    Code Status: Level 4 - Comfort Care    Subjective:   Patient is lethargic, unable to arouse, not safe for p.o. medication  Patient had IV access in the right upper extremity however noted to have infiltrated and right upper extremity swelling noted  Left upper extremity IV access was obtained last night as she is a difficult IV stick covered ,    Objective:     Vitals:   Temp (24hrs), Av.1 °F (36.7 °C), Min:97.3 °F (36.3 °C), Max:98.6 °F (37 °C)    Temp:  [97.3 °F (36.3 °C)-98.6 °F (37 °C)] 98.6 °F (37 °C)  HR:  [83-99] 90  Resp:  [17-20] 17  BP: (128-147)/(62-77) 147/75  SpO2:  [94 %-96 %] 94 %  Body mass index is 31.77 kg/m².     Input and Output Summary (last 24 hours):     Intake/Output Summary (Last 24 hours) at 2024 1150  Last data filed at 2024 1513  Gross per 24 hour   Intake --   Output 500 ml   Net -500 ml       Physical Exam:   Physical Exam   HEENT-PERRLA, dry oral mucosa  Neck-supple, no JVD elevation   Respiratory-decreased air entry bilaterally, cardiovascular system-S1, S2 heard, no murmur or gallops or rubs  Abdomen-soft, nontender, no guarding or rigidity, bowel sounds heard  Extremities-no pedal edema  Peripheral pulses  palpable  Musculoskeletal-no contractures  Central nervous system-lethargic, confused, skin-no rash noted       Additional Data:     Labs:  Results from last 7 days   Lab Units 01/20/24  0536   WBC Thousand/uL 9.69   HEMOGLOBIN g/dL 15.9*   HEMATOCRIT % 48.9*   PLATELETS Thousands/uL 233   NEUTROS PCT % 67   LYMPHS PCT % 20   MONOS PCT % 13*   EOS PCT % 0     Results from last 7 days   Lab Units 01/20/24  0536   SODIUM mmol/L 138   POTASSIUM mmol/L 3.8   CHLORIDE mmol/L 105   CO2 mmol/L 26   BUN mg/dL 11   CREATININE mg/dL 0.54*   ANION GAP mmol/L 7   CALCIUM mg/dL 9.7   ALBUMIN g/dL 2.8*   TOTAL BILIRUBIN mg/dL 1.14*   ALK PHOS U/L 66   ALT U/L 8   AST U/L 28   GLUCOSE RANDOM mg/dL 149*         Results from last 7 days   Lab Units 01/20/24  1059 01/20/24  0714 01/19/24  2033 01/19/24  1512 01/19/24  1113 01/19/24  0720 01/18/24  2053 01/18/24  1610 01/18/24  1102 01/18/24  0712 01/17/24  2042 01/17/24  1632   POC GLUCOSE mg/dl 152* 148* 98 108 110 101 138 163* 133 101 108 145*     Results from last 7 days   Lab Units 01/17/24  0501   HEMOGLOBIN A1C % 7.6*     Results from last 7 days   Lab Units 01/14/24  0458 01/13/24  1417   LACTIC ACID mmol/L  --  1.4   PROCALCITONIN ng/ml 0.06 0.07       Lines/Drains:  Invasive Devices       Peripheral Intravenous Line  Duration             Peripheral IV 01/19/24 Dorsal (posterior);Left Forearm <1 day                          Imaging: Personally reviewed the following imaging: chest CT scan, abdominal/pelvic CT, and CT head    Recent Cultures (last 7 days):   Results from last 7 days   Lab Units 01/13/24  1600 01/13/24  1417   BLOOD CULTURE   --  No Growth After 5 Days.  No Growth After 5 Days.   URINE CULTURE  No Growth <1000 cfu/mL  --        Last 24 Hours Medication List:   Current Facility-Administered Medications   Medication Dose Route Frequency Provider Last Rate    acetaminophen  975 mg Oral Q8H Devorah Salas MD      LORazepam  0.5 mg Intravenous Q6H PRN  Devorah Salas MD      trimethobenzamide  200 mg Intramuscular Q6H PRN Charley Carrasco MD          Today, Patient Was Seen By: Devorah Salas MD    **Please Note: This note may have been constructed using a voice recognition system.**

## 2024-01-20 NOTE — PROGRESS NOTES
Critical access hospital  Progress Note  Name: Tracy Rock I  MRN: [id number]  Unit/Bed#: -01 I Date of Admission: 1/13/2024   Date of Service: 1/19/2024 I Hospital Day: 6    Assessment/Plan   Goals of care, counseling/discussion  Assessment & Plan  Goals of care discussion was held with the son Mr. Ferrell at bedside, and would want to keep her comfortable and want to make her DNR/DNI status.  Patient however has not improved in the last few days and has been more lethargic for more than 72 hours.  So far all the workup has been negative including CT head and CT chest and abdomen.  Patient has advanced dementia and has declined in the past few months as per the son.  He would like her to go back to  assisted living however would want to talk with the hospice team and keep her comfortable at the facility.    Polycythemia  Assessment & Plan  Elevated hb likely with poor po intake and dehydration will give IV fluids , also has CHF , hold lasix and avoid fluid overload       Encephalopathy  Assessment & Plan  Patient has severe dementia with behavioral disturbance at baseline,  Geriatric input highly appreciated,  Delirium with hospitalization and acute infection with influenza A and cellulitis of the lower extremity which has improved,Will discontinue IV antibiotic with Ancef  However patient has decreased p.o. intake and confusion persist with needing restraints  Will continue with Tylenol scheduled dose  BuSpar has been discontinued and started on gabapentin  Maintain sleep-wake cycle  Avoid constipation and retention of urine  Will continue Lexapro and Zyprexa and nightly dose of melatonin   Will get CT brain , stat , r/o intracranial pathology     Chronic systolic heart failure (HCC)  Assessment & Plan  Last EF of 20-25% in October 2021  On diuresis with Lasix and beta-blockade with Toprol-XL  Low-sodium diet with fluid restriction enforced  Monitor/replete serum potassium/magnesium as  necessary  Will hold lasix due to dehydration and decreased PO intake     History of breast cancer  Assessment & Plan  Status post left mastectomy  Outpatient follow-up    Hyperlipidemia  Assessment & Plan  Continue statin    Hypomagnesemia  Assessment & Plan  Monitor/replete serum magnesium and potassium    Cellulitis of right lower extremity  Assessment & Plan  Given a dose of IV Cefepime in the ED -> de-escalated to IV Ancef  Cefazolin discontinued   Preliminary read from right lower extremity venous duplex study negative for DVT   1/17  improved rt leg swelling and redness , will dc abx     Influenza A  Assessment & Plan  Contact/droplet precautions  Completed tamiflu today  Supportive care  Maintain oxygenation    Ambulatory dysfunction  Assessment & Plan  Cont PT/OT when more awake     Dementia (HCC)  Assessment & Plan  Supportive care  Continue Lexapro/Zyprexa and PRN Ativan  Discontinue buspar  Has required intermittent course of restraints ,now will take off restraints and see     Atrial fibrillation ([facility])  Assessment & Plan  Cont eliquis and toprol XL     GERD (gastroesophageal reflux disease)  Assessment & Plan  Continue Pepcid    Type 2 diabetes mellitus ([facility])  Assessment & Plan  Lab Results   Component Value Date    HGBA1C 7.6 (H) 01/17/2024     Diet controlled at home  Continue SSI coverage per Accu-Cheks while hospitalized  Carbohydrate restriction  Hypoglycemia protocol  Diet is downgraded - pureed and nectar thick liquid   Will make npo status as she is still lethargic     Essential hypertension  Assessment & Plan  Low sodium restriction  Continue Zestril/Toprol-XL - prn IV hydralazine     Atrial fibrillation ([facility])  Assessment & Plan  Chronic rate controlled with Toprol-XL  Cont eliquis   [id number] score of 8    * Sepsis ([facility])  Assessment & Plan  Presents with tachycardia/tachypnea and nursing home reports of recurrent fevers over the last few days  Etiology suspected to be secondary to a small  developing right lower extremity lightheadedness coupled with influenza A (plan for individual assessments)  Monitor vitals and maintain hemodynamics - keep MAP > 65 -> currently at goal, and in the setting of chronic systolic CHF, IV fluid resuscitation not indicated  Blood cultures from 1/13 remain negative thus far - urinalysis negative for infection - CXR, on personal review, without focal infiltrates but mild bilateral reticular-type opacities vs vascular congestion (known CHF history)  Lactic acid normal - procalcitonin negative x 2                VTE Pharmacologic Prophylaxis:   Moderate Risk (Score 3-4) - Pharmacological DVT Prophylaxis Ordered: apixaban (Eliquis).    Mobility:   Basic Mobility Inpatient Raw Score: 6  JH-HLM Goal: 2: Bed activities/Dependent transfer  JH-HLM Achieved: 1: Laying in bed  HLM Goal achieved. Continue to encourage appropriate mobility.    Patient Centered Rounds: I performed bedside rounds with nursing staff today.   Discussions with Specialists or Other Care Team Provider: d/w speech and geriatrics     Education and Discussions with Family / Patient: Updated  (son) at bedside.    Total Time Spent on Date of Encounter in care of patient: 45 mins. This time was spent on one or more of the following: performing physical exam; counseling and coordination of care; obtaining or reviewing history; documenting in the medical record; reviewing/ordering tests, medications or procedures; communicating with other healthcare professionals and discussing with patient's family/caregivers.    Current Length of Stay: 6 day(s)  Current Patient Status: Inpatient   Certification Statement: The patient will continue to require additional inpatient hospital stay due to encephalopathy  Discharge Plan: Anticipate discharge in 48 hrs to rehab facility.    Code Status: Level 3 - DNAR and DNI    Subjective:   Pt is lethargic , pt barely opens eyes ,pt has no fever     Objective:     Vitals:    Temp (24hrs), Av.8 °F (36.6 °C), Min:97.3 °F (36.3 °C), Max:98.7 °F (37.1 °C)    Temp:  [97.3 °F (36.3 °C)-98.7 °F (37.1 °C)] 97.3 °F (36.3 °C)  HR:  [65-99] 99  Resp:  [18-22] 20  BP: (134-146)/(77-88) 146/77  SpO2:  [92 %-97 %] 96 %  Body mass index is 31.77 kg/m².     Input and Output Summary (last 24 hours):     Intake/Output Summary (Last 24 hours) at 2024  Last data filed at 2024 1513  Gross per 24 hour   Intake 460 ml   Output 720 ml   Net -260 ml       Physical Exam:   Physical Exam   HEENT-PERRLA, dry oral mucosa  Neck-supple, no JVD elevation   Respiratory-decreased air entry b/l   Cardiovascular system-S1, S2 heard, no murmur or gallops or rubs  Abdomen-soft, nontender, no guarding or rigidity, bowel sounds heard  Extremities-no pedal edema  Peripheral pulses palpable  Musculoskeletal-no contractures  Central nervous system-lethargic , tries to open eyes , nonverbal , confused   Skin-no rash noted       Additional Data:     Labs:  Results from last 7 days   Lab Units 24  0439   WBC Thousand/uL 10.86*   HEMOGLOBIN g/dL 15.6*   HEMATOCRIT % 47.1*   PLATELETS Thousands/uL 200   NEUTROS PCT % 64   LYMPHS PCT % 20   MONOS PCT % 16*   EOS PCT % 0     Results from last 7 days   Lab Units 24  0439   SODIUM mmol/L 140   POTASSIUM mmol/L 3.9   CHLORIDE mmol/L 106   CO2 mmol/L 29   BUN mg/dL 13   CREATININE mg/dL 0.61   ANION GAP mmol/L 5   CALCIUM mg/dL 9.6   ALBUMIN g/dL 2.8*   TOTAL BILIRUBIN mg/dL 1.32*   ALK PHOS U/L 65   ALT U/L 8   AST U/L 31   GLUCOSE RANDOM mg/dL 130         Results from last 7 days   Lab Units 24  2033 24  1512 24  1113 24  0720 2418/24  1610 24  1102 24  0712 24  2042 24  1632 24  1120 24  0724   POC GLUCOSE mg/dl 98 108 110 101 138 163* 133 101 108 145* 135 127     Results from last 7 days   Lab Units 24  0501   HEMOGLOBIN A1C % 7.6*     Results from last 7 days   Lab Units  01/14/24  0458 01/13/24  1417   LACTIC ACID mmol/L  --  1.4   PROCALCITONIN ng/ml 0.06 0.07       Lines/Drains:  Invasive Devices       Peripheral Intravenous Line  Duration             Peripheral IV 01/18/24 Right;Ventral (anterior) Forearm 1 day                          Imaging: Personally reviewed the following imaging: chest CT scan, abdominal/pelvic CT, and CT head    Recent Cultures (last 7 days):   Results from last 7 days   Lab Units 01/13/24  1600 01/13/24  1417   BLOOD CULTURE   --  No Growth After 5 Days.  No Growth After 5 Days.   URINE CULTURE  No Growth <1000 cfu/mL  --        Last 24 Hours Medication List:   Current Facility-Administered Medications   Medication Dose Route Frequency Provider Last Rate    acetaminophen  650 mg Oral Q6H PRN Charley Carrasco MD      acetaminophen  975 mg Oral Q8H Devorah Salas MD      apixaban  5 mg Oral BID Charley Carrasco MD      atorvastatin  10 mg Oral Daily With Dinner Charley Carrasco MD      cholecalciferol  2,000 Units Oral Daily Charley Carrasco MD      dextrose 5 % and sodium chloride 0.45 %  75 mL/hr Intravenous Continuous Devorah Salas MD      famotidine  20 mg Oral Daily Charley Carrasco MD      guaiFENesin  200 mg Oral Q4H PRN Sarah Cunningham MD      hydrALAZINE  5 mg Intravenous Q6H PRN Charley Carrasco MD      insulin lispro  1-6 Units Subcutaneous 4x Daily (AC & HS) Charley Carrasco MD      lisinopril  10 mg Oral Daily Charley Carrasco MD      LORazepam  0.5 mg Oral Q8H PRN Charley Carrasco MD      metoprolol succinate  50 mg Oral Daily Charley Carrasco MD      OLANZapine  2.5 mg Intramuscular 4x Daily PRN Devorah Salas MD      oxyCODONE  2.5 mg Oral Q6H PRN Charley Carrasco MD      oxyCODONE  5 mg Oral Q6H PRN Charley Carrasco MD      polyethylene glycol  17 g Oral Daily Devorah Salas MD      trimethobenzamide  200 mg Intramuscular Q6H PRN Charley Carrasco MD          Today, Patient Was Seen By: Devorah Salas  MD    **Please Note: This note may have been constructed using a voice recognition system.**

## 2024-01-20 NOTE — PLAN OF CARE
Problem: Prexisting or High Potential for Compromised Skin Integrity  Goal: Skin integrity is maintained or improved  Description: INTERVENTIONS:  - Identify patients at risk for skin breakdown  - Assess and monitor skin integrity  - Assess and monitor nutrition and hydration status  - Monitor labs   - Assess for incontinence   - Turn and reposition patient  - Assist with mobility/ambulation  - Relieve pressure over bony prominences  - Avoid friction and shearing  - Provide appropriate hygiene as needed including keeping skin clean and dry  - Evaluate need for skin moisturizer/barrier cream  - Collaborate with interdisciplinary team   - Patient/family teaching  - Consider wound care consult   Outcome: Progressing     Problem: RESPIRATORY - ADULT  Goal: Achieves optimal ventilation and oxygenation  Description: INTERVENTIONS:  - Assess for changes in respiratory status  - Assess for changes in mentation and behavior  - Position to facilitate oxygenation and minimize respiratory effort  - Oxygen administered by appropriate delivery if ordered  - Initiate smoking cessation education as indicated  - Encourage broncho-pulmonary hygiene including cough, deep breathe, Incentive Spirometry  - Assess the need for suctioning and aspirate as needed  - Assess and instruct to report SOB or any respiratory difficulty  - Respiratory Therapy support as indicated  Outcome: Progressing     Problem: NEUROSENSORY - ADULT  Goal: Achieves stable or improved neurological status  Description: INTERVENTIONS  - Monitor and report changes in neurological status  - Monitor vital signs such as temperature, blood pressure, glucose, and any other labs ordered   - Initiate measures to prevent increased intracranial pressure  - Monitor for seizure activity and implement precautions if appropriate      Outcome: Not Progressing  Goal: Achieves maximal functionality and self care  Description: INTERVENTIONS  - Monitor swallowing and airway patency  with patient fatigue and changes in neurological status  - Encourage and assist patient to increase activity and self care.   - Encourage visually impaired, hearing impaired and aphasic patients to use assistive/communication devices  Outcome: Not Progressing

## 2024-01-20 NOTE — ASSESSMENT & PLAN NOTE
Lab Results   Component Value Date    HGBA1C 7.6 (H) 01/17/2024     Diet controlled at home  Continue SSI coverage per Accu-Cheks while hospitalized  Carbohydrate restriction  Hypoglycemia protocol  Diet is downgraded - pureed and nectar thick liquid   Will make npo status as she is still lethargic

## 2024-01-20 NOTE — ASSESSMENT & PLAN NOTE
Goals of care discussion was held with the son Mr. Ferrell at bedside, and would want to keep her comfortable and want to make her DNR/DNI status.  Patient however has not improved in the last few days and has been more lethargic for more than 72 hours.  So far all the workup has been negative including CT head and CT chest and abdomen.  Patient has advanced dementia and has declined in the past few months as per the son.  He would like her to go back to  assisted living however would want to talk with the hospice team and keep her comfortable at the facility.  1/20/24-plan was discussed again with the patient's son Mr. Ferrell and agrees to keep her comfortable and on hospice and [facility] will be able to take Mrs. Figueroa back on Monday on hospice service at the facility.  Will give comfort medication with Ativan as needed and pleasure feed  Will discontinue all p.o. medication

## 2024-01-20 NOTE — CASE MANAGEMENT
Case Management Discharge Planning Note    Patient name Tracy Rock  Location /-01 MRN [id number]  : 1940 Date 2024       Current Admission Date: 2024  Current Admission Diagnosis:Sepsis ([facility])   Patient Active Problem List    Diagnosis Date Noted    Moderate protein-calorie malnutrition ([facility]) 2024    Goals of care, counseling/discussion 2024    Encephalopathy 2024    Polycythemia 2024    Elevated hemoglobin ([facility]) 2024    Sepsis ([facility]) 2024    Influenza A 2024    Cellulitis of right lower extremity 2024    Hypomagnesemia 2024    Hyperlipidemia 2024    History of breast cancer 2024    Chronic systolic heart failure ([facility]) 2024    Generalized weakness 2024    Dysuria 2023    Ambulatory dysfunction 2023    Cellulitis of left lower extremity 2023    Venous stasis ulcer of left calf ([facility]) 2023    Right hand pain 2022    Dilation of pulmonary artery ([facility]) 2022    Renal calculus 2022    Acute pain of left knee 2022    Acute pain due to trauma 09/15/2022    History of hypertension 09/15/2022    Paroxysmal atrial fibrillation ([facility]) 09/15/2022    Closed compression fracture of L2 lumbar vertebra, initial encounter ([facility]) 2022    Fall from ground level 2022    Fall 2022    Low back pain 2022    Dementia ([facility]) 2022    Recurrent falls 2022    PONV (postoperative nausea and vomiting)     Obesity, morbid ([facility]) 2021    Primary osteoarthritis of left knee 2019    Deep vein thrombosis (DVT) of lower extremity ([facility]) 2017    PE (pulmonary thromboembolism) ([facility]) 2017    DVT (deep venous thrombosis) ([facility]) 2017    Type 2 diabetes mellitus ([facility]) 2017    Incomplete RBBB 2017    Pulmonary emboli ([facility]) 2017    Chronic systolic congestive heart failure ([facility]) 2016    Atrial fibrillation ([facility])  12/02/2016    Essential hypertension 12/02/2016    NICM (nonischemic cardiomyopathy) (HCC) 12/02/2016    Nonischemic cardiomyopathy (HCC) 06/23/2016    Atrial fibrillation (HCC) 02/25/2016    GERD (gastroesophageal reflux disease) 09/05/2012    DDD (degenerative disc disease), lumbosacral 11/19/2007    Thoracic or lumbosacral neuritis or radiculitis 11/19/2007      LOS (days): 7  Geometric Mean LOS (GMLOS) (days): 3.6  Days to GMLOS:-3.2     OBJECTIVE:  Risk of Unplanned Readmission Score: 18.84         Current admission status: Inpatient   Preferred Pharmacy:   [id number] - [place], [place] - [address] [address]  Phone: [phone] Fax: [phone]    [facility] - [facility] [address] [place] [address]  [address] [place] [address]  [address] [place] [address]  Phone: [phone] Fax: [phone]    Primary Care Provider: Bertha Isaac DO    Primary Insurance: MEDICARE  Secondary Insurance: [facility]    DISCHARGE DETAILS:    Discharge planning discussed with:: Ghassan-son, Tracy-[facility]     Comments - Freedom of Choice: ERIC was in contact with Ghassan who confirmed his request for hospice for the Pt upon the Pt's return to [facility]. ERIC was in contact with Tracy GTZ at [facility] regarding the Pt's return to them with hospice. Tracy reported the Pt can return to [facility] on Monday at which time they will sign her on to their hospice agency. ERIC provided Ghassan with an update of the Pt's d/c plan.       Contacts  Patient Contacts: Ghassan  Relationship to Patient:: Family  Contact Method: Phone  Phone Number: [phone]  Reason/Outcome: Discharge Planning    Other Referral/Resources/Interventions Provided:  Interventions: Hospice  Referral Comments: ERIC made a referral to [facility].         Treatment Team Recommendation: Hospice  Discharge Destination Plan::  Hospice  Transport at Discharge : [facility]

## 2024-01-20 NOTE — PROGRESS NOTES
Metabolic encephalopathy related to sepsis as evidenced by confusion and somnolence, treated with b/l wrist restraints, Geriatric Consult, IVF, OT eval, ST eval and neuro assessments.

## 2024-01-20 NOTE — PROGRESS NOTES
[facility]  Progress Note  Name: Tracy Rock I  MRN: [id number]  Unit/Bed#: -01 I Date of Admission: 1/13/2024   Date of Service: 1/20/2024 I Hospital Day: 7    Devorah Salas MD  Physician  Hospitalist       Date of Service: 1/20/2024 11:50 AM     Signed         [facility]   Tracy Rock 1940, 83 y.o. female MRN: [id number]  Unit/Bed#: -01 Encounter: [id number]  Primary Care Provider: Bertha Isaac DO   Date and time admitted to hospital: 1/13/2024  1:18 PM     Goals of care, counseling/discussion  Assessment & Plan  Goals of care discussion was held with the son Mr. Ferrell at bedside, and would want to keep her comfortable and want to make her DNR/DNI status.  Patient however has not improved in the last few days and has been more lethargic for more than 72 hours.  So far all the workup has been negative including CT head and CT chest and abdomen.  Patient has advanced dementia and has declined in the past few months as per the son.  He would like her to go back to  assisted living however would want to talk with the hospice team and keep her comfortable at the facility.  1/20/24-plan was discussed again with the patient's son Mr. Ferrell and agrees to keep her comfortable and on hospice and [facility] will be able to take Mrs. Figueroa back on Monday on hospice service at the facility.  Will give comfort medication with Ativan as needed and pleasure feed  Will discontinue all p.o. medication        Essential hypertension  Assessment & Plan  Will discontinue p.o. antihypertensive.     Atrial fibrillation (HCC)  Assessment & Plan  Patient unable to take p.o. medication and will discontinue Eliquis,              VTE Pharmacologic Prophylaxis:   High Risk (Score >/= 5) - Pharmacological DVT Prophylaxis Contraindicated. Sequential Compression Devices Ordered.     Mobility:   Basic Mobility Inpatient Raw Score: 6  -[facility]  Goal: 2: Bed activities/Dependent transfer  -HLM Achieved: 1: Laying in bed  HLM Goal achieved. Continue to encourage appropriate mobility.     Patient Centered Rounds: I performed bedside rounds with nursing staff today.   Discussions with Specialists or Other Care Team Provider: d/w case management      Education and Discussions with Family / Patient: Updated  (son) via phone.     Total Time Spent on Date of Encounter in care of patient: 45 mins. This time was spent on one or more of the following: performing physical exam; counseling and coordination of care; obtaining or reviewing history; documenting in the medical record; reviewing/ordering tests, medications or procedures; communicating with other healthcare professionals and discussing with patient's family/caregivers.     Current Length of Stay: 7 day(s)  Current Patient Status: Inpatient   Certification Statement: The patient will continue to require additional inpatient hospital stay due to    Discharge Plan: Anticipate discharge in 48 hrs to prior assisted or independent living facility.     Code Status: Level 4 - Comfort Care     Subjective:   Patient is lethargic, unable to arouse, not safe for p.o. medication  Patient had IV access in the right upper extremity however noted to have infiltrated and right upper extremity swelling noted  Left upper extremity IV access was obtained last night as she is a difficult IV stick covered ,     Objective:      Vitals:   Temp (24hrs), Av.1 °F (36.7 °C), Min:97.3 °F (36.3 °C), Max:98.6 °F (37 °C)     Temp:  [97.3 °F (36.3 °C)-98.6 °F (37 °C)] 98.6 °F (37 °C)  HR:  [83-99] 90  Resp:  [17-20] 17  BP: (128-147)/(62-77) 147/75  SpO2:  [94 %-96 %] 94 %  Body mass index is 31.77 kg/m².      Input and Output Summary (last 24 hours):      Intake/Output Summary (Last 24 hours) at 2024 1150  Last data filed at 2024 1513      Gross per 24 hour   Intake --   Output 500 ml   Net -500 ml          Physical Exam:   Physical Exam   HEENT-PERRLA, dry oral mucosa  Neck-supple, no JVD elevation   Respiratory-decreased air entry bilaterally, cardiovascular system-S1, S2 heard, no murmur or gallops or rubs  Abdomen-soft, nontender, no guarding or rigidity, bowel sounds heard  Extremities-no pedal edema  Peripheral pulses palpable  Musculoskeletal-no contractures  Central nervous system-lethargic, confused, skin-no rash noted        Additional Data:      Labs:       Results from last 7 days   Lab Units 01/20/24  0536   WBC Thousand/uL 9.69   HEMOGLOBIN g/dL 15.9*   HEMATOCRIT % 48.9*   PLATELETS Thousands/uL 233   NEUTROS PCT % 67   LYMPHS PCT % 20   MONOS PCT % 13*   EOS PCT % 0           Results from last 7 days   Lab Units 01/20/24  0536   SODIUM mmol/L 138   POTASSIUM mmol/L 3.8   CHLORIDE mmol/L 105   CO2 mmol/L 26   BUN mg/dL 11   CREATININE mg/dL 0.54*   ANION GAP mmol/L 7   CALCIUM mg/dL 9.7   ALBUMIN g/dL 2.8*   TOTAL BILIRUBIN mg/dL 1.14*   ALK PHOS U/L 66   ALT U/L 8   AST U/L 28   GLUCOSE RANDOM mg/dL 149*                          Results from last 7 days   Lab Units 01/20/24  1059 01/20/24  0714 01/19/24  2033 01/19/24  1512 01/19/24  1113 01/19/24  0720 01/18/24  2053 01/18/24  1610 01/18/24  1102 01/18/24  0712 01/17/24  2042 01/17/24  1632   POC GLUCOSE mg/dl 152* 148* 98 108 110 101 138 163* 133 101 108 145*           Results from last 7 days   Lab Units 01/17/24  0501   HEMOGLOBIN A1C % 7.6*            Results from last 7 days   Lab Units 01/14/24  0458 01/13/24  1417   LACTIC ACID mmol/L  --  1.4   PROCALCITONIN ng/ml 0.06 0.07         Lines/Drains:  Invasive Devices         Peripheral Intravenous Line  Duration                Peripheral IV 01/19/24 Dorsal (posterior);Left Forearm <1 day                                   Imaging: Personally reviewed the following imaging: chest CT scan, abdominal/pelvic CT, and CT head     Recent Cultures (last 7 days):         Results from last 7 days   Lab  Units 01/13/24  1600 01/13/24  1417   BLOOD CULTURE    --  No Growth After 5 Days.  No Growth After 5 Days.   URINE CULTURE   No Growth <1000 cfu/mL  --          Last 24 Hours Medication List:            Current Facility-Administered Medications   Medication Dose Route Frequency Provider Last Rate    acetaminophen  975 mg Oral Q8H Devorah Salas MD      LORazepam  0.5 mg Intravenous Q6H PRN Devorah Salas MD      trimethobenzamide  200 mg Intramuscular Q6H PRN Charley Carrasco MD           Today, Patient Was Seen By: Devorah Salas MD     **Please Note: This note may have been constructed using a voice recognition system.**

## 2024-01-20 NOTE — PLAN OF CARE
Problem: Potential for Falls  Goal: Patient will remain free of falls  Description: INTERVENTIONS:  - Educate patient/family on patient safety including physical limitations  - Instruct patient to call for assistance with activity   - Consult OT/PT to assist with strengthening/mobility   - Keep Call bell within reach  - Keep bed low and locked with side rails adjusted as appropriate  - Keep care items and personal belongings within reach  - Initiate and maintain comfort rounds  - Make Fall Risk Sign visible to staff  - Offer Toileting every  Hours, in advance of need  - Initiate/Maintain alarm  - Obtain necessary fall risk management equipment:   - Apply yellow socks and bracelet for high fall risk patients  - Consider moving patient to room near nurses station  1/20/2024 1144 by Soumya Mobley RN  Outcome: Progressing  1/20/2024 1144 by Soumya Mobley RN  Outcome: Progressing     Problem: Prexisting or High Potential for Compromised Skin Integrity  Goal: Skin integrity is maintained or improved  Description: INTERVENTIONS:  - Identify patients at risk for skin breakdown  - Assess and monitor skin integrity  - Assess and monitor nutrition and hydration status  - Monitor labs   - Assess for incontinence   - Turn and reposition patient  - Assist with mobility/ambulation  - Relieve pressure over bony prominences  - Avoid friction and shearing  - Provide appropriate hygiene as needed including keeping skin clean and dry  - Evaluate need for skin moisturizer/barrier cream  - Collaborate with interdisciplinary team   - Patient/family teaching  - Consider wound care consult   Outcome: Progressing     Problem: NEUROSENSORY - ADULT  Goal: Achieves stable or improved neurological status  Description: INTERVENTIONS  - Monitor and report changes in neurological status  - Monitor vital signs such as temperature, blood pressure, glucose, and any other labs ordered   - Initiate measures to prevent increased intracranial  pressure  - Monitor for seizure activity and implement precautions if appropriate      Outcome: Progressing     Problem: CARDIOVASCULAR - ADULT  Goal: Maintains optimal cardiac output and hemodynamic stability  Description: INTERVENTIONS:  - Monitor I/O, vital signs and rhythm  - Monitor for S/S and trends of decreased cardiac output  - Administer and titrate ordered vasoactive medications to optimize hemodynamic stability  - Assess quality of pulses, skin color and temperature  - Assess for signs of decreased coronary artery perfusion  - Instruct patient to report change in severity of symptoms  Outcome: Progressing     Problem: SAFETY ADULT  Goal: Patient will remain free of falls  Description: INTERVENTIONS:  - Educate patient/family on patient safety including physical limitations  - Instruct patient to call for assistance with activity   - Consult OT/PT to assist with strengthening/mobility   - Keep Call bell within reach  - Keep bed low and locked with side rails adjusted as appropriate  - Keep care items and personal belongings within reach  - Initiate and maintain comfort rounds  - Make Fall Risk Sign visible to staff  - Offer Toileting every  Hours, in advance of need  - Initiate/Maintain alarm  - Obtain necessary fall risk management equipment:   - Apply yellow socks and bracelet for high fall risk patients  - Consider moving patient to room near nurses station  1/20/2024 1144 by Soumya Mobley, RN  Outcome: Progressing  1/20/2024 1144 by Soumya Mobley, RN  Outcome: Progressing

## 2024-01-20 NOTE — ASSESSMENT & PLAN NOTE
Goals of care discussion was held with the son Mr. Ferrell at bedside, and would want to keep her comfortable and want to make her DNR/DNI status.  Patient however has not improved in the last few days and has been more lethargic for more than 72 hours.  So far all the workup has been negative including CT head and CT chest and abdomen.  Patient has advanced dementia and has declined in the past few months as per the son.  He would like her to go back to  assisted living however would want to talk with the hospice team and keep her comfortable at the facility.

## 2024-01-22 LAB
SARS-COV-2 AG UPPER RESP QL IA: ABNORMAL
SARS-COV-2 AG UPPER RESP QL IA: POSITIVE

## 2024-01-22 PROCEDURE — 87811 SARS-COV-2 COVID19 W/OPTIC: CPT | Performed by: INTERNAL MEDICINE

## 2024-01-22 PROCEDURE — 99231 SBSQ HOSP IP/OBS SF/LOW 25: CPT | Performed by: INTERNAL MEDICINE

## 2024-01-22 RX ORDER — ACETAMINOPHEN 650 MG/1
650 SUPPOSITORY RECTAL EVERY 4 HOURS PRN
Status: DISCONTINUED | OUTPATIENT
Start: 2024-01-22 | End: 2024-01-23 | Stop reason: HOSPADM

## 2024-01-22 RX ORDER — LORAZEPAM 2 MG/ML
0.5 CONCENTRATE ORAL EVERY 8 HOURS PRN
Qty: 15 ML | Refills: 0 | Status: SHIPPED | OUTPATIENT
Start: 2024-01-22 | End: 2024-01-23 | Stop reason: SDUPTHER

## 2024-01-22 NOTE — PLAN OF CARE
Problem: PAIN - ADULT  Goal: Verbalizes/displays adequate comfort level or baseline comfort level  Description: Interventions:  - Encourage patient to monitor pain and request assistance  - Assess pain using appropriate pain scale  - Administer analgesics based on type and severity of pain and evaluate response  - Implement non-pharmacological measures as appropriate and evaluate response  - Consider cultural and social influences on pain and pain management  - Notify physician/advanced practitioner if interventions unsuccessful or patient reports new pain  Outcome: Progressing      Problem: Nutrition/Hydration-ADULT  Goal: Nutrient/Hydration intake appropriate for improving, restoring or maintaining nutritional needs  Description: Monitor and assess patient's nutrition/hydration status for malnutrition. Collaborate with interdisciplinary team and initiate plan and interventions as ordered.  Monitor patient's weight and dietary intake as ordered or per policy. Utilize nutrition screening tool and intervene as necessary. Determine patient's food preferences and provide high-protein, high-caloric foods as appropriate.         INTERVENTIONS:  - Monitor oral intake, urinary output, labs, and treatment plans  - Assess nutrition and hydration status and recommend course of action  - Evaluate amount of meals eaten  - Assist patient with eating if necessary   - Allow adequate time for meals  - Recommend/ encourage appropriate diets, oral nutritional supplements, and vitamin/mineral supplements  - Order, calculate, and assess calorie counts as needed  - Recommend, monitor, and adjust tube feedings and TPN/PPN based on assessed needs  - Assess need for intravenous fluids  - Provide specific nutrition/hydration education as appropriate  - Include patient/family/caregiver in decisions related to nutrition  Outcome: Progressing         Problem: MUSCULOSKELETAL - ADULT  Goal: Maintain proper alignment of affected body  part  Description: INTERVENTIONS:  - Support, maintain and protect limb and body alignment  - Provide patient/ family with appropriate education  Outcome: Progressing

## 2024-01-22 NOTE — CASE MANAGEMENT
Case Management Progress Note    Patient name Tracy Rock  Location /-01 MRN [id number]  : 1940 Date 2024       LOS (days): 9  Geometric Mean LOS (GMLOS) (days): 5.1  Days to GMLOS:-3.8        OBJECTIVE:        Current admission status: Inpatient  Preferred Pharmacy:   [id number] [place], [place] - [address] [address]  Phone: [phone] Fax: [phone]    [facility] [address] [facility]  [address] [place] [address]  [address]  [facility] [address]  Phone: [phone] Fax: [phone]    Primary Care Provider: Bertha Isaac DO    Primary Insurance: MEDICARE  Secondary Insurance: [facility]    PROGRESS NOTE:    Cm confirmed that patient will dc to [facility] for hospice care. Augustus was asked for COVID test prior to dc. Cm informed by RN that COVID test is now positive. Cm informed facility, that is still able to take, but that beds would need to be re-arranged to accommodate her now positive status. [facility] was asked to arrange transport for tomorrow at 11am.     Cm updated medical providers that dc will happen tomorrow.

## 2024-01-22 NOTE — PLAN OF CARE
Problem: Potential for Falls  Goal: Patient will remain free of falls  Description: INTERVENTIONS:  - Educate patient/family on patient safety including physical limitations  - Instruct patient to call for assistance with activity   - Consult OT/PT to assist with strengthening/mobility   - Keep Call bell within reach  - Keep bed low and locked with side rails adjusted as appropriate  - Keep care items and personal belongings within reach  - Initiate and maintain comfort rounds  - Make Fall Risk Sign visible to staff  - Offer Toileting every 2 Hours, in advance of need  - Initiate/Maintain bed and chair alarm  - Apply yellow socks and bracelet for high fall risk patients  - Consider moving patient to room near nurses station  Outcome: Progressing     Problem: Prexisting or High Potential for Compromised Skin Integrity  Goal: Skin integrity is maintained or improved  Description: INTERVENTIONS:  - Identify patients at risk for skin breakdown  - Assess and monitor skin integrity  - Assess and monitor nutrition and hydration status  - Monitor labs   - Assess for incontinence   - Turn and reposition patient  - Assist with mobility/ambulation  - Relieve pressure over bony prominences  - Avoid friction and shearing  - Provide appropriate hygiene as needed including keeping skin clean and dry  - Evaluate need for skin moisturizer/barrier cream  - Collaborate with interdisciplinary team   - Patient/family teaching  - Consider wound care consult   Outcome: Progressing     Problem: NEUROSENSORY - ADULT  Goal: Achieves stable or improved neurological status  Description: INTERVENTIONS  - Monitor and report changes in neurological status  - Monitor vital signs such as temperature, blood pressure, glucose, and any other labs ordered   - Initiate measures to prevent increased intracranial pressure  - Monitor for seizure activity and implement precautions if appropriate      Outcome: Progressing  Goal: Achieves maximal  functionality and self care  Description: INTERVENTIONS  - Monitor swallowing and airway patency with patient fatigue and changes in neurological status  - Encourage and assist patient to increase activity and self care.   - Encourage visually impaired, hearing impaired and aphasic patients to use assistive/communication devices  Outcome: Progressing     Problem: PAIN - ADULT  Goal: Verbalizes/displays adequate comfort level or baseline comfort level  Description: Interventions:  - Encourage patient to monitor pain and request assistance  - Assess pain using appropriate pain scale  - Administer analgesics based on type and severity of pain and evaluate response  - Implement non-pharmacological measures as appropriate and evaluate response  - Consider cultural and social influences on pain and pain management  - Notify physician/advanced practitioner if interventions unsuccessful or patient reports new pain  Outcome: Progressing     Problem: INFECTION - ADULT  Goal: Absence or prevention of progression during hospitalization  Description: INTERVENTIONS:  - Assess and monitor for signs and symptoms of infection  - Monitor lab/diagnostic results  - Administer medications as ordered  - Instruct and encourage patient and family to use good hand hygiene technique  - Identify and instruct in appropriate isolation precautions for identified infection/condition  Outcome: Progressing  Goal: Absence of fever/infection during neutropenic period  Description: INTERVENTIONS:  - Monitor WBC    Outcome: Progressing     Problem: SAFETY ADULT  Goal: Patient will remain free of falls  Description: INTERVENTIONS:  - Educate patient/family on patient safety including physical limitations  - Instruct patient to call for assistance with activity   - Consult OT/PT to assist with strengthening/mobility   - Keep Call bell within reach  - Keep bed low and locked with side rails adjusted as appropriate  - Keep care items and personal  belongings within reach  - Initiate and maintain comfort rounds  - Make Fall Risk Sign visible to staff  - Offer Toileting every 2 Hours, in advance of need  - Initiate/Maintain bed and chair alarm  - Apply yellow socks and bracelet for high fall risk patients  - Consider moving patient to room near nurses station  Outcome: Progressing  Goal: Maintain or return to baseline ADL function  Description: INTERVENTIONS:  -  Assess patient's ability to carry out ADLs; assess patient's baseline for ADL function and identify physical deficits which impact ability to perform ADLs (bathing, care of mouth/teeth, toileting, grooming, dressing, etc.)  - Assess/evaluate cause of self-care deficits   - Assess range of motion  - Assess patient's mobility; develop plan if impaired  - Assess patient's need for assistive devices and provide as appropriate  - Encourage maximum independence but intervene and supervise when necessary  - Involve family in performance of ADLs  - Assess for home care needs following discharge   - Consider OT consult to assist with ADL evaluation and planning for discharge  - Provide patient education as appropriate  Outcome: Progressing  Goal: Maintains/Returns to pre admission functional level  Description: INTERVENTIONS:  - Perform AM-PAC 6 Click Basic Mobility/ Daily Activity assessment daily.  - Set and communicate daily mobility goal to care team and patient/family/caregiver.   - Collaborate with rehabilitation services on mobility goals if consulted  - Perform Range of Motion 3 times a day.  - Reposition patient every 2 hours.  - Dangle patient 3 times a day  - Stand patient 3 times a day  - Ambulate patient 3 times a day  - Out of bed to chair 3 times a day   - Out of bed for meals 3 times a day  - Out of bed for toileting  - Record patient progress and toleration of activity level   Outcome: Progressing     Problem: DISCHARGE PLANNING  Goal: Discharge to home or other facility with appropriate  resources  Description: INTERVENTIONS:  - Identify barriers to discharge w/patient and caregiver  - Arrange for needed discharge resources and transportation as appropriate  - Identify discharge learning needs (meds, wound care, etc.)  - Arrange for interpretive services to assist at discharge as needed  - Refer to Case Management Department for coordinating discharge planning if the patient needs post-hospital services based on physician/advanced practitioner order or complex needs related to functional status, cognitive ability, or social support system  Outcome: Progressing     Problem: Knowledge Deficit  Goal: Patient/family/caregiver demonstrates understanding of disease process, treatment plan, medications, and discharge instructions  Description: Complete learning assessment and assess knowledge base.  Interventions:  - Provide teaching at level of understanding  - Provide teaching via preferred learning methods  Outcome: Progressing     Problem: Alteration in Orientation  Goal: Treatment Goal: Demonstrate a reduction of confusion and improved orientation to person, place, time and/or situation upon discharge, according to optimum baseline/ability  Outcome: Progressing  Goal: Express concerns related to confused thinking related to:  Description: Interventions:  - Encourage patient to express feelings, fears, frustrations, hopes  - Assign consistent caregivers   - [place]/re-orient patient as needed  - Allow comfort items, as appropriate  - Provide visual cues, signs, etc.   Outcome: Progressing  Goal: Allow medical examinations, as recommended  Description: Interventions:  - Provide physical/neurological exams and/or referrals, per provider   Outcome: Progressing  Goal: Cooperate with recommended testing/procedures  Description: Interventions:  - Determine need for ancillary testing  - Observe for mental status changes  - Implement falls/precaution protocol   Outcome: Progressing  Goal: Attend and participate  in unit activities, including therapeutic, recreational, and educational groups  Description: Interventions:  - Provide therapeutic and educational activities daily, encourage attendance and participation, and document same in the medical record   - Provide appropriate opportunities for reminiscence   - Provide a consistent daily routine   - Encourage family contact/visitation   Outcome: Progressing  Goal: Complete daily ADLs, including personal hygiene independently, as able  Description: Interventions:  - Observe, teach, and assist patient with ADLS  Outcome: Progressing     Problem: Nutrition/Hydration-ADULT  Goal: Nutrient/Hydration intake appropriate for improving, restoring or maintaining nutritional needs  Description: Monitor and assess patient's nutrition/hydration status for malnutrition. Collaborate with interdisciplinary team and initiate plan and interventions as ordered.  Monitor patient's weight and dietary intake as ordered or per policy. Utilize nutrition screening tool and intervene as necessary. Determine patient's food preferences and provide high-protein, high-caloric foods as appropriate.     INTERVENTIONS:  - Monitor oral intake, urinary output, labs, and treatment plans  - Assess nutrition and hydration status and recommend course of action  - Evaluate amount of meals eaten  - Assist patient with eating if necessary   - Allow adequate time for meals  - Recommend/ encourage appropriate diets, oral nutritional supplements, and vitamin/mineral supplements  - Order, calculate, and assess calorie counts as needed  - Assess need for intravenous fluids  - Provide specific nutrition/hydration education as appropriate  - Include patient/family/caregiver in decisions related to nutrition  Outcome: Progressing

## 2024-01-23 VITALS
WEIGHT: 202.82 LBS | SYSTOLIC BLOOD PRESSURE: 131 MMHG | BODY MASS INDEX: 31.83 KG/M2 | RESPIRATION RATE: 20 BRPM | HEART RATE: 63 BPM | TEMPERATURE: 98.9 F | DIASTOLIC BLOOD PRESSURE: 77 MMHG | OXYGEN SATURATION: 93 % | HEIGHT: 67 IN

## 2024-01-23 PROBLEM — E78.5 HYPERLIPIDEMIA: Status: RESOLVED | Noted: 2024-01-01 | Resolved: 2024-01-01

## 2024-01-23 PROBLEM — E83.42 HYPOMAGNESEMIA: Status: RESOLVED | Noted: 2024-01-01 | Resolved: 2024-01-01

## 2024-01-23 PROBLEM — R26.2 AMBULATORY DYSFUNCTION: Status: RESOLVED | Noted: 2023-01-01 | Resolved: 2024-01-01

## 2024-01-23 PROCEDURE — 99239 HOSP IP/OBS DSCHRG MGMT >30: CPT | Performed by: STUDENT IN AN ORGANIZED HEALTH CARE EDUCATION/TRAINING PROGRAM

## 2024-01-23 RX ORDER — ACETAMINOPHEN 650 MG/1
650 SUPPOSITORY RECTAL EVERY 4 HOURS PRN
Start: 2024-01-23 | End: 2024-01-30

## 2024-01-23 RX ORDER — LORAZEPAM 2 MG/ML
1 CONCENTRATE ORAL EVERY 8 HOURS PRN
Qty: 15 ML | Refills: 0 | Status: SHIPPED | OUTPATIENT
Start: 2024-01-23 | End: 2024-01-30

## 2024-01-23 NOTE — PROGRESS NOTES
Replaced by [facility]  Progress Note  Name: Tracy Rock I  MRN: [id number]  Unit/Bed#: -01 I Date of Admission: 1/13/2024   Date of Service: 1/22/2024 I Hospital Day: 9    Assessment/Plan   Goals of care, counseling/discussion  Assessment & Plan  Goals of care discussion was held with the son Mr. Ferrell at bedside, and would want to keep her comfortable and want to make her DNR/DNI status.  Patient however has not improved in the last few days and has been more lethargic for more than 72 hours.  So far all the workup has been negative including CT head and CT chest and abdomen.  Patient has advanced dementia and has declined in the past few months as per the son.  He would like her to go back to  assisted living however would want to talk with the hospice team and keep her comfortable at the facility.  1/20/24-plan was discussed again with the patient's son Mr. Ferrell and agrees to keep her comfortable and on hospice and [facility] will be able to take Mrs. Figueroa back on Monday on hospice service at the facility.  Will give comfort medication with Ativan as needed and pleasure feed  Will discontinue all p.o. medication  Continue Ativan as needed, patient has allergies to codeine and morphine however has not needed pain medications.  Patient tested positive for COVID-19 antigen as a screening test on 1/22/2024 and [facility] will be able to accommodate in a private room and plan for discharge on 1/23.  Transport time is around 11 AM.  Family made aware    Essential hypertension  Assessment & Plan  Will discontinue p.o. antihypertensive.    Atrial fibrillation (HCC)  Assessment & Plan  Patient unable to take p.o. medication and will discontinue Eliquis,               VTE Pharmacologic Prophylaxis:   High Risk (Score >/= 5) - Pharmacological DVT Prophylaxis Contraindicated. Sequential Compression Devices Ordered.    Mobility:   Basic Mobility Inpatient Raw Score: 6  JH-HLM  Goal: 2: Bed activities/Dependent transfer  -HLM Achieved: 1: Laying in bed  HLM Goal NOT achieved. Continue with multidisciplinary rounding and encourage appropriate mobility to improve upon HLM goals.    Patient Centered Rounds: I performed bedside rounds with nursing staff today.   Discussions with Specialists or Other Care Team Provider: d/w staff,     Education and Discussions with Family / Patient: Updated  (son) at bedside.    Total Time Spent on Date of Encounter in care of patient: 45 mins. This time was spent on one or more of the following: performing physical exam; counseling and coordination of care; obtaining or reviewing history; documenting in the medical record; reviewing/ordering tests, medications or procedures; communicating with other healthcare professionals and discussing with patient's family/caregivers.    Current Length of Stay: 9 day(s)  Current Patient Status: Inpatient   Certification Statement: The patient will continue to require additional inpatient hospital stay due to encephalopathy , influenza A , covid ag positive   Discharge Plan: Anticipate discharge in 24-48 hrs to rehab facility.    Code Status: Level 4 - Comfort Care    Subjective:   Pt is unresponsive , pt has fever ,covid ag is positive ,    Objective:     Vitals:   Temp (24hrs), Av.7 °F (38.2 °C), Min:100.7 °F (38.2 °C), Max:100.7 °F (38.2 °C)    Temp:  [100.7 °F (38.2 °C)] 100.7 °F (38.2 °C)  HR:  [100] 100  Resp:  [20-24] 24  BP: (105-145)/(70-76) 105/76  SpO2:  [93 %] 93 %  Body mass index is 31.77 kg/m².     Input and Output Summary (last 24 hours):     Intake/Output Summary (Last 24 hours) at 2024  Last data filed at 2024 1100  Gross per 24 hour   Intake --   Output 750 ml   Net -750 ml       Physical Exam:   Physical Exam   HEENT-PERRLA, dry oral mucosa  Neck-supple, no JVD elevation   Respiratory-decreased air entry b/l   Cardiovascular system-S1, S2 heard, no murmur or gallops or  rubs  Abdomen-soft, nontender, no guarding or rigidity, bowel sounds heard  Extremities-no pedal edema  Peripheral pulses palpable  Musculoskeletal-no contractures  Central nervous system-no acute focal neurological deficit ,no sensory or motor deficit noted.  Skin-no rash noted       Additional Data:     Labs:  Results from last 7 days   Lab Units 01/20/24  0536   WBC Thousand/uL 9.69   HEMOGLOBIN g/dL 15.9*   HEMATOCRIT % 48.9*   PLATELETS Thousands/uL 233   NEUTROS PCT % 67   LYMPHS PCT % 20   MONOS PCT % 13*   EOS PCT % 0     Results from last 7 days   Lab Units 01/20/24  0536   SODIUM mmol/L 138   POTASSIUM mmol/L 3.8   CHLORIDE mmol/L 105   CO2 mmol/L 26   BUN mg/dL 11   CREATININE mg/dL 0.54*   ANION GAP mmol/L 7   CALCIUM mg/dL 9.7   ALBUMIN g/dL 2.8*   TOTAL BILIRUBIN mg/dL 1.14*   ALK PHOS U/L 66   ALT U/L 8   AST U/L 28   GLUCOSE RANDOM mg/dL 149*         Results from last 7 days   Lab Units 01/20/24  1059 01/20/24  0714 01/19/24  2033 01/19/24  1512 01/19/24  1113 01/19/24  0720 01/18/24  2053 01/18/24  1610 01/18/24  1102 01/18/24  0712 01/17/24  2042 01/17/24  1632   POC GLUCOSE mg/dl 152* 148* 98 108 110 101 138 163* 133 101 108 145*     Results from last 7 days   Lab Units 01/17/24  0501   HEMOGLOBIN A1C % 7.6*           Lines/Drains:  Invasive Devices       Peripheral Intravenous Line  Duration             Peripheral IV 01/19/24 Dorsal (posterior);Left Forearm 2 days              Drain  Duration             Urethral Catheter 2 days                  Urinary Catheter:  Goal for removal: N/A- Discharging with Benz               Imaging: Personally reviewed the following imaging: chest CT scan and abdominal/pelvic CT    Recent Cultures (last 7 days):         Last 24 Hours Medication List:   Current Facility-Administered Medications   Medication Dose Route Frequency Provider Last Rate    LORazepam  0.5 mg Intravenous Q6H PRN Devorah Salas MD      trimethobenzamide  200 mg Intramuscular Q6H  PHILLIP Carrasco MD          Today, Patient Was Seen By: Devorah Salas MD    **Please Note: This note may have been constructed using a voice recognition system.**

## 2024-01-23 NOTE — PLAN OF CARE
Problem: Nutrition/Hydration-ADULT  Goal: Nutrient/Hydration intake appropriate for improving, restoring or maintaining nutritional needs  Description: Monitor and assess patient's nutrition/hydration status for malnutrition. Collaborate with interdisciplinary team and initiate plan and interventions as ordered.  Monitor patient's weight and dietary intake as ordered or per policy. Utilize nutrition screening tool and intervene as necessary. Determine patient's food preferences and provide high-protein, high-caloric foods as appropriate.     INTERVENTIONS:  - Monitor oral intake, urinary output, labs, and treatment plans  - Assess nutrition and hydration status and recommend course of action  - Evaluate amount of meals eaten  - Assist patient with eating if necessary   - Allow adequate time for meals  - Recommend/ encourage appropriate diets, oral nutritional supplements, and vitamin/mineral supplements  - Order, calculate, and assess calorie counts as needed  - Recommend, monitor, and adjust tube feedings and TPN/PPN based on assessed needs  - Assess need for intravenous fluids  - Provide specific nutrition/hydration education as appropriate  - Include patient/family/caregiver in decisions related to nutrition  Outcome: Progressing      Problem: PAIN - ADULT  Goal: Verbalizes/displays adequate comfort level or baseline comfort level  Description: Interventions:  - Encourage patient to monitor pain and request assistance  - Assess pain using appropriate pain scale  - Administer analgesics based on type and severity of pain and evaluate response  - Implement non-pharmacological measures as appropriate and evaluate response  - Consider cultural and social influences on pain and pain management  - Notify physician/advanced practitioner if interventions unsuccessful or patient reports new pain  Outcome: Progressing     Problem: GENITOURINARY - ADULT  Goal: Absence of urinary retention  Description: INTERVENTIONS:  -  Assess patient’s ability to void and empty bladder  - Monitor I/O  - Bladder scan as needed  - Discuss with physician/AP medications to alleviate retention as needed  - Discuss catheterization for long term situations as appropriate  Outcome: Progressing

## 2024-01-23 NOTE — PLAN OF CARE
Problem: Potential for Falls  Goal: Patient will remain free of falls  Description: INTERVENTIONS:  - Educate patient/family on patient safety including physical limitations  - Instruct patient to call for assistance with activity   - Consult OT/PT to assist with strengthening/mobility   - Keep Call bell within reach  - Keep bed low and locked with side rails adjusted as appropriate  - Keep care items and personal belongings within reach  - Initiate and maintain comfort rounds  - Make Fall Risk Sign visible to staff  - Offer Toileting every 2 Hours, in advance of need  - Initiate/Maintain bed and chair alarm  - Apply yellow socks and bracelet for high fall risk patients  - Consider moving patient to room near nurses station  Outcome: Progressing     Problem: Prexisting or High Potential for Compromised Skin Integrity  Goal: Skin integrity is maintained or improved  Description: INTERVENTIONS:  - Identify patients at risk for skin breakdown  - Assess and monitor skin integrity  - Assess and monitor nutrition and hydration status  - Monitor labs   - Assess for incontinence   - Turn and reposition patient  - Assist with mobility/ambulation  - Relieve pressure over bony prominences  - Avoid friction and shearing  - Provide appropriate hygiene as needed including keeping skin clean and dry  - Evaluate need for skin moisturizer/barrier cream  - Collaborate with interdisciplinary team   - Patient/family teaching   Outcome: Progressing     Problem: NEUROSENSORY - ADULT  Goal: Achieves stable or improved neurological status  Description: INTERVENTIONS  - Monitor and report changes in neurological status  - Monitor vital signs such as temperature, blood pressure, glucose, and any other labs ordered   - Initiate measures to prevent increased intracranial pressure  - Monitor for seizure activity and implement precautions if appropriate      Outcome: Progressing  Goal: Achieves maximal functionality and self care  Description:  INTERVENTIONS  - Monitor swallowing and airway patency with patient fatigue and changes in neurological status  - Encourage and assist patient to increase activity and self care.   - Encourage visually impaired, hearing impaired and aphasic patients to use assistive/communication devices  Outcome: Progressing     Problem: GENITOURINARY - ADULT  Goal: Maintains or returns to baseline urinary function  Description: INTERVENTIONS:  - Assess urinary function  - Encourage oral fluids to ensure adequate hydration if ordered  - Administer IV fluids as ordered to ensure adequate hydration  - Administer ordered medications as needed  - Offer frequent toileting  - Follow urinary retention protocol if ordered  Outcome: Progressing  Goal: Absence of urinary retention  Description: INTERVENTIONS:  - Assess patient’s ability to void and empty bladder  - Monitor I/O  - Bladder scan as needed  - Discuss with physician/AP medications to alleviate retention as needed  - Discuss catheterization for long term situations as appropriate  Outcome: Progressing     Problem: PAIN - ADULT  Goal: Verbalizes/displays adequate comfort level or baseline comfort level  Description: Interventions:  - Encourage patient to monitor pain and request assistance  - Assess pain using appropriate pain scale  - Administer analgesics based on type and severity of pain and evaluate response  - Implement non-pharmacological measures as appropriate and evaluate response  - Consider cultural and social influences on pain and pain management  - Notify physician/advanced practitioner if interventions unsuccessful or patient reports new pain  Outcome: Progressing     Problem: INFECTION - ADULT  Goal: Absence or prevention of progression during hospitalization  Description: INTERVENTIONS:  - Assess and monitor for signs and symptoms of infection  - Monitor lab/diagnostic results  - Administer medications as ordered  - Instruct and encourage patient and family to use  good hand hygiene technique  - Identify and instruct in appropriate isolation precautions for identified infection/condition  Outcome: Progressing  Goal: Absence of fever/infection during neutropenic period  Description: INTERVENTIONS:  - Monitor WBC    Outcome: Progressing     Problem: SAFETY ADULT  Goal: Patient will remain free of falls  Description: INTERVENTIONS:  - Educate patient/family on patient safety including physical limitations  - Instruct patient to call for assistance with activity   - Consult OT/PT to assist with strengthening/mobility   - Keep Call bell within reach  - Keep bed low and locked with side rails adjusted as appropriate  - Keep care items and personal belongings within reach  - Initiate and maintain comfort rounds  - Make Fall Risk Sign visible to staff  - Offer Toileting every 2 Hours, in advance of need  - Initiate/Maintain bed and chair alarm  - Apply yellow socks and bracelet for high fall risk patients  - Consider moving patient to room near nurses station  Outcome: Progressing  Goal: Maintain or return to baseline ADL function  Description: INTERVENTIONS:  -  Assess patient's ability to carry out ADLs; assess patient's baseline for ADL function and identify physical deficits which impact ability to perform ADLs (bathing, care of mouth/teeth, toileting, grooming, dressing, etc.)  - Assess/evaluate cause of self-care deficits   - Assess range of motion  - Assess patient's mobility; develop plan if impaired  - Assess patient's need for assistive devices and provide as appropriate  - Encourage maximum independence but intervene and supervise when necessary  - Involve family in performance of ADLs  - Assess for home care needs following discharge   - Consider OT consult to assist with ADL evaluation and planning for discharge  - Provide patient education as appropriate  Outcome: Progressing  Goal: Maintains/Returns to pre admission functional level  Description: INTERVENTIONS:  -  Perform AM-PAC 6 Click Basic Mobility/ Daily Activity assessment daily.  - Set and communicate daily mobility goal to care team and patient/family/caregiver.   - Collaborate with rehabilitation services on mobility goals if consulted  - Perform Range of Motion 3 times a day.  - Dangle patient 3 times a day  - Stand patient 3 times a day  - Out of bed to chair 3 times a day   - Out of bed for meals 3 times a day  - Out of bed for toileting  - Record patient progress and toleration of activity level   Outcome: Progressing     Problem: DISCHARGE PLANNING  Goal: Discharge to home or other facility with appropriate resources  Description: INTERVENTIONS:  - Identify barriers to discharge w/patient and caregiver  - Arrange for needed discharge resources and transportation as appropriate  - Identify discharge learning needs (meds, wound care, etc.)  - Arrange for interpretive services to assist at discharge as needed  - Refer to Case Management Department for coordinating discharge planning if the patient needs post-hospital services based on physician/advanced practitioner order or complex needs related to functional status, cognitive ability, or social support system  Outcome: Progressing     Problem: Knowledge Deficit  Goal: Patient/family/caregiver demonstrates understanding of disease process, treatment plan, medications, and discharge instructions  Description: Complete learning assessment and assess knowledge base.  Interventions:  - Provide teaching at level of understanding  - Provide teaching via preferred learning methods  Outcome: Progressing     Problem: Alteration in Orientation  Goal: Treatment Goal: Demonstrate a reduction of confusion and improved orientation to person, place, time and/or situation upon discharge, according to optimum baseline/ability  Outcome: Progressing  Goal: Express concerns related to confused thinking related to:  Description: Interventions:  - Encourage patient to express feelings,  fears, frustrations, hopes  - Assign consistent caregivers   - [place]/re-orient patient as needed  - Allow comfort items, as appropriate  - Provide visual cues, signs, etc.   Outcome: Progressing  Goal: Allow medical examinations, as recommended  Description: Interventions:  - Provide physical/neurological exams and/or referrals, per provider   Outcome: Progressing  Goal: Cooperate with recommended testing/procedures  Description: Interventions:  - Determine need for ancillary testing  - Observe for mental status changes  - Implement falls/precaution protocol   Outcome: Progressing  Goal: Attend and participate in unit activities, including therapeutic, recreational, and educational groups  Description: Interventions:  - Provide therapeutic and educational activities daily, encourage attendance and participation, and document same in the medical record   - Provide appropriate opportunities for reminiscence   - Provide a consistent daily routine   - Encourage family contact/visitation   Outcome: Progressing  Goal: Complete daily ADLs, including personal hygiene independently, as able  Description: Interventions:  - Observe, teach, and assist patient with ADLS  Outcome: Progressing     Problem: Nutrition/Hydration-ADULT  Goal: Nutrient/Hydration intake appropriate for improving, restoring or maintaining nutritional needs  Description: Monitor and assess patient's nutrition/hydration status for malnutrition. Collaborate with interdisciplinary team and initiate plan and interventions as ordered.  Monitor patient's weight and dietary intake as ordered or per policy. Utilize nutrition screening tool and intervene as necessary. Determine patient's food preferences and provide high-protein, high-caloric foods as appropriate.     INTERVENTIONS:  - Monitor oral intake, urinary output, labs, and treatment plans  - Assess nutrition and hydration status and recommend course of action  - Evaluate amount of meals eaten  - Assist  patient with eating if necessary   - Allow adequate time for meals  - Recommend/ encourage appropriate diets, oral nutritional supplements, and vitamin/mineral supplements  - Order, calculate, and assess calorie counts as needed  - Assess need for intravenous fluids  - Provide specific nutrition/hydration education as appropriate  - Include patient/family/caregiver in decisions related to nutrition  Outcome: Progressing

## 2024-01-23 NOTE — ASSESSMENT & PLAN NOTE
Goals of care discussion was held with the son Mr. Ferrell at bedside, and would want to keep her comfortable and want to make her DNR/DNI status.  Patient however has not improved in the last few days and has been more lethargic for more than 72 hours.  So far all the workup has been negative including CT head and CT chest and abdomen.  Patient has advanced dementia and has declined in the past few months as per the son.  He would like her to go back to  assisted living however would want to talk with the hospice team and keep her comfortable at the facility.  1/20/24-plan was discussed again with the patient's son Mr. Ferrell and agrees to keep her comfortable and on hospice and [facility] will be able to take Mrs. Figueroa back on Monday on hospice service at the facility.  Will give comfort medication with Ativan as needed and pleasure feed  Will discontinue all p.o. medication  Continue Ativan as needed, patient has allergies to codeine and morphine however has not needed pain medications.  Patient tested positive for COVID-19 antigen as a screening test on 1/22/2024 and consuelo Dempsey will be able to accommodate in a private room and plan for discharge on 1/23.  Transport time is around 11 AM.  Family made aware

## 2024-01-23 NOTE — CASE MANAGEMENT
Case Management Discharge Planning Note    Patient name Tracy Rock  Location /-01 MRN [id number]  : 1940 Date 2024       Current Admission Date: 2024  Current Admission Diagnosis:Sepsis ([facility])   Patient Active Problem List    Diagnosis Date Noted    Moderate protein-calorie malnutrition ([facility]) 2024    Goals of care, counseling/discussion 2024    Encephalopathy 2024    Polycythemia 2024    Elevated hemoglobin ([facility]) 2024    Sepsis ([facility]) 2024    Influenza A 2024    Cellulitis of right lower extremity 2024    Hypomagnesemia 2024    Hyperlipidemia 2024    History of breast cancer 2024    Chronic systolic heart failure ([facility]) 2024    Generalized weakness 2024    Dysuria 2023    Ambulatory dysfunction 2023    Cellulitis of left lower extremity 2023    Venous stasis ulcer of left calf ([facility]) 2023    Right hand pain 2022    Dilation of pulmonary artery ([facility]) 2022    Renal calculus 2022    Acute pain of left knee 2022    Acute pain due to trauma 09/15/2022    History of hypertension 09/15/2022    Paroxysmal atrial fibrillation ([facility]) 09/15/2022    Closed compression fracture of L2 lumbar vertebra, initial encounter ([facility]) 2022    Fall from ground level 2022    Fall 2022    Low back pain 2022    Dementia ([facility]) 2022    Recurrent falls 2022    PONV (postoperative nausea and vomiting)     Obesity, morbid ([facility]) 2021    Primary osteoarthritis of left knee 2019    Deep vein thrombosis (DVT) of lower extremity ([facility]) 2017    PE (pulmonary thromboembolism) ([facility]) 2017    DVT (deep venous thrombosis) ([facility]) 2017    Type 2 diabetes mellitus ([facility]) 2017    Incomplete RBBB 2017    Pulmonary emboli ([facility]) 2017    Chronic systolic congestive heart failure ([facility]) 2016    Atrial fibrillation ([facility])  12/02/2016    Essential hypertension 12/02/2016    NICM (nonischemic cardiomyopathy) ([facility]) 12/02/2016    Nonischemic cardiomyopathy ([facility]) 06/23/2016    Atrial fibrillation ([facility]CC) 02/25/2016    GERD (gastroesophageal reflux disease) 09/05/2012    DDD (degenerative disc disease), lumbosacral 11/19/2007    Thoracic or lumbosacral neuritis or radiculitis 11/19/2007      LOS (days): 10  Geometric Mean LOS (GMLOS) (days): 5.1  Days to GMLOS:-4.6     OBJECTIVE:  Risk of Unplanned Readmission Score: 16.68         Current admission status: Inpatient   Preferred Pharmacy:   [id number] - [place], [place] - [address] [address]  Phone: [phone] Fax: [phone]    [facility] [address] [place] [address]  [address] [place] [address]  [address] [place], [place] [id number]  Phone: [phone] Fax: [phone]    Primary Care Provider: Bertha Isaac DO    Primary Insurance: MEDICARE  Secondary Insurance: [facility]    DISCHARGE DETAILS:    Discharge planning discussed with:: Patient's son  Freedom of Choice: Yes  Comments - Freedom of Choice: Choice is to return to [facility] on the skilled unit.  CM contacted family/caregiver?: Yes  Were Treatment Team discharge recommendations reviewed with patient/caregiver?: Yes  Did patient/caregiver verbalize understanding of patient care needs?: N/A- going to facility  Were patient/caregiver advised of the risks associated with not following Treatment Team discharge recommendations?: Yes    Contacts  Patient Contacts: Ghassan Rock  Relationship to Patient:: Family  Contact Method: Phone  Phone Number: [phone]  Reason/Outcome: Emergency Contact, Discharge Planning    Requested Home Health Care         Is the patient interested in HHC at discharge?: No    DME Referral Provided  Referral made for DME?: No    Other Referral/Resources/Interventions Provided:  Referral Comments: Jun  confirmed able to accept with 11 AM transport today.    Would you like to participate in our [facility] service program?  : No - Declined    Treatment Team Recommendation: SNF  Discharge Destination Plan:: SNF ([facility] ([facility]  Transport at Discharge : [facility]  Dispatcher Contacted: Yes  Number/Name of Dispatcher: ROUNDTRIP  Transported by (Company and Unit #): [facility]  ETA of Transport (Date): 01/23/24  ETA of Transport (Time): 1100     IMM Given (Date):: 01/23/24  IMM Given to:: Family  IMM reviewed with  patient's son ,  patient's son  agrees with discharge determination.     Additional Comments: OOH DNR/DNI and CMN in chart for transport.    Accepting Facility Name, City & State : [facility]  [facility]  Receiving Facility/Agency Phone Number: [phone]  Facility/Agency Fax Number: [phone]  Facility Insurance Auth Number: MEDICARE
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
## 2024-01-24 NOTE — ASSESSMENT & PLAN NOTE
Malnutrition Findings:   Adult Malnutrition type: Chronic illness  Adult Degree of Malnutrition: Malnutrition of moderate degree  Malnutrition Characteristics: Fat loss, Muscle loss, Weight loss                  360 Statement: Moderate chronic malnutrition r/t inadequate energy intake overtime as evidenced by wt decreae of 38#/15.8% x 3mo, moderate muscle wasting and fat loss (clavicles, temples, orbitals).  Will treat with nutrition therapy, PO diet, supplements.    BMI Findings:           Body mass index is 31.77 kg/m².

## 2024-01-24 NOTE — DISCHARGE SUMMARY
[facility]  Discharge- Tracy Rock 1940, 83 y.o. female MRN: [id number]  Unit/Bed#: -01 Encounter: [id number]  Primary Care Provider: Bertha Isaac DO   Date and time admitted to hospital: 1/13/2024  1:18 PM    Moderate protein-calorie malnutrition (HCC)  Assessment & Plan  Malnutrition Findings:   Adult Malnutrition type: Chronic illness  Adult Degree of Malnutrition: Malnutrition of moderate degree  Malnutrition Characteristics: Fat loss, Muscle loss, Weight loss                  360 Statement: Moderate chronic malnutrition r/t inadequate energy intake overtime as evidenced by wt decreae of 38#/15.8% x 3mo, moderate muscle wasting and fat loss (clavicles, temples, orbitals).  Will treat with nutrition therapy, PO diet, supplements.    BMI Findings:           Body mass index is 31.77 kg/m².       Goals of care, counseling/discussion  Assessment & Plan  Goals of care discussion was held with the son Mr. eFrrell at bedside, and would want to keep her comfortable and want to make her DNR/DNI status.  Patient however has not improved in the last few days and has been more lethargic for more than 72 hours.  So far all the workup has been negative including CT head and CT chest and abdomen.  Patient has advanced dementia and has declined in the past few months as per the son.  He would like her to go back to  assisted living however would want to talk with the hospice team and keep her comfortable at the facility.  1/20/24-plan was discussed again with the patient's son Mr. Ferrell and agrees to keep her comfortable and on hospice and [facility] will be able to take Mrs. Figueroa back on Monday on hospice service at the facility.  Will give comfort medication with Ativan as needed and pleasure feed  Will discontinue all p.o. medication  Continue Ativan as needed, patient has allergies to codeine and morphine however has not needed pain medications.  Patient tested  positive for COVID-19 antigen as a screening test on 1/22/2024 and consuelo Dempsey will be able to accommodate in a private room and plan for discharge on 1/23.  Transport time is around 11 AM.  Family made aware    Polycythemia  Assessment & Plan  Elevated hb likely with poor po intake and dehydration will give IV fluids , also has CHF , hold lasix and avoid fluid overload       Encephalopathy  Assessment & Plan  Patient has severe dementia with behavioral disturbance at baseline,  Geriatric input highly appreciated,  Delirium with hospitalization and acute infection with influenza A and cellulitis of the lower extremity which has improved,Will discontinue IV antibiotic with Ancef  However patient has decreased p.o. intake and confusion persist with needing restraints  Will continue with Tylenol scheduled dose  BuSpar has been discontinued and started on gabapentin  Maintain sleep-wake cycle  Avoid constipation and retention of urine  Will continue Lexapro and Zyprexa and nightly dose of melatonin   Will get CT brain , stat , r/o intracranial pathology     Chronic systolic heart failure (HCC)  Assessment & Plan  Last EF of 20-25% in October 2021  On diuresis with Lasix and beta-blockade with Toprol-XL  Low-sodium diet with fluid restriction enforced  Monitor/replete serum potassium/magnesium as necessary  Will hold lasix due to dehydration and decreased PO intake     History of breast cancer  Assessment & Plan  Status post left mastectomy  Outpatient follow-up    Cellulitis of right lower extremity  Assessment & Plan  Given a dose of IV Cefepime in the ED -> de-escalated to IV Ancef  Cefazolin discontinued   Preliminary read from right lower extremity venous duplex study negative for DVT   1/17  improved rt leg swelling and redness , will dc abx     Influenza A  Assessment & Plan  Contact/droplet precautions  Completed tamiflu today  Supportive care  Maintain oxygenation    Dementia (HCC)  Assessment &  Plan  Supportive care  Continue Lexapro/Zyprexa and PRN Ativan  Discontinue buspar  Has required intermittent course of restraints ,now will take off restraints and see     Type 2 diabetes mellitus (HCC)  Assessment & Plan  Lab Results   Component Value Date    HGBA1C 7.6 (H) 01/17/2024     Diet controlled at home  Continue SSI coverage per [employer] while hospitalized  Carbohydrate restriction  Hypoglycemia protocol  Diet is downgraded - pureed and nectar thick liquid   Will make npo status as she is still lethargic     Atrial fibrillation (HCC)  Assessment & Plan  Patient unable to take p.o. medication and will discontinue Eliquis,    * Sepsis (HCC)  Assessment & Plan  Presents with tachycardia/tachypnea and nursing home reports of recurrent fevers over the last few days  Etiology suspected to be secondary to a small developing right lower extremity lightheadedness coupled with influenza A (plan for individual assessments)  Monitor vitals and maintain hemodynamics - keep MAP > 65 -> currently at goal, and in the setting of chronic systolic CHF, IV fluid resuscitation not indicated  Blood cultures from 1/13 remain negative thus far - urinalysis negative for infection - CXR, on personal review, without focal infiltrates but mild bilateral reticular-type opacities vs vascular congestion (known CHF history)  Lactic acid normal - procalcitonin negative x 2     Hyperlipidemia-resolved as of 1/23/2024  Assessment & Plan  Continue statin    Hypomagnesemia-resolved as of 1/23/2024  Assessment & Plan  Monitor/replete serum magnesium and potassium    Ambulatory dysfunction-resolved as of 1/23/2024  Assessment & Plan  Cont PT/OT when more awake     Atrial fibrillation (HCC)-resolved as of 1/23/2024  Assessment & Plan  Cont eliquis and toprol XL     GERD (gastroesophageal reflux disease)-resolved as of 1/23/2024  Assessment & Plan  Continue Pepcid    Essential hypertension-resolved as of 1/23/2024  Assessment & Plan  Will  discontinue p.o. antihypertensive.      Medical Problems       Resolved Problems  Date Reviewed: 1/23/2024            Resolved    Essential hypertension 1/23/2024     Resolved by  Torsten Friedman    GERD (gastroesophageal reflux disease) 1/23/2024     Resolved by  Torsten Friedman    Overview Signed 2/13/2019  4:08 PM by Dale Miller MD     Procedure/Onset: 07/08/2010         Atrial fibrillation (HCC) 1/23/2024     Resolved by  Torsten Friedman    Overview Signed 2/13/2019  4:08 PM by Dale Miller MD     Last Assessment & Plan:   Rate controlled and on anticoagulation. Schedule cardioversion since she has been on anticoagulation for the past 3 weeks. She confirms that she has taken her medications without missing a dose. She will have an echocardiogram in 6 wks  After cardioversion to reevaluate LV fxn.         Ambulatory dysfunction 1/23/2024     Resolved by  Torsten Friedman    Hypomagnesemia 1/23/2024     Resolved by  Torsten Friedman    Hyperlipidemia 1/23/2024     Resolved by  Torsten Friedman        Discharging Physician / Practitioner: Torsten Friedman  PCP: Bertha Isaac DO  Admission Date:   Admission Orders (From admission, onward)       Ordered        01/13/24 1634  INPATIENT ADMISSION  Once                          Discharge Date: 01/23/24    Consultations During Hospital Stay:  Geriatrics    Procedures Performed:   None    Significant Findings / Test Results:   Flu A PCR- Positive  Covid-19 PCR- Positive    Incidental Findings:   N/A     Test Results Pending at Discharge (will require follow up):   None     Outpatient Tests Requested:  F/u with PCP as needed    Complications:  Patient discharged on comfort measures with plan for hospice    Reason for Admission: AMS and fever    Hospital Course:   Tracy Rock is a 83 y.o. female patient who originally presented to the hospital on 1/13/2024 due to fevers and altered mental status.  She has a history of dementia and per her  "son she had worsening mentation and quality of life recently.  She was sent to the ED and was unfortunately found to have findings concerning for sepsis clinically.  She was tested to be flu a positive.  Patient started on broad-spectrum antibiotics and a course of Tamiflu.  CT head and chest without significant findings.  Unfortunately patient's delirium failed to improve and she had worsening mental status and functional status in the hospital.  Despite Tamiflu and antibiotics she continued to languish in the hospital.  Geriatrics was consulted for further recommendations.  Ultimately with discussion of son she was switched to comfort measures with plan to discharge and consider hospice after discharge.  She was tested for COVID for discharge planning and ended up being positive for that as well.  Unclear if that was not acquired in the hospital or prior to admission and just did not turn up at the time of admission    Please see above list of diagnoses and related plan for additional information.     Condition at Discharge:  Comfort Measures    Discharge Day Visit / Exam:   Subjective:  Minimally responsive  Vitals: Blood Pressure: 131/77 (01/23/24 0742)  Pulse: 63 (01/23/24 0742)  Temperature: 98.9 °F (37.2 °C) (01/23/24 0742)  Temp Source: Tympanic (01/23/24 0742)  Respirations: 20 (01/23/24 0742)  Height: 5' 7\" (170.2 cm) (01/13/24 1900)  Weight - Scale: 92 kg (202 lb 13.2 oz) (01/13/24 2010)  SpO2: 93 % (01/23/24 0742)  Exam:   Physical Exam  Vitals reviewed.   Constitutional:       Appearance: She is ill-appearing.      Comments: frail   HENT:      Head: Normocephalic.      Mouth/Throat:      Mouth: Mucous membranes are moist.   Eyes:      General: No scleral icterus.     Extraocular Movements: Extraocular movements intact.   Cardiovascular:      Rate and Rhythm: Normal rate and regular rhythm.      Pulses: Normal pulses.      Heart sounds: No murmur heard.     No friction rub. No gallop.   Pulmonary:      " Effort: Pulmonary effort is normal. No respiratory distress.      Breath sounds: Rales present. No wheezing or rhonchi.   Abdominal:      General: Abdomen is flat. Bowel sounds are normal. There is no distension.      Palpations: Abdomen is soft.      Tenderness: There is no abdominal tenderness. There is no guarding or rebound.   Musculoskeletal:      Right lower leg: No edema.      Left lower leg: No edema.   Skin:     General: Skin is warm.      Capillary Refill: Capillary refill takes less than 2 seconds.      Coloration: Skin is not jaundiced.      Findings: No rash.   Neurological:      General: No focal deficit present.      Mental Status: She is alert. She is disoriented.          Discussion with Family: Updated  (son) via phone.    Discharge instructions/Information to patient and family:   See after visit summary for information provided to patient and family.      Provisions for Follow-Up Care:  See after visit summary for information related to follow-up care and any pertinent home health orders.      Mobility at time of Discharge:   Basic Mobility Inpatient Raw Score: 6  -HLM Goal: 2: Bed activities/Dependent transfer  -HLM Achieved: 1: Laying in bed  HLM Goal NOT achieved. Continue to encourage mobility in post discharge setting.     Disposition:   Assisted Living Facility at [facility]    Planned Readmission: No     Discharge Statement:  I spent 45 minutes discharging the patient. This time was spent on the day of discharge. I had direct contact with the patient on the day of discharge. Greater than 50% of the total time was spent examining patient, answering all patient questions, arranging and discussing plan of care with patient as well as directly providing post-discharge instructions.  Additional time then spent on discharge activities.    Discharge Medications:  See after visit summary for reconciled discharge medications provided to patient and/or family.      **Please  Note: This note may have been constructed using a voice recognition system**

## 2024-01-24 NOTE — ASSESSMENT & PLAN NOTE
Goals of care discussion was held with the son Mr. Ferrell at bedside, and would want to keep her comfortable and want to make her DNR/DNI status.  Patient however has not improved in the last few days and has been more lethargic for more than 72 hours.  So far all the workup has been negative including CT head and CT chest and abdomen.  Patient has advanced dementia and has declined in the past few months as per the son.  He would like her to go back to  assisted living however would want to talk with the hospice team and keep her comfortable at the facility.  1/20/24-plan was discussed again with the patient's son Mr. Ferrell and agrees to keep her comfortable and on hospice and [facility] will be able to take Mrs. Figueroa back on Monday on hospice service at the facility.  Will give comfort medication with Ativan as needed and pleasure feed  Will discontinue all p.o. medication  Continue Ativan as needed, patient has allergies to codeine and morphine however has not needed pain medications.  Patient tested positive for COVID-19 antigen as a screening test on 1/22/2024 and [facility] will be able to accommodate in a private room and plan for discharge on 1/23.  Transport time is around 11 AM.  Family made aware

## 2024-01-26 ENCOUNTER — TELEPHONE (OUTPATIENT)
Age: 84
End: 2024-01-26

## 2024-01-27 NOTE — TELEPHONE ENCOUNTER
[phone]/[facility] /Resident passed away /Patient Tracy MELENDEZJasmin Rock  1940        VIA TT

## 2024-01-29 PROBLEM — U07.1 COVID-19: Status: ACTIVE | Noted: 2024-01-29
